# Patient Record
Sex: MALE | Race: WHITE | NOT HISPANIC OR LATINO | Employment: FULL TIME | URBAN - METROPOLITAN AREA
[De-identification: names, ages, dates, MRNs, and addresses within clinical notes are randomized per-mention and may not be internally consistent; named-entity substitution may affect disease eponyms.]

---

## 2017-02-03 ENCOUNTER — OFFICE VISIT (OUTPATIENT)
Dept: CARDIOLOGY | Facility: CLINIC | Age: 67
End: 2017-02-03
Payer: COMMERCIAL

## 2017-02-03 VITALS
WEIGHT: 233.69 LBS | DIASTOLIC BLOOD PRESSURE: 63 MMHG | HEIGHT: 72 IN | HEART RATE: 64 BPM | SYSTOLIC BLOOD PRESSURE: 131 MMHG | BODY MASS INDEX: 31.65 KG/M2

## 2017-02-03 DIAGNOSIS — I10 ESSENTIAL HYPERTENSION: ICD-10-CM

## 2017-02-03 DIAGNOSIS — E78.5 HYPERLIPIDEMIA, UNSPECIFIED HYPERLIPIDEMIA TYPE: ICD-10-CM

## 2017-02-03 DIAGNOSIS — E78.00 HYPERCHOLESTEREMIA: Primary | ICD-10-CM

## 2017-02-03 PROCEDURE — 99999 PR PBB SHADOW E&M-EST. PATIENT-LVL III: CPT | Mod: PBBFAC,,, | Performed by: INTERNAL MEDICINE

## 2017-02-03 PROCEDURE — 1157F ADVNC CARE PLAN IN RCRD: CPT | Mod: S$GLB,,, | Performed by: INTERNAL MEDICINE

## 2017-02-03 PROCEDURE — 1125F AMNT PAIN NOTED PAIN PRSNT: CPT | Mod: S$GLB,,, | Performed by: INTERNAL MEDICINE

## 2017-02-03 PROCEDURE — 3075F SYST BP GE 130 - 139MM HG: CPT | Mod: S$GLB,,, | Performed by: INTERNAL MEDICINE

## 2017-02-03 PROCEDURE — 3078F DIAST BP <80 MM HG: CPT | Mod: S$GLB,,, | Performed by: INTERNAL MEDICINE

## 2017-02-03 PROCEDURE — 99214 OFFICE O/P EST MOD 30 MIN: CPT | Mod: S$GLB,,, | Performed by: INTERNAL MEDICINE

## 2017-02-03 PROCEDURE — 1159F MED LIST DOCD IN RCRD: CPT | Mod: S$GLB,,, | Performed by: INTERNAL MEDICINE

## 2017-02-03 PROCEDURE — 1160F RVW MEDS BY RX/DR IN RCRD: CPT | Mod: S$GLB,,, | Performed by: INTERNAL MEDICINE

## 2017-02-03 RX ORDER — PRAVASTATIN SODIUM 20 MG/1
20 TABLET ORAL DAILY
Qty: 90 TABLET | Refills: 3 | Status: SHIPPED | OUTPATIENT
Start: 2017-02-03 | End: 2018-02-16

## 2017-02-03 RX ORDER — PRAVASTATIN SODIUM 20 MG/1
20 TABLET ORAL DAILY
Qty: 90 TABLET | Refills: 3 | Status: SHIPPED | OUTPATIENT
Start: 2017-02-03 | End: 2017-02-03 | Stop reason: SDUPTHER

## 2017-02-03 NOTE — LETTER
February 5, 2017      Taj De La Vega MD  [address] [facility] [address]           [facility]  [address]  [place] [address]  Phone: [phone]          Patient: Riky Salcedo   MR Number: [id number]   YOB: 1950   Date of Visit: 2/3/2017       Dear Dr. Taj De La Vega:    Thank you for referring Riky Salcedo to me for evaluation. Attached you will find relevant portions of my assessment and plan of care.    If you have questions, please do not hesitate to call me. I look forward to following Riky Salcedo along with you.    Sincerely,    Elodia Duval MD    Enclosure  CC:  No Recipients    If you would like to receive this communication electronically, please contact [email or web address] or [phone] to request more information on [employer] Link access.    For providers and/or their staff who would like to refer a patient to [facility], please contact us through our one-stop-shop provider referral line, [facility], at [phone].    If you feel you have received this communication in error or would no longer like to receive these types of communications, please e-mail [email or web address]

## 2017-02-03 NOTE — PROGRESS NOTES
Subjective:   Patient ID:  Riky Salcedo is a 66 y.o. male who presents for follow-up of Hyperlipidemia (1 yr f/u )    Patient is a . Patient was in an attic working in an odd position that his left arm and shoulder was twisted which led to pain in the left shoulder and upper left chest. Pain getting better does not want to take Ibobrufen but would like to continue tylenol.Stress test normal. [place] risk score 18% discussed the 10 year risk. Patient has been prescribed statin by Dr. Tompkins before that he does not want to take it with concerns of myalgias and seems to have a non-compliant attitude, he also Has a family h/o MI in the parents, he also does not want to take ASA for the concern of bleeding.      Echo 1/16    1 - Normal left ventricular systolic function (EF 60-65%).     2 - Normal left ventricular diastolic function.     3 - Normal right ventricular systolic function .     No evidence of stress induced myocardial ischemia.   HPI:   No complains  No chest pain, Orthopnea, PND of heart failure symptoms.        Patient Active Problem List   Diagnosis    Hypertension    HSV-1 (herpes simplex virus 1) infection    Male hypogonadism    Lipoma of back    Alcohol abuse with alcohol-induced disorder    Infected sebaceous cyst    Hypercholesteremia    Special screening for malignant neoplasms, colon     Visit Vitals    /63 (BP Location: Left arm, Patient Position: Sitting, BP Method: Automatic)    Pulse 64    Ht 6' (1.829 m)    Wt 106 kg (233 lb 11 oz)    BMI 31.69 kg/m2     Body mass index is 31.69 kg/(m^2).  CrCl cannot be calculated (Patient has no serum creatinine result on file.).    Lab Results   Component Value Date     01/12/2016    K 4.5 01/12/2016     01/12/2016    CO2 27 01/12/2016    BUN 16 01/12/2016    CREATININE 1.0 01/12/2016     01/12/2016    HGBA1C 6.0 03/24/2015    AST 23 01/12/2016    ALT 28 01/12/2016    ALBUMIN 3.7 03/16/2015    PROT  7.2 03/16/2015    BILITOT 0.5 03/16/2015    WBC 6.75 03/16/2015    HGB 14.7 03/16/2015    HCT 43.3 03/16/2015    MCV 89 03/16/2015     03/16/2015    INR 0.9 07/02/2009    PSA 0.41 02/28/2013    TSH 2.005 05/21/2012    CHOL 205 (H) 01/12/2016    HDL 42 01/12/2016    LDLCALC 133.6 01/12/2016    TRIG 147 01/12/2016       Current Outpatient Prescriptions   Medication Sig    desonide 0.05% (DESOWEN) 0.05 % Oint AAA on scrotum BID x 1-2 wks then prn flares only    lisinopril (PRINIVIL,ZESTRIL) 20 MG tablet TAKE ONE TABLET BY MOUTH ONCE DAILY    pravastatin (PRAVACHOL) 20 MG tablet Take 1 tablet (20 mg total) by mouth once daily.     No current facility-administered medications for this visit.        Review of Systems   Constitution: Positive for weakness and malaise/fatigue. Negative for chills, decreased appetite, night sweats, weight gain and weight loss.        Patient drinks and plays black elena regularly couple of times a week.   Patient works long hours and does not sleep well   Eyes: Negative for blurred vision, double vision, visual disturbance and visual halos.   Cardiovascular: Negative for chest pain, claudication, cyanosis, dyspnea on exertion, irregular heartbeat, leg swelling, near-syncope, orthopnea, palpitations, paroxysmal nocturnal dyspnea and syncope.   Respiratory: Negative for cough, hemoptysis, snoring, sputum production and wheezing.    Endocrine: Negative for cold intolerance, heat intolerance, polydipsia and polyphagia.   Hematologic/Lymphatic: Negative for adenopathy and bleeding problem. Does not bruise/bleed easily.   Skin: Negative for flushing, itching, poor wound healing and rash.   Musculoskeletal: Positive for arthritis (knee injection received). Negative for back pain, falls, gout, joint pain, joint swelling, muscle cramps, muscle weakness, myalgias, neck pain and stiffness.   Gastrointestinal: Negative for bloating, abdominal pain, anorexia, diarrhea, dysphagia, excessive  appetite, flatus, hematemesis, jaundice, melena and nausea.   Genitourinary: Negative for hesitancy and incomplete emptying.   Neurological: Negative for aphonia, brief paralysis, difficulty with concentration, disturbances in coordination, excessive daytime sleepiness, dizziness, focal weakness, light-headedness and loss of balance.        Insomnia   Psychiatric/Behavioral: Negative for altered mental status, depression, hallucinations, hypervigilance, memory loss, substance abuse and suicidal ideas. The patient does not have insomnia and is not nervous/anxious.        Objective:   Physical Exam   Constitutional: He is oriented to person, place, and time. He appears well-developed and well-nourished. No distress.   Prominent facial redness   HENT:   Head: Normocephalic and atraumatic.   Nose: Nose normal.   Mouth/Throat: No oropharyngeal exudate.   Eyes: Conjunctivae and EOM are normal. Pupils are equal, round, and reactive to light. Right eye exhibits no discharge. Left eye exhibits no discharge. No scleral icterus.   Neck: Normal range of motion. Neck supple. No JVD present. No tracheal deviation present. No thyromegaly present.   Cardiovascular: Normal rate, regular rhythm, normal heart sounds and intact distal pulses.  Exam reveals no gallop and no friction rub.    No murmur heard.  Pulmonary/Chest: Effort normal and breath sounds normal. No stridor. No respiratory distress. He has no wheezes. He has no rales. He exhibits no tenderness.   Abdominal: Soft. Bowel sounds are normal. He exhibits no distension and no mass. There is no tenderness.   Musculoskeletal: He exhibits no edema or tenderness.   Lymphadenopathy:     He has no cervical adenopathy.   Neurological: He is alert and oriented to person, place, and time. He has normal reflexes. No cranial nerve deficit. He exhibits normal muscle tone. Coordination normal.   Skin: Skin is warm and dry. No rash noted. He is not diaphoretic. No erythema. No pallor.    Psychiatric: He has a normal mood and affect. His behavior is normal. Judgment and thought content normal.       Assessment:     1. Hypercholesteremia    2. Hyperlipidemia, unspecified hyperlipidemia type    3. Essential hypertension        Plan:   Riky was seen today for hyperlipidemia.    Diagnoses and all orders for this visit:    Hypercholesteremia  -     Discontinue: pravastatin (PRAVACHOL) 20 MG tablet; Take 1 tablet (20 mg total) by mouth once daily.  -     pravastatin (PRAVACHOL) 20 MG tablet; Take 1 tablet (20 mg total) by mouth once daily.    Hyperlipidemia, unspecified hyperlipidemia type    Essential hypertension      We rediscussed his ASCVD risk as well as the need for statins, he is willing to give a trial  Counseled on importance of heart healthy diet low in saturated and trans fat and salt as well gradually starting a regular aerobic exercise regimen with goal of 30min 5x/week. Recommend BP diary. Call if systolic BP > 140 mmHg on checking repeatedly  Patient is compliant with his medications.  All meds reviewed and are appropriate      rtc 1 yr

## 2017-02-03 NOTE — MR AVS SNAPSHOT
[facility]  [address]  [facility] [address]  Phone: [phone]                  Riky Salcedo   2/3/2017 4:30 PM   Office Visit    Description:  Male : 1950   Provider:  Elodia Dvual MD   Department:  [facility]           Reason for Visit     Hyperlipidemia           Diagnoses this Visit        Comments    Hypercholesteremia    -  Primary     Hyperlipidemia, unspecified hyperlipidemia type         Essential hypertension                To Do List           Future Appointments        Provider Department Dept Phone    2/3/2017 4:30 PM MD [facility] [phone]      Goals (5 Years of Data)     None      Follow-Up and Disposition     Return in about 1 year (around 2/3/2018).       These Medications        Disp Refills Start End    pravastatin (PRAVACHOL) 20 MG tablet 90 tablet 3 2/3/2017 2/3/2018    Take 1 tablet (20 mg total) by mouth once daily. - Oral    Pharmacy: [facility] [address] [place] [address] #: [phone]         [facility]     [facility] On Call Nurse Care Line -  Assistance  Registered nurses in the [facility] On Call Center provide clinical advisement, health education, appointment booking, and other advisory services.  Call for this free service at [phone].             Medications           Message regarding Medications     Verify the changes and/or additions to your medication regime listed below are the same as discussed with your clinician today.  If any of these changes or additions are incorrect, please notify your healthcare provider.        START taking these NEW medications        Refills    pravastatin (PRAVACHOL) 20 MG tablet 3    Sig: Take 1 tablet (20 mg total) by mouth once daily.    Class: Normal    Route: Oral           Verify that the below list of medications is an accurate representation of the medications you are currently taking.  If none reported, the  list may be blank. If incorrect, please contact your healthcare provider. Carry this list with you in case of emergency.           Current Medications     desonide 0.05% (DESOWEN) 0.05 % Oint AAA on scrotum BID x 1-2 wks then prn flares only    lisinopril (PRINIVIL,ZESTRIL) 20 MG tablet TAKE ONE TABLET BY MOUTH ONCE DAILY    pravastatin (PRAVACHOL) 20 MG tablet Take 1 tablet (20 mg total) by mouth once daily.           Clinical Reference Information           Your Vitals Were     BP Pulse Height Weight BMI    131/63 (BP Location: Left arm, Patient Position: Sitting, BP Method: Automatic) 64 6' (1.829 m) 106 kg (233 lb 11 oz) 31.69 kg/m2      Blood Pressure          Most Recent Value    Right Arm BP - Sitting  126/55    Left Arm BP - Sitting  131/63    BP  131/63      Allergies as of 2/3/2017     No Known Allergies      Immunizations Administered on Date of Encounter - 2/3/2017     None      Language Assistance Services     ATTENTION: Language assistance services are available, free of charge. Please call [phone].      ATENCIÓN: Si habla español, tiene a armas disposición servicios gratuitos de asistencia lingüística. Llame al [phone].     KILEY Ý: N?u b?n nói Ti?ng Vi?t, có các d?ch v? h? tr? ngôn ng? mi?n phí dành cho b?n. G?i s? [phone].         [facility] complies with applicable Federal civil rights laws and does not discriminate on the basis of race, color, national origin, age, disability, or sex.

## 2017-02-09 ENCOUNTER — TELEPHONE (OUTPATIENT)
Dept: CARDIOLOGY | Facility: CLINIC | Age: 67
End: 2017-02-09

## 2017-02-13 ENCOUNTER — TELEPHONE (OUTPATIENT)
Dept: CARDIOLOGY | Facility: CLINIC | Age: 67
End: 2017-02-13

## 2017-02-13 NOTE — TELEPHONE ENCOUNTER
",           LOV:2/3/17.Pt said that he thought that you had discussed changing the pravastatin to another cholesterol lowering medication that started with an "M"he was not sure of the name but he said the Pharmacy said a refill for the pravastatin was sent.Please advise.Thanks,Yessy  "

## 2017-02-13 NOTE — TELEPHONE ENCOUNTER
----- Message from Albertina Graf sent at 2/13/2017  9:32 AM CST -----  Contact: pt  Pt returning call from Keila in ref to his medicines. Refill needed on his Lisinpril 20 mg   And she was suppose to change his Pravachol 20 mg   LOV 2/3/17 Dr. Duval     Thanks

## 2017-02-13 NOTE — TELEPHONE ENCOUNTER
Left voice mail instructions with 's orders and asked him to call back if he had any further questions or concerns.

## 2017-03-29 ENCOUNTER — OFFICE VISIT (OUTPATIENT)
Dept: UROLOGY | Facility: CLINIC | Age: 67
End: 2017-03-29
Payer: COMMERCIAL

## 2017-03-29 ENCOUNTER — LAB VISIT (OUTPATIENT)
Dept: LAB | Facility: HOSPITAL | Age: 67
End: 2017-03-29
Attending: UROLOGY
Payer: COMMERCIAL

## 2017-03-29 VITALS
WEIGHT: 233 LBS | TEMPERATURE: 98 F | DIASTOLIC BLOOD PRESSURE: 77 MMHG | BODY MASS INDEX: 31.56 KG/M2 | SYSTOLIC BLOOD PRESSURE: 128 MMHG | HEIGHT: 72 IN | HEART RATE: 80 BPM

## 2017-03-29 DIAGNOSIS — N28.1 BILATERAL RENAL CYSTS: ICD-10-CM

## 2017-03-29 DIAGNOSIS — N43.40 SPERMATOCELE: ICD-10-CM

## 2017-03-29 DIAGNOSIS — N50.3 EPIDIDYMAL CYST: ICD-10-CM

## 2017-03-29 DIAGNOSIS — Z12.5 PROSTATE CANCER SCREENING: ICD-10-CM

## 2017-03-29 DIAGNOSIS — N40.1 BPH NOS W UR OBS/LUTS: Primary | ICD-10-CM

## 2017-03-29 DIAGNOSIS — N13.30 HYDRONEPHROSIS, UNSPECIFIED HYDRONEPHROSIS TYPE: ICD-10-CM

## 2017-03-29 LAB — COMPLEXED PSA SERPL-MCNC: 0.7 NG/ML

## 2017-03-29 PROCEDURE — 99999 PR PBB SHADOW E&M-EST. PATIENT-LVL IV: CPT | Mod: PBBFAC,,, | Performed by: UROLOGY

## 2017-03-29 PROCEDURE — 1159F MED LIST DOCD IN RCRD: CPT | Mod: S$GLB,,, | Performed by: UROLOGY

## 2017-03-29 PROCEDURE — 87077 CULTURE AEROBIC IDENTIFY: CPT

## 2017-03-29 PROCEDURE — 87186 SC STD MICRODIL/AGAR DIL: CPT

## 2017-03-29 PROCEDURE — 99215 OFFICE O/P EST HI 40 MIN: CPT | Mod: S$GLB,,, | Performed by: UROLOGY

## 2017-03-29 PROCEDURE — 84153 ASSAY OF PSA TOTAL: CPT

## 2017-03-29 PROCEDURE — 87086 URINE CULTURE/COLONY COUNT: CPT

## 2017-03-29 PROCEDURE — 1157F ADVNC CARE PLAN IN RCRD: CPT | Mod: S$GLB,,, | Performed by: UROLOGY

## 2017-03-29 PROCEDURE — 1160F RVW MEDS BY RX/DR IN RCRD: CPT | Mod: S$GLB,,, | Performed by: UROLOGY

## 2017-03-29 PROCEDURE — 1126F AMNT PAIN NOTED NONE PRSNT: CPT | Mod: S$GLB,,, | Performed by: UROLOGY

## 2017-03-29 PROCEDURE — 36415 COLL VENOUS BLD VENIPUNCTURE: CPT

## 2017-03-29 PROCEDURE — 3074F SYST BP LT 130 MM HG: CPT | Mod: S$GLB,,, | Performed by: UROLOGY

## 2017-03-29 PROCEDURE — 87088 URINE BACTERIA CULTURE: CPT

## 2017-03-29 PROCEDURE — 3078F DIAST BP <80 MM HG: CPT | Mod: S$GLB,,, | Performed by: UROLOGY

## 2017-03-29 NOTE — MR AVS SNAPSHOT
[facility]  [address] [place] [address] Ave  [place] [address]  Phone: [phone]                  Riky Salcedo   3/29/2017 10:45 AM   Office Visit    Description:  Male : 1950   Provider:  Adithya Dillon MD   Department:  [facility]           Diagnoses this Visit        Comments    BPH NOS w ur obs/LUTS    -  Primary     Bilateral renal cysts         Epididymal cyst         Spermatocele         Hydronephrosis, unspecified hydronephrosis type         Prostate cancer screening                To Do List           Future Appointments        Provider Department Dept Phone    4/10/2017 2:45 PM [facility] [phone]      Goals (5 Years of Data)     None      [facility] On [facility]     [facility] On Call Nurse Care Line -  Assistance  Registered nurses in the [facility] On Call Center provide clinical advisement, health education, appointment booking, and other advisory services.  Call for this free service at [phone].             Medications           Message regarding Medications     Verify the changes and/or additions to your medication regime listed below are the same as discussed with your clinician today.  If any of these changes or additions are incorrect, please notify your healthcare provider.             Verify that the below list of medications is an accurate representation of the medications you are currently taking.  If none reported, the list may be blank. If incorrect, please contact your healthcare provider. Carry this list with you in case of emergency.           Current Medications     desonide 0.05% (DESOWEN) 0.05 % Oint AAA on scrotum BID x 1-2 wks then prn flares only    lisinopril (PRINIVIL,ZESTRIL) 20 MG tablet TAKE ONE TABLET BY MOUTH ONCE DAILY    pravastatin (PRAVACHOL) 20 MG tablet Take 1 tablet (20 mg total) by mouth once daily.           Clinical Reference Information           Your Vitals Were     BP Pulse Temp Height Weight BMI     128/77 80 97.9 °F (36.6 °C) 6' (1.829 m) 105.7 kg (233 lb) 31.6 kg/m2      Blood Pressure          Most Recent Value    BP  128/77      Allergies as of 3/29/2017     No Known Allergies      Immunizations Administered on Date of Encounter - 3/29/2017     None      Orders Placed During Today's Visit      Normal Orders This Visit    Bladder scan     Urine culture     Future Labs/Procedures Expected by Expires    PSA, Screening  3/29/2017 5/28/2018    US Retroperitoneal Complete (Kidney and  3/29/2017 3/29/2018      Language Assistance Services     ATTENTION: Language assistance services are available, free of charge. Please call [phone].      ATENCIÓN: Si kaelynla sobeida, tiene a armas disposición servicios gratuitos de asistencia lingüística. Llame al [phone].     CHÚ Ý: N?u b?n nói Ti?ng Vi?t, có các d?ch v? h? tr? ngôn ng? mi?n phí dành cho b?n. G?i s? [phone].         [facility] complies with applicable Federal civil rights laws and does not discriminate on the basis of race, color, national origin, age, disability, or sex.

## 2017-03-29 NOTE — PROGRESS NOTES
"HPI:  Riky Salcedo is a 66 y.o. year old male that  presents with No chief complaint on file.  .  This patient refers himself to the office with urinary frequency.  Patient is new to me.  He saw Dr. Linda, urologist in May 2014.  At that time patient not want therapy for BPH.  At that time.  Patient had mild Peyronie"s disease which the patient states has since resolved.  Patient also had erectile dysfunction at that time which also has since resolved.  Note was made of low testosterone however serum testosterone in October 2011 was normal    Patient now states that he has frequency for 8 months.  He also has terminal dribbling with a plus minus okay strength of stream with nocturia ×1.  He has no dysuria or gross hematuria bladder scan postvoid residual the office today is 0    Patient is never had urologic surgery and there is no family history of prostate cancer and the patient has never had kidney stones    Chart review shows no from cardiology from February of this year showing high cholesterol and high blood pressure.  The note from his PCP from November of last year shows knee pain.  January of last year GFR was greater than 60 and in May 2014 serum PSA was 0.6.  In October 2016 urine culture was negative.  In October 2016 scrotal ultrasound showed normal testicles possible left spermatocele and possible right epididymal cysts.  This is reviewed by me and I agree with these findings.  In May 2012 and on ultrasound showed bilateral renal cysts with possible mild hydronephrosis      Past Medical History:   Diagnosis Date    Fever blister     HSV-1 (herpes simplex virus 1) infection     Hyperlipidemia     Hypertension     Keloid cicatrix     Low serum testosterone level     Obese     Peyronie's disease      Social History     Social History    Marital status:      Spouse name: N/A    Number of children: N/A    Years of education: N/A     Occupational History    Retired [employer]     Social History Main Topics    Smoking status: Former Smoker     Types: Cigars    Smokeless tobacco: Former User    Alcohol use 2.5 oz/week     5 Standard drinks or equivalent per week      Comment: 4-5 beers a day    Drug use: No    Sexual activity: Yes     Partners: Female     Other Topics Concern    Not on file     Social History Narrative     Past Surgical History:   Procedure Laterality Date    COLONOSCOPY N/A 8/5/2016    Procedure: COLONOSCOPY;  Surgeon: Denis Herzog MD;  Location: [facility] ([address] [place] [address];  Service: Endoscopy;  Laterality: N/A;  has hemorrhoid that may need to be banded    HERNIA REPAIR      UMBILICAL HERNIA REPAIR       Family History   Problem Relation Age of Onset    Heart disease Mother     Heart disease Father     Hypertension Father     Heart disease Paternal Grandmother     Hypertension Paternal Grandmother     Colon cancer Neg Hx     Crohn's disease Neg Hx     Ulcerative colitis Neg Hx     Melanoma Neg Hx     Prostate cancer Neg Hx     Kidney disease Neg Hx            Review of Systems  The patient has no chest pains.  The patient has no shortness of breath  Patient wears glasses.  All other review of systems are negative.      Physical Exam:  /77  Pulse 80  Temp 97.9 °F (36.6 °C)  Ht 6' (1.829 m)  Wt 105.7 kg (233 lb)  BMI 31.6 kg/m2  General appearance: alert, cooperative, no distress  Constitutional:Oriented to person, place, and time.appears well-developed and well-nourished.   HEENT: Normocephalic, atraumatic, neck symmetrical, no nasal discharge   Eyes: conjunctivae/corneas clear, PERRL, EOM's intact  Lungs: clear to auscultation bilaterally, no dullness to percussion bilaterally  Heart: regular rate and rhythm without rub; no displacement of the PMI   Abdomen: soft, non-tender; bowel sounds normoactive; no organomegaly  :Penis/perineum without lesions, scrotum without rash/cysts, epididymis nontender bilaterally, urethral meatus in  normal location normal size, no penile plaques palpated, prostate smoothly enlarged, no nodules, seminal vesicles not palpated.  No rectal masses, sphincter tone normal.  Testes equal in size without masses  Extremities: extremities symmetric; no clubbing, cyanosis, or edema  Integument: Skin color, texture, turgor normal; no rashes; hair distrubution normal  Neurologic: Alert and oriented X 3, normal strength, normal coordination and gait  Psychiatric: no pressured speech; normal affect; no evidence of impaired cognition     LABS:    Complete Blood Count  Lab Results   Component Value Date    RBC 4.85 03/16/2015    HGB 14.7 03/16/2015    HCT 43.3 03/16/2015    MCV 89 03/16/2015    MCH 30.3 03/16/2015    MCHC 33.9 03/16/2015    RDW 13.7 03/16/2015     03/16/2015    MPV 11.6 03/16/2015    GRAN 4.0 03/16/2015    GRAN 58.6 03/16/2015    LYMPH 2.1 03/16/2015    LYMPH 31.0 03/16/2015    MONO 0.5 03/16/2015    MONO 7.0 03/16/2015    EOS 0.2 03/16/2015    BASO 0.04 03/16/2015    EOSINOPHIL 2.5 03/16/2015    BASOPHIL 0.6 03/16/2015    DIFFMETHOD Automated 03/16/2015       Comprehensive Metabolic Panel  Lab Results   Component Value Date     01/12/2016    BUN 16 01/12/2016    CREATININE 1.0 01/12/2016     01/12/2016    K 4.5 01/12/2016     01/12/2016    PROT 7.2 03/16/2015    ALBUMIN 3.7 03/16/2015    BILITOT 0.5 03/16/2015    AST 23 01/12/2016    ALKPHOS 76 03/16/2015    CO2 27 01/12/2016    ALT 28 01/12/2016    ANIONGAP 7 (L) 01/12/2016    EGFRNONAA >60.0 01/12/2016    ESTGFRAFRICA >60.0 01/12/2016       PSA  Lab Results   Component Value Date    PSA 0.70 03/29/2017         Assessment:    ICD-10-CM ICD-9-CM    1. BPH NOS w ur obs/LUTS N40.1 600.91 Urine culture      Bladder scan   2. Bilateral renal cysts N28.1 753.10    3. Epididymal cyst N50.3 608.89    4. Spermatocele N43.40 608.1    5. Hydronephrosis, unspecified hydronephrosis type N13.30 591 US Retroperitoneal Complete (Kidney and   6.  Prostate cancer screening Z12.5 V76.44 PSA, Screening     The primary encounter diagnosis was BPH NOS w ur obs/LUTS. Diagnoses of Bilateral renal cysts, Epididymal cyst, Spermatocele, Hydronephrosis, unspecified hydronephrosis type, and Prostate cancer screening were also pertinent to this visit.          Plan: #1 BPH.  Plan.  I discussed medical therapy for voiding symptoms but at this point he does not want medical therapy for this    Per to.  Dragon renal cystsRenal cyst.  I discussed the benign nature of renal cysts and that no intervention and no further evaluation is needed.    #3Epididymal cyst.  The benign nature of epididymal cysts discussed at length in detail with the patient and reviewed I discussed that no further follow-up or evaluation needed for this entity.  I discussed that these were present on ultrasound but not present on physical exam and therefore not clinically significant    #4 spermatocele.  Plan.  Discussed that these were present on ultrasound but not present on physical exam and therefore not clinically significant    #5 hydronephrosis on ultrasound from 2012 with subsequent normal GFR.  Plan.  I discussed that this is most likely not clinically significant.  I think it reasonable to recheck ultrasound.  Patient agreeable.  We will contact the patient with any significant finding on this repeat ultrasound    #6 prostate cancer screening.  Plan.  Discussed risk and benefits and patient wants to proceed.  We will contact the patient with any significant finding    At this point follow-up is on an as-needed basis    Today I spent more than 60 minutes with the patient with an half of which was spent in discussion of issues as listed above  Orders Placed This Encounter   Procedures    Urine culture    US Retroperitoneal Complete (Kidney and    PSA, Screening    Bladder scan           Adithya Dillon MD

## 2017-04-01 LAB — BACTERIA UR CULT: NORMAL

## 2017-04-05 ENCOUNTER — TELEPHONE (OUTPATIENT)
Dept: UROLOGY | Facility: CLINIC | Age: 67
End: 2017-04-05

## 2017-04-05 RX ORDER — SULFAMETHOXAZOLE AND TRIMETHOPRIM 800; 160 MG/1; MG/1
1 TABLET ORAL 2 TIMES DAILY
Qty: 14 TABLET | Refills: 0 | Status: SHIPPED | OUTPATIENT
Start: 2017-04-05 | End: 2017-04-12 | Stop reason: SINTOL

## 2017-04-05 NOTE — TELEPHONE ENCOUNTER
----- Message from Tanya Youssef sent at 4/5/2017  8:58 AM CDT -----  No. [phone]     Patient returned your call.

## 2017-04-05 NOTE — TELEPHONE ENCOUNTER
Please inform patient that he has a urinary tract infection and that I have written a prescription for antibiotics.  Keep scheduled follow-up appointment

## 2017-04-07 ENCOUNTER — TELEPHONE (OUTPATIENT)
Dept: UROLOGY | Facility: CLINIC | Age: 67
End: 2017-04-07

## 2017-04-07 ENCOUNTER — TELEPHONE (OUTPATIENT)
Dept: CARDIOLOGY | Facility: CLINIC | Age: 67
End: 2017-04-07

## 2017-04-07 DIAGNOSIS — I10 ESSENTIAL HYPERTENSION: Primary | ICD-10-CM

## 2017-04-07 NOTE — TELEPHONE ENCOUNTER
----- Message from Uzma Ross sent at 4/7/2017  2:12 PM CDT -----  Contact: self, [phone] or [phone]  Patient states he was prescribed Bactrim and requests to speak with you about taking it with his blood pressure medication Lisinopril. States serious side effects are all over the internet. Please advise.

## 2017-04-07 NOTE — TELEPHONE ENCOUNTER
Pt calling, states he was recently prescribed Bactrim DS for UTI. Pt states he is taking Lisinopril. Pt asking about interaction between these medications.  notified, ordered pt drink extra fluids while taking Bactrim, have BMP drawn soon. Pt notified, wants to have lab drawn 4/10. Appointment scheduled with pt.

## 2017-04-10 ENCOUNTER — HOSPITAL ENCOUNTER (OUTPATIENT)
Dept: RADIOLOGY | Facility: HOSPITAL | Age: 67
Discharge: HOME | End: 2017-04-10
Attending: UROLOGY
Payer: COMMERCIAL

## 2017-04-10 DIAGNOSIS — N13.30 HYDRONEPHROSIS, UNSPECIFIED HYDRONEPHROSIS TYPE: ICD-10-CM

## 2017-04-10 PROCEDURE — 76770 US EXAM ABDO BACK WALL COMP: CPT | Mod: 26,,, | Performed by: RADIOLOGY

## 2017-04-10 PROCEDURE — 76770 US EXAM ABDO BACK WALL COMP: CPT | Mod: TC

## 2017-04-12 ENCOUNTER — TELEPHONE (OUTPATIENT)
Dept: UROLOGY | Facility: CLINIC | Age: 67
End: 2017-04-12

## 2017-04-12 RX ORDER — CEPHALEXIN 500 MG/1
500 CAPSULE ORAL EVERY 12 HOURS
Qty: 20 CAPSULE | Refills: 0 | Status: SHIPPED | OUTPATIENT
Start: 2017-04-12 | End: 2017-04-22

## 2017-04-24 ENCOUNTER — TELEPHONE (OUTPATIENT)
Dept: UROLOGY | Facility: CLINIC | Age: 67
End: 2017-04-24

## 2017-04-24 NOTE — TELEPHONE ENCOUNTER
----- Message from Gale Huntley sent at 4/24/2017  9:04 AM CDT -----  Contact: self/[phone]  Patient would like to speak with you about the antibiotics he was taking and he wants to know if he needs to take probiotics to get his stomach back in shape.  Please advise

## 2017-06-22 ENCOUNTER — OFFICE VISIT (OUTPATIENT)
Dept: OPTOMETRY | Facility: CLINIC | Age: 67
End: 2017-06-22
Payer: COMMERCIAL

## 2017-06-22 DIAGNOSIS — H25.13 NUCLEAR SCLEROSIS, BILATERAL: ICD-10-CM

## 2017-06-22 DIAGNOSIS — H10.829 ROSACEA CONJUNCTIVITIS: Primary | ICD-10-CM

## 2017-06-22 DIAGNOSIS — L71.8 ROSACEA CONJUNCTIVITIS: Primary | ICD-10-CM

## 2017-06-22 DIAGNOSIS — H04.123 DRY EYES, BILATERAL: ICD-10-CM

## 2017-06-22 PROCEDURE — 99999 PR PBB SHADOW E&M-EST. PATIENT-LVL II: CPT | Mod: PBBFAC,,, | Performed by: OPTOMETRIST

## 2017-06-22 PROCEDURE — 92004 COMPRE OPH EXAM NEW PT 1/>: CPT | Mod: S$GLB,,, | Performed by: OPTOMETRIST

## 2017-06-22 NOTE — PROGRESS NOTES
HPI     JOHANNA: 6 months ago   Pt states 1 month ago he walked into a spider web. States he feels   irritation in the left eye.   +Burning (rikki. In the morning)  States hurts when blinking  States the FBS is like a vertical line.   Denies pain, states more discomfort. States today he doesn't have the FBS   in the left eye.   Pt states he uses Visine once in the left eye, but the drop burned and has   not put anything else in the eyes   Denies discharge     Last edited by Shilpi Keita on 6/22/2017 10:00 AM. (History)            Assessment /Plan     For exam results, see Encounter Report.    Rosacea conjunctivitis    Dry eyes, bilateral    Nuclear sclerosis, bilateral      1. Small Cats OU--did not refract today 2 to eye problem  2. Pt complaint today 2 to dry eye sx OS, most likely related to rosacea    PLAN:    1. SYS BAL or SOOTHE XP ATs QID OU  2. OCUSOFT lid scrubs daily.  Discussed chronic nature of condition  3. Pt saw derm yesterday and passed on METROGEL for skin.  Discussed w pt if gets if for his face he can apply to eyelids too as off label use  4. Pt will call if sx persist w tx.  Otherwise rtc 1 yr (or sooner if wishes refraction for spex)

## 2017-08-17 RX ORDER — LISINOPRIL 20 MG/1
TABLET ORAL
Qty: 90 TABLET | Refills: 3 | Status: SHIPPED | OUTPATIENT
Start: 2017-08-17 | End: 2018-02-16 | Stop reason: SDUPTHER

## 2017-12-20 DIAGNOSIS — I10 ESSENTIAL HYPERTENSION: ICD-10-CM

## 2017-12-20 DIAGNOSIS — E78.00 HYPERCHOLESTEROLEMIA: Primary | ICD-10-CM

## 2017-12-23 ENCOUNTER — LAB VISIT (OUTPATIENT)
Dept: LAB | Facility: HOSPITAL | Age: 67
End: 2017-12-23
Attending: INTERNAL MEDICINE
Payer: COMMERCIAL

## 2017-12-23 DIAGNOSIS — I10 ESSENTIAL HYPERTENSION: ICD-10-CM

## 2017-12-23 DIAGNOSIS — E78.00 HYPERCHOLESTEROLEMIA: ICD-10-CM

## 2017-12-23 LAB
ALT SERPL W/O P-5'-P-CCNC: 22 U/L
ANION GAP SERPL CALC-SCNC: 7 MMOL/L
AST SERPL-CCNC: 17 U/L
BUN SERPL-MCNC: 18 MG/DL
CALCIUM SERPL-MCNC: 9.3 MG/DL
CHLORIDE SERPL-SCNC: 105 MMOL/L
CHOLEST SERPL-MCNC: 220 MG/DL
CHOLEST/HDLC SERPL: 5 {RATIO}
CO2 SERPL-SCNC: 28 MMOL/L
CREAT SERPL-MCNC: 0.9 MG/DL
EST. GFR  (AFRICAN AMERICAN): >60 ML/MIN/1.73 M^2
EST. GFR  (NON AFRICAN AMERICAN): >60 ML/MIN/1.73 M^2
GLUCOSE SERPL-MCNC: 99 MG/DL
HDLC SERPL-MCNC: 44 MG/DL
HDLC SERPL: 20 %
LDLC SERPL CALC-MCNC: 139.6 MG/DL
NONHDLC SERPL-MCNC: 176 MG/DL
POTASSIUM SERPL-SCNC: 4.5 MMOL/L
SODIUM SERPL-SCNC: 140 MMOL/L
TRIGL SERPL-MCNC: 182 MG/DL

## 2017-12-23 PROCEDURE — 84450 TRANSFERASE (AST) (SGOT): CPT

## 2017-12-23 PROCEDURE — 80048 BASIC METABOLIC PNL TOTAL CA: CPT

## 2017-12-23 PROCEDURE — 36415 COLL VENOUS BLD VENIPUNCTURE: CPT | Mod: PO

## 2017-12-23 PROCEDURE — 84460 ALANINE AMINO (ALT) (SGPT): CPT

## 2017-12-23 PROCEDURE — 80061 LIPID PANEL: CPT

## 2018-01-04 ENCOUNTER — OFFICE VISIT (OUTPATIENT)
Dept: OTOLARYNGOLOGY | Facility: CLINIC | Age: 68
End: 2018-01-04
Payer: COMMERCIAL

## 2018-01-04 VITALS
SYSTOLIC BLOOD PRESSURE: 121 MMHG | DIASTOLIC BLOOD PRESSURE: 76 MMHG | BODY MASS INDEX: 32.11 KG/M2 | HEART RATE: 119 BPM | TEMPERATURE: 97 F | WEIGHT: 236.75 LBS

## 2018-01-04 DIAGNOSIS — B00.1 COLD SORE: Primary | ICD-10-CM

## 2018-01-04 PROCEDURE — 99999 PR PBB SHADOW E&M-EST. PATIENT-LVL III: CPT | Mod: PBBFAC,,, | Performed by: OTOLARYNGOLOGY

## 2018-01-04 PROCEDURE — 99203 OFFICE O/P NEW LOW 30 MIN: CPT | Mod: S$GLB,,, | Performed by: OTOLARYNGOLOGY

## 2018-01-04 NOTE — PROGRESS NOTES
Chief Complaint   Patient presents with    consult/salivary stone,and sore on lip       HPI   67 y.o. male presents for evaluation of his lower lip.  He reports occasional cold sores and is concerned that one of the salivary glands within the lower lip is enlarged and infected.  No pain today. No drainage.     Review of Systems   Constitutional: Negative for fatigue and unexpected weight change.   HENT: Per HPI.  Eyes: Negative for visual disturbance.   Respiratory: Negative for shortness of breath, hemoptysis   Cardiovascular: Negative for chest pain and palpitations.   Musculoskeletal: Negative for decreased ROM, back pain.   Skin: Negative for rash, sunburn, itching.   Neurological: Negative for dizziness and seizures.   Hematological: Negative for adenopathy. Does not bruise/bleed easily.   Endocrine: Negative for rapid weight loss/weight gain, heat/cold intolerance.     Past Medical History   Patient Active Problem List   Diagnosis    Hypertension    HSV-1 (herpes simplex virus 1) infection    Male hypogonadism    Lipoma of back    Alcohol abuse with alcohol-induced disorder    Infected sebaceous cyst    Hypercholesteremia    Special screening for malignant neoplasms, colon           Past Surgical History   Past Surgical History:   Procedure Laterality Date    COLONOSCOPY N/A 8/5/2016    Procedure: COLONOSCOPY;  Surgeon: Denis Herzog MD;  Location: [facility] ([address] [place] [address]);  Service: Endoscopy;  Laterality: N/A;  has hemorrhoid that may need to be banded    HERNIA REPAIR      UMBILICAL HERNIA REPAIR           Family History   Family History   Problem Relation Age of Onset    Heart disease Mother     Heart disease Father     Hypertension Father     Heart disease Paternal Grandmother     Hypertension Paternal Grandmother     Colon cancer Neg Hx     Crohn's disease Neg Hx     Ulcerative colitis Neg Hx     Melanoma Neg Hx     Prostate cancer Neg Hx     Kidney disease Neg Hx            Social  History   .  Social History     Social History    Marital status:      Spouse name: N/A    Number of children: N/A    Years of education: N/A     Occupational History    Retired [employer]     Social History Main Topics    Smoking status: Former Smoker     Types: Cigars    Smokeless tobacco: Former User    Alcohol use 2.5 oz/week     5 Standard drinks or equivalent per week      Comment: 4-5 beers a day    Drug use: No    Sexual activity: Yes     Partners: Female     Other Topics Concern    Not on file     Social History Narrative    No narrative on file         Allergies   Review of patient's allergies indicates:  No Known Allergies        Physical Exam     Vitals:    01/04/18 1458   BP: 121/76   Pulse: (!) 119   Temp: 97.4 °F (36.3 °C)         Body mass index is 32.11 kg/m².      General: AOx3, NAD   Respiratory:  Symmetric chest rise, normal effort  Right Ear: External Auditory Canal WNL,TM w/o masses/lesions/perforations.  Middle ear without evidence of effusion.   Left Ear: External Auditory Canal WNL,TM w/o masses/lesions/perforations.  Middle ear without evidence of effusion.   Nose: No gross nasal septal deviation. Inferior Turbinates WNL bilaterally. No septal perforation. No masses/lesions.   Oral Cavity:   Palpable salivary glands with the submucosa of the lower lip.  R side slightly more prominent than L.   Oral Tongue mobile, no lesions noted. Hard Palate WNL. No buccal or FOM lesions.  Oropharynx:  No masses/lesions of the posterior pharyngeal wall. Tonsillar fossa without lesions. Soft palate without masses. Midline uvula.   Neck: No scars.  No cervical lymphadenopathy, thyromegaly or thyroid nodules.  Normal range of motion.    Face: House Brackmann I bilaterally.     Assessment/Plan  Problem List Items Addressed This Visit     None      Visit Diagnoses     Cold sore    -  Primary    History of cold sores.  No worrisome lesions today or eben abnormalities of the  lower lip.  Reassured.  RTC PRN.

## 2018-02-16 ENCOUNTER — OFFICE VISIT (OUTPATIENT)
Dept: CARDIOLOGY | Facility: CLINIC | Age: 68
End: 2018-02-16
Payer: COMMERCIAL

## 2018-02-16 VITALS
BODY MASS INDEX: 32.22 KG/M2 | HEIGHT: 72 IN | WEIGHT: 237.88 LBS | SYSTOLIC BLOOD PRESSURE: 134 MMHG | DIASTOLIC BLOOD PRESSURE: 70 MMHG | HEART RATE: 84 BPM

## 2018-02-16 DIAGNOSIS — R69 DIAGNOSIS DEFERRED: ICD-10-CM

## 2018-02-16 DIAGNOSIS — E66.09 CLASS 1 OBESITY DUE TO EXCESS CALORIES WITHOUT SERIOUS COMORBIDITY WITH BODY MASS INDEX (BMI) OF 32.0 TO 32.9 IN ADULT: ICD-10-CM

## 2018-02-16 DIAGNOSIS — E78.00 HYPERCHOLESTEREMIA: ICD-10-CM

## 2018-02-16 DIAGNOSIS — I10 ESSENTIAL HYPERTENSION: Primary | ICD-10-CM

## 2018-02-16 PROBLEM — E66.811 CLASS 1 OBESITY DUE TO EXCESS CALORIES WITHOUT SERIOUS COMORBIDITY WITH BODY MASS INDEX (BMI) OF 32.0 TO 32.9 IN ADULT: Status: ACTIVE | Noted: 2018-02-16

## 2018-02-16 PROBLEM — E63.9 POOR DIET: Status: ACTIVE | Noted: 2018-02-16

## 2018-02-16 PROCEDURE — 93000 ELECTROCARDIOGRAM COMPLETE: CPT | Mod: S$GLB,,, | Performed by: INTERNAL MEDICINE

## 2018-02-16 PROCEDURE — 99214 OFFICE O/P EST MOD 30 MIN: CPT | Mod: S$GLB,,, | Performed by: NURSE PRACTITIONER

## 2018-02-16 PROCEDURE — 1159F MED LIST DOCD IN RCRD: CPT | Mod: S$GLB,,, | Performed by: NURSE PRACTITIONER

## 2018-02-16 PROCEDURE — 1126F AMNT PAIN NOTED NONE PRSNT: CPT | Mod: S$GLB,,, | Performed by: NURSE PRACTITIONER

## 2018-02-16 PROCEDURE — 99999 PR PBB SHADOW E&M-EST. PATIENT-LVL III: CPT | Mod: PBBFAC,,, | Performed by: NURSE PRACTITIONER

## 2018-02-16 PROCEDURE — 3008F BODY MASS INDEX DOCD: CPT | Mod: S$GLB,,, | Performed by: NURSE PRACTITIONER

## 2018-02-16 RX ORDER — LISINOPRIL 20 MG/1
20 TABLET ORAL DAILY
Qty: 90 TABLET | Refills: 3 | Status: SHIPPED | OUTPATIENT
Start: 2018-02-16 | End: 2018-03-22 | Stop reason: SDUPTHER

## 2018-02-16 RX ORDER — MULTIVITAMIN
1 TABLET ORAL DAILY
COMMUNITY
End: 2019-04-11

## 2018-02-16 RX ORDER — ATORVASTATIN CALCIUM 20 MG/1
20 TABLET, FILM COATED ORAL DAILY
Qty: 90 TABLET | Refills: 3 | Status: SHIPPED | OUTPATIENT
Start: 2018-02-16 | End: 2018-06-18

## 2018-02-16 RX ORDER — ASPIRIN 81 MG/1
81 TABLET ORAL DAILY
Qty: 30 TABLET | Refills: 11
Start: 2018-02-16 | End: 2019-04-11

## 2018-02-16 NOTE — PROGRESS NOTES
Mr. Salcedo is a patient of Dr. Tompkins and was last seen in [facility] 2/3/2017 by Dr. Duval.      Subjective:   Patient ID:  Riky Salcedo is a 67 y.o. male who presents for follow-up of Hypertension and Hyperlipidemia  .   Problem List:  HTN  HLD  CACS 0 in 2014  Family history of MI (parents)    HPI:  Mr. Salcedo is a 68yo male with HTN and HLD here for annual follow up. CACS of 0. He has no cardiac complaints. Mr. Salcedo denies chest pain with exertion or at rest, palpitations, SOB, GLEASON, dizziness, syncope, leg edema, claudication, PND, or orthopnea. He says he does not exercise and works very erratic shifts. He also says he does not eat well and has a very poor diet but his daughter who is a nursing student is going to help him on diet. He says he spends nights playing [employer] and drinking beer all night.     He is currently taking pravastatin 20mg for lipid management. .6 on 12/23/2017. He is also taking lisinopril 20mg. BPs are 120s-130s systolic. Creatinine is 0.9. K is 4.5. Hepatic transaminases are within normal limits. HA1C is 6. ASCVD risk is 23.3%.    Recent Cardiac Tests:    [facility] (1/21/2016):  CONCLUSIONS     1 - Normal left ventricular systolic function (EF 60-65%).     2 - Normal left ventricular diastolic function.     3 - Normal right ventricular systolic function .   No evidence of stress induced myocardial ischemia.     Current Outpatient Prescriptions   Medication Sig    desonide 0.05% (DESOWEN) 0.05 % Oint AAA on scrotum BID x 1-2 wks then prn flares only    lisinopril (PRINIVIL,ZESTRIL) 20 MG tablet TAKE ONE TABLET BY MOUTH ONCE DAILY    multivitamin (ONE DAILY MULTIVITAMIN) per tablet Take 1 tablet by mouth once daily.     No current facility-administered medications for this visit.        Review of Systems   Constitution: Negative for malaise/fatigue.   Eyes: Negative for blurred vision.   Cardiovascular: Negative for chest pain, claudication, dyspnea on exertion,  irregular heartbeat, leg swelling, orthopnea, palpitations, paroxysmal nocturnal dyspnea and syncope.   Respiratory: Negative for shortness of breath.    Hematologic/Lymphatic: Negative for bleeding problem.   Skin: Negative for rash.   Musculoskeletal: Negative for myalgias.   Gastrointestinal: Negative for abdominal pain, constipation, diarrhea and nausea.   Genitourinary: Negative for hematuria.   Neurological: Negative for headaches, loss of balance and numbness.   Psychiatric/Behavioral: Negative for altered mental status.   Allergic/Immunologic: Negative for persistent infections.     Objective:        /70   Pulse 84   Ht 6' (1.829 m)   Wt 107.9 kg (237 lb 14 oz)   BMI 32.26 kg/m²     Physical Exam   Constitutional: He is oriented to person, place, and time. He appears well-developed and well-nourished.   HENT:   Head: Normocephalic.   Nose: Nose normal.   Eyes: Pupils are equal, round, and reactive to light.   Neck: No JVD present. Carotid bruit is not present.   Cardiovascular: Normal rate, regular rhythm, S1 normal and S2 normal.  Exam reveals no gallop.    No murmur heard.  Pulses:       Carotid pulses are 2+ on the right side, and 2+ on the left side.       Radial pulses are 2+ on the right side, and 2+ on the left side.        Dorsalis pedis pulses are 2+ on the right side, and 2+ on the left side.   Pulmonary/Chest: Breath sounds normal. No respiratory distress.   Abdominal: Soft. Bowel sounds are normal. He exhibits no distension. There is no tenderness.   Musculoskeletal: Normal range of motion. He exhibits no edema.   Neurological: He is alert and oriented to person, place, and time.   Skin: Skin is warm and dry. No erythema.   Psychiatric: He has a normal mood and affect. His speech is normal and behavior is normal.   Nursing note and vitals reviewed.    Lab Results   Component Value Date     12/23/2017    K 4.5 12/23/2017     12/23/2017    CO2 28 12/23/2017    BUN 18  12/23/2017    CREATININE 0.9 12/23/2017    GLU 99 12/23/2017    HGBA1C 6.0 03/24/2015    AST 17 12/23/2017    ALT 22 12/23/2017    ALBUMIN 3.7 03/16/2015    PROT 7.2 03/16/2015    BILITOT 0.5 03/16/2015    WBC 6.75 03/16/2015    HGB 14.7 03/16/2015    HCT 43.3 03/16/2015    MCV 89 03/16/2015     03/16/2015    TSH 2.005 05/21/2012    CHOL 220 (H) 12/23/2017    HDL 44 12/23/2017    LDLCALC 139.6 12/23/2017    TRIG 182 (H) 12/23/2017          Test(s) Reviewed  I have reviewed the following in detail:  [] Stress test   [] Angiography   [] Echocardiogram   [x] Labs   [x] Other:  EKG - no changes     Assessment:         1. Essential hypertension. Controlled on lisinopril.        2. Hypercholesteremia. .6. Non-HDL cholesterol 176. ASCVD risk 23.3%. Discussed importance of lowering cholesterol. Discussed lifestyle changes. Patient advised to consume a low salt, heart healthy diet. Patient agreed to start atorvastatin 20mg.      3. Class 1 obesity due to excess calories without serious comorbidity with body mass index (BMI) of 32.0 to 32.9 in adult. BMI 32.26. Patient has been encouraged to exercise a minimum of 30 minutes daily, 5 times per week.   .      Plan:     Riky was seen today for hypertension and hyperlipidemia.    Diagnoses and all orders for this visit:    Essential hypertension  -     IN OFFICE EKG 12-LEAD (to Mario Alberto); Future  -     lisinopril (PRINIVIL,ZESTRIL) 20 MG tablet; Take 1 tablet (20 mg total) by mouth once daily.  -     aspirin (ECOTRIN) 81 MG EC tablet; Take 1 tablet (81 mg total) by mouth once daily.    Hypercholesteremia  -     atorvastatin (LIPITOR) 20 MG tablet; Take 1 tablet (20 mg total) by mouth once daily.  -     Lipid panel; Future; Expected date: 05/16/2018  -     ALT (SGPT); Future; Expected date: 05/16/2018  -     AST (SGOT); Future; Expected date: 05/16/2018    Class 1 obesity due to excess calories without serious comorbidity with body mass index (BMI) of 32.0 to 32.9 in  "adult      Other orders  -     multivitamin (ONE DAILY MULTIVITAMIN) per tablet; Take 1 tablet by mouth once daily.      Start aspirin 81mg ("baby aspirin") once daily. Start atorvastatin 20mg.   Continue other medications.  Fasting blood test in 3 months.   Patient has been encouraged to exercise a minimum of 30 minutes daily, 5 times per week.   Patient advised to consume a low salt, heart healthy diet.   Return to clinic in one year.    Follow-up in about 1 year (around 2/16/2019).    ------------------------------------------------------------------    ЕКАТЕРИНА Sauceda, NP-C  Consult Cardiology  "

## 2018-02-16 NOTE — PATIENT INSTRUCTIONS
"Start aspirin 81mg ("baby aspirin") once daily. Start atorvastatin 20mg.   Continue other medications.  Fasting blood test in 3 months.   Patient has been encouraged to exercise a minimum of 30 minutes daily, 5 times per week.   Patient advised to consume a low salt, heart healthy diet.   Return to clinic in one year.  ====  If your healthcare provider is prescribing a new medication even if for a short time, always ask if it would interfere with the cholesterol meds that you are taking.    LDL - bad type - improves with diet and medications: typically statins; most other medications that lower LDL have not been proven to prevent heart attacks.  May not improve significantly with exercise alone.  Ideally less than 100 mg/dl. If you have coronary artery disease, this should be well below 70 mg/dl.    HDL - good type - improves with exercise.  Ideally greater than 50 mg/dl.    TGs (triglycerides) - also bad - can change very quickly and considerably with certain foods. Improve with diet and exercise  In some cases a low carbohydrate diet will lower TGs better than a low fat diet.  Ideal range  mg/dl.    Sugar, fat and cholesterol in food:     A sensible diet that limits the intake of sugars, saturated (bad) fats and trans fats while increasing the intake of unsaturated (good)  fats and plant proteins is the basis of the current dietary recommendations.      We now recommend drastically reducing the intake of sugar. There is less emphasis on excluding fat.     Cholesterol in our food is no longer a significant concern, mainly because it is generally present in small amounts. However please do not confuse this with the role of cholesterol in our blood and arteries. Make no mistake, it is cholesterol that clogs up our arteries whether it comes from our food or is manufactured by our bodies.       Most foods that are high in cholesterol are also high in saturated fat. But there is way more saturated fat than " cholesterol in these foods. So its the saturated fat content that matters more than cholesterol. On the other hand there are a handful of foods that are high in cholesterol but do not contain much saturated fat: eggs, shrimp, crab legs and crawfish are OK to eat.       Saturated fat is the bad fat - you should limit your intake of this. Deep fried foods, meats and other animal fats are high in saturated fat. Cookies, donuts and most dessert and cakes are usually high in both saturated fat and sugar.       Unsaturated fat is the good fat. It contains the same number of calories as saturated fat but these fats do not get deposited in our arteries. The Mediterranean style diet encourages the intake of unsaturated fat - olive oil, avocado and unsalted nuts. So instead of baking a piece of fish, it may be better to pan-gan it using olive oil.     You should eat a few servings of vegetables (and fruit as long as you are not diabetic) everyday. Substitute some plant proteins in place of meat: beans, lentils, quinoa and oatmeal. They are lean proteins.     Do not use stick butter or stick margarine. Butter that comes in a tub is soft butter. It consists of 1/2 butter and 1/2 vegetable oil., either canola or olive oil It is fine to use that.       Trans fats should definitely be avoided. Most foods that are labelled as containing 0 gms of trans fat can still contain several hundred milligrams of trans fat: creamer, margarine, refrigerator dough, deep fried foods, ready made frosting, potato, corn and torilla chips, cakes, cookies, pie crusts and crackers containing shortening made with hydrogenated vegetable oil.

## 2018-02-19 DIAGNOSIS — I10 ESSENTIAL HYPERTENSION: Primary | ICD-10-CM

## 2018-02-19 DIAGNOSIS — E78.00 HYPERCHOLESTEROLEMIA: ICD-10-CM

## 2018-03-03 ENCOUNTER — PATIENT MESSAGE (OUTPATIENT)
Dept: FAMILY MEDICINE | Facility: CLINIC | Age: 68
End: 2018-03-03

## 2018-03-07 ENCOUNTER — TELEPHONE (OUTPATIENT)
Dept: FAMILY MEDICINE | Facility: CLINIC | Age: 68
End: 2018-03-07

## 2018-03-07 DIAGNOSIS — E78.00 HYPERCHOLESTEREMIA: Primary | ICD-10-CM

## 2018-03-07 DIAGNOSIS — I10 ESSENTIAL HYPERTENSION: ICD-10-CM

## 2018-03-10 ENCOUNTER — LAB VISIT (OUTPATIENT)
Dept: LAB | Facility: HOSPITAL | Age: 68
End: 2018-03-10
Attending: FAMILY MEDICINE
Payer: COMMERCIAL

## 2018-03-10 DIAGNOSIS — I10 ESSENTIAL HYPERTENSION: ICD-10-CM

## 2018-03-10 DIAGNOSIS — E78.00 HYPERCHOLESTEREMIA: ICD-10-CM

## 2018-03-10 LAB
ALBUMIN SERPL BCP-MCNC: 3.8 G/DL
ALP SERPL-CCNC: 83 U/L
ALT SERPL W/O P-5'-P-CCNC: 25 U/L
ANION GAP SERPL CALC-SCNC: 9 MMOL/L
AST SERPL-CCNC: 20 U/L
BASOPHILS # BLD AUTO: 0.06 K/UL
BASOPHILS NFR BLD: 0.9 %
BILIRUB SERPL-MCNC: 0.4 MG/DL
BUN SERPL-MCNC: 17 MG/DL
CALCIUM SERPL-MCNC: 9.5 MG/DL
CHLORIDE SERPL-SCNC: 107 MMOL/L
CO2 SERPL-SCNC: 25 MMOL/L
CREAT SERPL-MCNC: 1 MG/DL
DIFFERENTIAL METHOD: NORMAL
EOSINOPHIL # BLD AUTO: 0.2 K/UL
EOSINOPHIL NFR BLD: 2.8 %
ERYTHROCYTE [DISTWIDTH] IN BLOOD BY AUTOMATED COUNT: 13.1 %
EST. GFR  (AFRICAN AMERICAN): >60 ML/MIN/1.73 M^2
EST. GFR  (NON AFRICAN AMERICAN): >60 ML/MIN/1.73 M^2
GLUCOSE SERPL-MCNC: 109 MG/DL
HCT VFR BLD AUTO: 44.9 %
HGB BLD-MCNC: 15 G/DL
IMM GRANULOCYTES # BLD AUTO: 0.03 K/UL
IMM GRANULOCYTES NFR BLD AUTO: 0.4 %
LYMPHOCYTES # BLD AUTO: 1.9 K/UL
LYMPHOCYTES NFR BLD: 28.5 %
MCH RBC QN AUTO: 29.9 PG
MCHC RBC AUTO-ENTMCNC: 33.4 G/DL
MCV RBC AUTO: 90 FL
MONOCYTES # BLD AUTO: 0.4 K/UL
MONOCYTES NFR BLD: 6.5 %
NEUTROPHILS # BLD AUTO: 4.1 K/UL
NEUTROPHILS NFR BLD: 60.9 %
NRBC BLD-RTO: 0 /100 WBC
PLATELET # BLD AUTO: 263 K/UL
PMV BLD AUTO: 10.9 FL
POTASSIUM SERPL-SCNC: 4.8 MMOL/L
PROT SERPL-MCNC: 7.4 G/DL
RBC # BLD AUTO: 5.01 M/UL
SODIUM SERPL-SCNC: 141 MMOL/L
TSH SERPL DL<=0.005 MIU/L-ACNC: 1.83 UIU/ML
WBC # BLD AUTO: 6.73 K/UL

## 2018-03-10 PROCEDURE — 36415 COLL VENOUS BLD VENIPUNCTURE: CPT | Mod: PO

## 2018-03-10 PROCEDURE — 84443 ASSAY THYROID STIM HORMONE: CPT

## 2018-03-10 PROCEDURE — 80053 COMPREHEN METABOLIC PANEL: CPT

## 2018-03-10 PROCEDURE — 85025 COMPLETE CBC W/AUTO DIFF WBC: CPT

## 2018-03-22 ENCOUNTER — OFFICE VISIT (OUTPATIENT)
Dept: FAMILY MEDICINE | Facility: CLINIC | Age: 68
End: 2018-03-22
Payer: COMMERCIAL

## 2018-03-22 VITALS
DIASTOLIC BLOOD PRESSURE: 80 MMHG | BODY MASS INDEX: 31.95 KG/M2 | SYSTOLIC BLOOD PRESSURE: 140 MMHG | HEIGHT: 72 IN | WEIGHT: 235.88 LBS | HEART RATE: 86 BPM

## 2018-03-22 DIAGNOSIS — I10 ESSENTIAL HYPERTENSION: ICD-10-CM

## 2018-03-22 DIAGNOSIS — E78.00 HYPERCHOLESTEREMIA: ICD-10-CM

## 2018-03-22 DIAGNOSIS — Z00.00 ANNUAL PHYSICAL EXAM: Primary | ICD-10-CM

## 2018-03-22 DIAGNOSIS — B00.2 ORAL HERPES SIMPLEX INFECTION: ICD-10-CM

## 2018-03-22 PROCEDURE — 99999 PR PBB SHADOW E&M-EST. PATIENT-LVL III: CPT | Mod: PBBFAC,,, | Performed by: FAMILY MEDICINE

## 2018-03-22 PROCEDURE — 99397 PER PM REEVAL EST PAT 65+ YR: CPT | Mod: S$GLB,,, | Performed by: FAMILY MEDICINE

## 2018-03-22 RX ORDER — VALACYCLOVIR HYDROCHLORIDE 1 G/1
1000 TABLET, FILM COATED ORAL 2 TIMES DAILY
Qty: 6 TABLET | Refills: 6 | Status: SHIPPED | OUTPATIENT
Start: 2018-03-22 | End: 2019-08-27 | Stop reason: SDUPTHER

## 2018-03-22 RX ORDER — LISINOPRIL 20 MG/1
20 TABLET ORAL DAILY
Qty: 90 TABLET | Refills: 3 | Status: SHIPPED | OUTPATIENT
Start: 2018-03-22 | End: 2019-04-18 | Stop reason: SDUPTHER

## 2018-03-22 NOTE — PROGRESS NOTES
Subjective:       Patient ID: Riky Salcedo is a 67 y.o. male.    Chief Complaint: Annual Exam    67 years old male came to the clinic for his physical examination.  Patient requests his medicine for herpes flareups.  Blood pressure today stable.  No chest pain palpitations orthopnea or PND.  Patient currently taking cholesterol medicine.      Review of Systems   Constitutional: Negative.    HENT: Negative.    Eyes: Negative.    Respiratory: Negative.    Cardiovascular: Negative.  Negative for chest pain, palpitations and leg swelling.   Gastrointestinal: Negative.    Genitourinary: Negative.    Musculoskeletal: Negative.    Skin: Negative.    Neurological: Negative.    Psychiatric/Behavioral: Negative.        Objective:      Physical Exam   Constitutional: He is oriented to person, place, and time. He appears well-developed and well-nourished. No distress.   HENT:   Head: Normocephalic and atraumatic.   Right Ear: External ear normal.   Left Ear: External ear normal.   Nose: Nose normal.   Mouth/Throat: Oropharynx is clear and moist. No oropharyngeal exudate.   Eyes: Conjunctivae and EOM are normal. Pupils are equal, round, and reactive to light. Right eye exhibits no discharge. Left eye exhibits no discharge. No scleral icterus.   Neck: Normal range of motion. Neck supple. No JVD present. No tracheal deviation present. No thyromegaly present.   Cardiovascular: Normal rate, regular rhythm, normal heart sounds and intact distal pulses.  Exam reveals no gallop and no friction rub.    No murmur heard.  Pulmonary/Chest: Effort normal and breath sounds normal. No stridor. No respiratory distress. He has no wheezes. He has no rales. He exhibits no tenderness.   Abdominal: Soft. Bowel sounds are normal. He exhibits no distension and no mass. There is no tenderness. There is no rebound and no guarding.   Musculoskeletal: Normal range of motion. He exhibits no edema or tenderness.   Lymphadenopathy:     He has no  cervical adenopathy.   Neurological: He is alert and oriented to person, place, and time. He has normal reflexes. He displays normal reflexes. No cranial nerve deficit. He exhibits normal muscle tone. Coordination normal.   Skin: Skin is warm and dry. No rash noted. He is not diaphoretic. No erythema. No pallor.   Psychiatric: He has a normal mood and affect. His behavior is normal. Judgment and thought content normal.   Nursing note and vitals reviewed.      Assessment:       1. Annual physical exam    2. Essential hypertension    3. Hypercholesteremia    4. Oral herpes simplex infection        Plan:         Riky was seen today for annual exam.    Diagnoses and all orders for this visit:    Annual physical exam    Essential hypertension  -     lisinopril (PRINIVIL,ZESTRIL) 20 MG tablet; Take 1 tablet (20 mg total) by mouth once daily.    Hypercholesteremia    Oral herpes simplex infection  -     valACYclovir (VALTREX) 1000 MG tablet; Take 1 tablet (1,000 mg total) by mouth 2 (two) times daily.    Continue monitoring blood pressure at home, low sodium diet.   Diet and physical activity to promote weight loss.

## 2018-04-20 ENCOUNTER — OFFICE VISIT (OUTPATIENT)
Dept: DERMATOLOGY | Facility: CLINIC | Age: 68
End: 2018-04-20
Payer: COMMERCIAL

## 2018-04-20 DIAGNOSIS — L08.9 INFECTED SEBACEOUS CYST: Primary | ICD-10-CM

## 2018-04-20 DIAGNOSIS — L72.3 INFECTED SEBACEOUS CYST: Primary | ICD-10-CM

## 2018-04-20 PROCEDURE — 10060 I&D ABSCESS SIMPLE/SINGLE: CPT | Mod: S$GLB,,, | Performed by: NURSE PRACTITIONER

## 2018-04-20 PROCEDURE — 99201 PR OFFICE/OUTPT VISIT,NEW,LEVL I: CPT | Mod: 25,S$GLB,, | Performed by: NURSE PRACTITIONER

## 2018-04-20 PROCEDURE — 99999 PR PBB SHADOW E&M-EST. PATIENT-LVL III: CPT | Mod: PBBFAC,,, | Performed by: NURSE PRACTITIONER

## 2018-04-20 RX ORDER — DOXYCYCLINE 100 MG/1
TABLET ORAL
Qty: 20 TABLET | Refills: 0 | Status: SHIPPED | OUTPATIENT
Start: 2018-04-20 | End: 2018-06-28 | Stop reason: HOSPADM

## 2018-04-20 NOTE — PATIENT INSTRUCTIONS

## 2018-04-20 NOTE — PROGRESS NOTES
Subjective:       Patient ID:  Riky Salcedo is a 68 y.o. male who presents for   Chief Complaint   Patient presents with    Cyst     Last seen by [facility] 3/4/15      Cyst  - Initial  Affected locations: back  Duration: 1 week  Signs / symptoms: tender, inflamed and redness  Severity: mild to moderate  Timing: constant  Aggravated by: friction  Relieving factors/Treatments tried: nothing  Improvement on treatment: no relief        Review of Systems   Constitutional: Negative for fever.   Skin: Negative for itching, rash and tendency to form keloidal scars.   Hematologic/Lymphatic: Does not bruise/bleed easily.        Objective:    Physical Exam   Constitutional: He appears well-developed and well-nourished. He is obese.  No distress.   Neurological: He is alert and oriented to person, place, and time. He is not disoriented.   Psychiatric: He has a normal mood and affect.   Skin:   Areas Examined (abnormalities noted in diagram):   Back Inspection Performed              Diagram Legend     Erythematous scaling macule/papule c/w actinic keratosis       Vascular papule c/w angioma      Pigmented verrucoid papule/plaque c/w seborrheic keratosis      Yellow umbilicated papule c/w sebaceous hyperplasia      Irregularly shaped tan macule c/w lentigo     1-2 mm smooth white papules consistent with Milia      Movable subcutaneous cyst with punctum c/w epidermal inclusion cyst      Subcutaneous movable cyst c/w pilar cyst      Firm pink to brown papule c/w dermatofibroma      Pedunculated fleshy papule(s) c/w skin tag(s)      Evenly pigmented macule c/w junctional nevus     Mildly variegated pigmented, slightly irregular-bordered macule c/w mildly atypical nevus      Flesh colored to evenly pigmented papule c/w intradermal nevus       Pink pearly papule/plaque c/w basal cell carcinoma      Erythematous hyperkeratotic cursted plaque c/w SCC      Surgical scar with no sign of skin cancer recurrence      Open and closed  comedones      Inflammatory papules and pustules      Verrucoid papule consistent consistent with wart     Erythematous eczematous patches and plaques     Dystrophic onycholytic nail with subungual debris c/w onychomycosis     Umbilicated papule    Erythematous-base heme-crusted tan verrucoid plaque consistent with inflamed seborrheic keratosis     Erythematous Silvery Scaling Plaque c/w Psoriasis     See annotation      Assessment / Plan:        Infected sebaceous cyst  -     doxycycline monohydrate 100 mg Tab; Take 1 po BID x 10 days  Dispense: 20 tablet; Refill: 0    Lesion incised with #11 blade and drained on today's date.  Defect packed with iodoform gauze. Instructions for removal provided to patient.   Patient instructed to perform warm compresses 2 - 3 times/daily.             Follow-up in about 2 weeks (around 5/4/2018).

## 2018-05-01 ENCOUNTER — OFFICE VISIT (OUTPATIENT)
Dept: DERMATOLOGY | Facility: CLINIC | Age: 68
End: 2018-05-01
Payer: COMMERCIAL

## 2018-05-01 DIAGNOSIS — L72.9 CYST OF SKIN: Primary | ICD-10-CM

## 2018-05-01 PROCEDURE — 99499 UNLISTED E&M SERVICE: CPT | Mod: S$GLB,,, | Performed by: NURSE PRACTITIONER

## 2018-05-01 PROCEDURE — 99999 PR PBB SHADOW E&M-EST. PATIENT-LVL III: CPT | Mod: PBBFAC,,, | Performed by: NURSE PRACTITIONER

## 2018-05-01 NOTE — PROGRESS NOTES
Subjective:       Patient ID:  Riky Salcedo is a 68 y.o. male who presents for   Chief Complaint   Patient presents with    Cyst     back follow up     Cyst  - Follow-up  Symptom course: improving  Currently using: Last seen 4/20/18- I&D performed. LAst dose of doxy - yesterday.  Affected locations: torso  Signs / symptoms: asymptomatic        Review of Systems   Constitutional: Negative for fever.   Skin: Negative for itching, rash and tendency to form keloidal scars.   Hematologic/Lymphatic: Does not bruise/bleed easily.        Objective:    Physical Exam   Constitutional: He appears well-developed and well-nourished. He is obese.  No distress.   Neurological: He is alert and oriented to person, place, and time. He is not disoriented.   Psychiatric: He has a normal mood and affect.   Skin:   Areas Examined (abnormalities noted in diagram):   Back Inspection Performed              Diagram Legend     Erythematous scaling macule/papule c/w actinic keratosis       Vascular papule c/w angioma      Pigmented verrucoid papule/plaque c/w seborrheic keratosis      Yellow umbilicated papule c/w sebaceous hyperplasia      Irregularly shaped tan macule c/w lentigo     1-2 mm smooth white papules consistent with Milia      Movable subcutaneous cyst with punctum c/w epidermal inclusion cyst      Subcutaneous movable cyst c/w pilar cyst      Firm pink to brown papule c/w dermatofibroma      Pedunculated fleshy papule(s) c/w skin tag(s)      Evenly pigmented macule c/w junctional nevus     Mildly variegated pigmented, slightly irregular-bordered macule c/w mildly atypical nevus      Flesh colored to evenly pigmented papule c/w intradermal nevus       Pink pearly papule/plaque c/w basal cell carcinoma      Erythematous hyperkeratotic cursted plaque c/w SCC      Surgical scar with no sign of skin cancer recurrence      Open and closed comedones      Inflammatory papules and pustules      Verrucoid papule consistent  consistent with wart     Erythematous eczematous patches and plaques     Dystrophic onycholytic nail with subungual debris c/w onychomycosis     Umbilicated papule    Erythematous-base heme-crusted tan verrucoid plaque consistent with inflamed seborrheic keratosis     Erythematous Silvery Scaling Plaque c/w Psoriasis     See annotation      Assessment / Plan:        Cyst of skin  Healing S/P I&D  Not further action needed.            Follow-up if symptoms worsen or fail to improve.

## 2018-06-11 ENCOUNTER — TELEPHONE (OUTPATIENT)
Dept: PLASTIC SURGERY | Facility: CLINIC | Age: 68
End: 2018-06-11

## 2018-06-11 NOTE — TELEPHONE ENCOUNTER
----- Message from Carlos Walters sent at 6/11/2018  1:00 PM CDT -----  Patient Returning Call from [facility]    Who Left Message for Patient: Twila  Communication Preference: By phone at [phone]   Additional Information:

## 2018-06-11 NOTE — TELEPHONE ENCOUNTER
Pt called back re: lipoma on back. Pt decided to have general surgeon do procedure instead due to possibly having delayed date > one month. Pt will call back if general surgeon isn't able to accommodate his request for having surgery soon.

## 2018-06-11 NOTE — TELEPHONE ENCOUNTER
----- Message from Carlos Walters sent at 6/11/2018  8:51 AM CDT -----  Pt said he would like a consult to have a lipoma removed from his back. Pt has seen a general surgeon and dermatology. Pt said he has did his research and would like for it to be removed by a plastic surgeon. Please call pt regarding this at [phone]

## 2018-06-18 ENCOUNTER — TELEPHONE (OUTPATIENT)
Dept: CARDIOLOGY | Facility: CLINIC | Age: 68
End: 2018-06-18

## 2018-06-18 NOTE — TELEPHONE ENCOUNTER
Called pt to schedule follow up labs since starting atorvastatin. Pt states he has not taking atorvastatin as recently ordered.Medcard updated.

## 2018-06-21 ENCOUNTER — TELEPHONE (OUTPATIENT)
Dept: UROLOGY | Facility: CLINIC | Age: 68
End: 2018-06-21

## 2018-06-21 NOTE — TELEPHONE ENCOUNTER
----- Message from Glenda Castellanos sent at 6/21/2018 11:21 AM CDT -----  Please advise  ----- Message -----  From: Tanya Youssef  Sent: 6/21/2018   7:12 AM  To: [facility] Staff    No. [phone]    Patient had a cyst on his kidney last year.   Does he need to schedule a follow up appointment.    Please call.

## 2018-06-28 ENCOUNTER — LAB VISIT (OUTPATIENT)
Dept: LAB | Facility: HOSPITAL | Age: 68
End: 2018-06-28
Attending: UROLOGY
Payer: COMMERCIAL

## 2018-06-28 ENCOUNTER — OFFICE VISIT (OUTPATIENT)
Dept: UROLOGY | Facility: CLINIC | Age: 68
End: 2018-06-28
Payer: COMMERCIAL

## 2018-06-28 VITALS
TEMPERATURE: 99 F | DIASTOLIC BLOOD PRESSURE: 71 MMHG | HEART RATE: 88 BPM | WEIGHT: 235 LBS | HEIGHT: 72 IN | BODY MASS INDEX: 31.83 KG/M2 | SYSTOLIC BLOOD PRESSURE: 118 MMHG

## 2018-06-28 DIAGNOSIS — N50.3 EPIDIDYMAL CYST: ICD-10-CM

## 2018-06-28 DIAGNOSIS — Z87.440 HISTORY OF UTI: ICD-10-CM

## 2018-06-28 DIAGNOSIS — Z12.5 PROSTATE CANCER SCREENING: ICD-10-CM

## 2018-06-28 DIAGNOSIS — N13.30 HYDRONEPHROSIS, UNSPECIFIED HYDRONEPHROSIS TYPE: ICD-10-CM

## 2018-06-28 DIAGNOSIS — N13.8 BPH WITH OBSTRUCTION/LOWER URINARY TRACT SYMPTOMS: Primary | ICD-10-CM

## 2018-06-28 DIAGNOSIS — N28.1 BILATERAL RENAL CYSTS: ICD-10-CM

## 2018-06-28 DIAGNOSIS — N40.1 BPH WITH OBSTRUCTION/LOWER URINARY TRACT SYMPTOMS: Primary | ICD-10-CM

## 2018-06-28 LAB
BILIRUB SERPL-MCNC: NORMAL MG/DL
BLOOD URINE, POC: NORMAL
COLOR, POC UA: YELLOW
COMPLEXED PSA SERPL-MCNC: 0.63 NG/ML
GLUCOSE UR QL STRIP: NORMAL
KETONES UR QL STRIP: NORMAL
LEUKOCYTE ESTERASE URINE, POC: NORMAL
NITRITE, POC UA: NORMAL
PH, POC UA: 5
PROTEIN, POC: NORMAL
SPECIFIC GRAVITY, POC UA: 1.01
UROBILINOGEN, POC UA: NORMAL

## 2018-06-28 PROCEDURE — 99214 OFFICE O/P EST MOD 30 MIN: CPT | Mod: 25,S$GLB,, | Performed by: UROLOGY

## 2018-06-28 PROCEDURE — 81002 URINALYSIS NONAUTO W/O SCOPE: CPT | Mod: S$GLB,,, | Performed by: UROLOGY

## 2018-06-28 PROCEDURE — 3074F SYST BP LT 130 MM HG: CPT | Mod: CPTII,S$GLB,, | Performed by: UROLOGY

## 2018-06-28 PROCEDURE — 3078F DIAST BP <80 MM HG: CPT | Mod: CPTII,S$GLB,, | Performed by: UROLOGY

## 2018-06-28 PROCEDURE — 36415 COLL VENOUS BLD VENIPUNCTURE: CPT

## 2018-06-28 PROCEDURE — 87086 URINE CULTURE/COLONY COUNT: CPT

## 2018-06-28 PROCEDURE — 84153 ASSAY OF PSA TOTAL: CPT

## 2018-06-28 PROCEDURE — 99999 PR PBB SHADOW E&M-EST. PATIENT-LVL III: CPT | Mod: PBBFAC,,, | Performed by: UROLOGY

## 2018-06-28 NOTE — PROGRESS NOTES
This patient was last seen by me in March of last year for evaluation of urinary frequency for which patient did not want medical therapy.  Previous ultrasound showed possible hydronephrosis therefore repeat ultrasound was obtained which showed no evidence of hydronephrosis.  Benign renal cysts noted.  Patient requested screening PSA which was obtained and came back normal at 0.7    At his last office visit urine culture came back E coli which was treated appropriately    Patient comes now for recheck.    He continues to have some urinary frequency with no dysuria or gross hematuria.  He has some decreased force of stream with nocturia times 0.  Overall he is satisfied with his voiding pattern    Physical exam reveals reveals a well-developed well-nourished patient  in no acute distress.  Patient is alert and oriented ×3 with normal mood and affect.  Respiratory effort is normal and there is no peripheral edema.  Skin is normal to inspection and palpation.  Penis/perineum without lesions, scrotum without rash/cysts, epididymis nontender bilaterally, urethral meatus in normal location normal size, no penile plaques palpated, prostate:   Smooth, enlarged, and benign-feeling                    seminal vesicles not palpated.  No rectal masses, sphincter tone normal.  Testes equal in size without masses    /71 (BP Location: Left arm, Patient Position: Sitting, BP Method: Large (Automatic))   Pulse 88   Temp 98.5 °F (36.9 °C) (Oral)   Ht 6' (1.829 m)   Wt 106.6 kg (235 lb)   BMI 31.87 kg/m²   Review of Systems  General ROS: negative for chills, fever or weight loss  Respiratory ROS: no cough, shortness of breath, or wheezing  Cardiovascular ROS: no chest pain or dyspnea on exertion  Musculoskeletal ROS: negative for gait disturbance or muscular weakness    Family History   Problem Relation Age of Onset    Heart disease Mother     Heart disease Father     Hypertension Father     Heart disease Paternal  Grandmother     Hypertension Paternal Grandmother     Colon cancer Neg Hx     Crohn's disease Neg Hx     Ulcerative colitis Neg Hx     Melanoma Neg Hx     Prostate cancer Neg Hx     Kidney disease Neg Hx      Past Medical History:   Diagnosis Date    Fever blister     HSV-1 (herpes simplex virus 1) infection     Hyperlipidemia     Hypertension     Keloid cicatrix     Low serum testosterone level     Obese     Peyronie's disease      Family History   Problem Relation Age of Onset    Heart disease Mother     Heart disease Father     Hypertension Father     Heart disease Paternal Grandmother     Hypertension Paternal Grandmother     Colon cancer Neg Hx     Crohn's disease Neg Hx     Ulcerative colitis Neg Hx     Melanoma Neg Hx     Prostate cancer Neg Hx     Kidney disease Neg Hx      Social History   Substance Use Topics    Smoking status: Former Smoker     Types: Cigars    Smokeless tobacco: Former User    Alcohol use 2.5 oz/week     5 Standard drinks or equivalent per week      Comment: 4-5 beers a day       Impression:      ICD-10-CM ICD-9-CM    1. BPH with obstruction/lower urinary tract symptoms N40.1 600.01     N13.8 599.69    2. History of UTI Z87.440 V13.02 POCT URINE DIPSTICK WITHOUT MICROSCOPE      Urine culture   3. Hydronephrosis, unspecified hydronephrosis type N13.30 591    4. Epididymal cyst N50.3 608.89    5. Bilateral renal cysts N28.1 753.10    6. Prostate cancer screening Z12.5 V76.44 PSA, Screening       Plan:    #1.  BPH.  Plan.  I again discussed medical therapy for voiding symptoms.  Patient states that he is satisfied with his voiding pattern on no BPH medicines    2.  History of urinary tract infection.  Patient's urine will be cultured and once results are available ,we will contact the patient if antibiotics are indicated.    3.  History of hydronephrosis which on repeat ultrasound had resolved.  No further evaluation needed.    4.  Epididymal cyst present on  scrotal ultrasound not present on physical exam.  Not clinically significant    5.   Renal cyst.  I discussed the benign nature of renal cysts and that no intervention and no further evaluation is needed.    6.  Prostate cancer screening.  I discussed risks and benefits and controversy concerning prostate cancer screening.  Patient voices understanding and wants to have it done.  Will check PSA today and contact patient with any significant finding    At this point follow up on an as-needed basis  PLEASE NOTE:  Please be advised that portions of this note were dictated using voice recognition software and may contain dictation related errors in spelling/grammar/appropriate pronouns/syntax or other errors that might have not been found and or corrected on text review.

## 2018-06-29 LAB — BACTERIA UR CULT: NO GROWTH

## 2018-08-27 ENCOUNTER — OFFICE VISIT (OUTPATIENT)
Dept: UROLOGY | Facility: CLINIC | Age: 68
End: 2018-08-27
Payer: COMMERCIAL

## 2018-08-27 VITALS
HEIGHT: 72 IN | HEART RATE: 81 BPM | BODY MASS INDEX: 31.65 KG/M2 | DIASTOLIC BLOOD PRESSURE: 76 MMHG | WEIGHT: 233.69 LBS | SYSTOLIC BLOOD PRESSURE: 124 MMHG

## 2018-08-27 DIAGNOSIS — Z30.2 ENCOUNTER FOR MALE STERILIZATION PROCEDURE: Primary | ICD-10-CM

## 2018-08-27 PROCEDURE — 3078F DIAST BP <80 MM HG: CPT | Mod: CPTII,S$GLB,, | Performed by: UROLOGY

## 2018-08-27 PROCEDURE — 99999 PR PBB SHADOW E&M-EST. PATIENT-LVL III: CPT | Mod: PBBFAC,,, | Performed by: UROLOGY

## 2018-08-27 PROCEDURE — 99213 OFFICE O/P EST LOW 20 MIN: CPT | Mod: S$GLB,,, | Performed by: UROLOGY

## 2018-08-27 PROCEDURE — 3074F SYST BP LT 130 MM HG: CPT | Mod: CPTII,S$GLB,, | Performed by: UROLOGY

## 2018-08-27 RX ORDER — VALACYCLOVIR HYDROCHLORIDE 1 G/1
1000 TABLET, FILM COATED ORAL
COMMUNITY
End: 2019-08-27

## 2018-08-27 NOTE — PROGRESS NOTES
Chief Complaint:   Undesired fertility    HPI:  Mr. Salcedo is a 68 y.o.  male who presents for evaluation re vasectomy. He is certain that he desires no more children. His wife is 51 years old!    He denies ED.    He denies orchalgia.  No dysuria.  No hematuria.    ROS:  Riky Salcedo denies headache, blurred vision, fever, nausea, vomiting, chills, flank discomfort, abdominal pain, bleeding per rectum, cough, SOB, recent loss of consciousness, recent mental status changes, seizures, dizziness, or upper or lower extremity weakness.    Past Medical History    Past Medical History:   Diagnosis Date    Fever blister     HSV-1 (herpes simplex virus 1) infection     Hyperlipidemia     Hypertension     Keloid cicatrix     Low serum testosterone level     Obese     Peyronie's disease        Past Surgical History    Past Surgical History:   Procedure Laterality Date    HERNIA REPAIR      UMBILICAL HERNIA REPAIR         Social History    Social History     Socioeconomic History    Marital status:      Spouse name: Not on file    Number of children: Not on file    Years of education: Not on file    Highest education level: Not on file   Social Needs    Financial resource strain: Not on file    Food insecurity - worry: Not on file    Food insecurity - inability: Not on file    Transportation needs - medical: Not on file    Transportation needs - non-medical: Not on file   Occupational History    Occupation: Retired     Employer: [employer]   Tobacco Use    Smoking status: Former Smoker     Types: Cigars    Smokeless tobacco: Former User   Substance and Sexual Activity    Alcohol use: Yes     Alcohol/week: 2.5 oz     Types: 5 Standard drinks or equivalent per week     Comment: 4-5 beers a day    Drug use: No    Sexual activity: Yes     Partners: Female   Other Topics Concern    Not on file   Social History Narrative    Not on file       Family History  Family History    Problem Relation Age of Onset    Heart disease Mother     Heart disease Father     Hypertension Father     Heart disease Paternal Grandmother     Hypertension Paternal Grandmother     Colon cancer Neg Hx     Crohn's disease Neg Hx     Ulcerative colitis Neg Hx     Melanoma Neg Hx     Prostate cancer Neg Hx     Kidney disease Neg Hx          Medications    Current Outpatient Medications:     aspirin (ECOTRIN) 81 MG EC tablet, Take 1 tablet (81 mg total) by mouth once daily., Disp: 30 tablet, Rfl: 11    lisinopril (PRINIVIL,ZESTRIL) 20 MG tablet, Take 1 tablet (20 mg total) by mouth once daily., Disp: 90 tablet, Rfl: 3    multivitamin (ONE DAILY MULTIVITAMIN) per tablet, Take 1 tablet by mouth once daily., Disp: , Rfl:     valACYclovir (VALTREX) 1000 MG tablet, Take 1,000 mg by mouth 2 (two) times daily., Disp: , Rfl:     Allergies  Review of patient's allergies indicates:  No Known Allergies      ?  PHYSICAL EXAM:    The patient generally appears in good health, is appropriately interactive, and is in no apparent distress.     Eyes: anicteric sclerae, moist conjunctivae; no lid-lag; PERRLA     HENT: Atraumatic; oropharynx clear with moist mucous membranes and no mucosal ulcerations;normal hard and soft palate.  No evidence of lymphadenopathy.    Neck: Trachea midline.  No thyromegaly.    Musculoskeletal: No abnormal gait.    Skin: No lesions.    Mental: Cooperative with normal affect.  Is oriented to time, place, and person.    Neuro: Grossly intact.    Chest: Normal inspiratory effort.   No accessory muscles.  No audible wheezes.  Respirations symmetric on inspiration and expiration.    Heart: Regular rhythm.      Abdomen:  Soft, non-tender. No masses or organomegaly. Bladder is not palpable. No evidence of flank discomfort. No evidence of inguinal hernia.    Genitourinary: The penis has no evidence of plaques or induration. The urethral meatus is normal. The testes, epididymides, and cord  structures are normal in size and contour bilaterally. The scrotum is normal in size and contour.    Extremities: No clubbing, cyanosis, or edema          A/P:  Riky Salcedo is a 68 y.o. male who presents for evaluation re vasectomy.    1.I discussed that his wife is 51 years old.  The chances of a successful pregnancy in her is extremely low.  I would recommend that she see her ob/gyn, and if the risk of pregnancy is deemed likely, I can do a vasectomy.  However, I would not recommend it at this time.  2. rtc prn.  3. I spent 15 minutes with the patient of which more than half was spent in direct consultation with the patient in regards to our treatment and plan.

## 2019-04-04 ENCOUNTER — LAB VISIT (OUTPATIENT)
Dept: LAB | Facility: HOSPITAL | Age: 69
End: 2019-04-04
Attending: FAMILY MEDICINE
Payer: COMMERCIAL

## 2019-04-04 DIAGNOSIS — I10 ESSENTIAL HYPERTENSION: ICD-10-CM

## 2019-04-04 DIAGNOSIS — E78.00 HYPERCHOLESTEROLEMIA: ICD-10-CM

## 2019-04-04 DIAGNOSIS — E78.00 HYPERCHOLESTEREMIA: ICD-10-CM

## 2019-04-04 LAB
ALT SERPL W/O P-5'-P-CCNC: 23 U/L (ref 10–44)
ANION GAP SERPL CALC-SCNC: 6 MMOL/L (ref 8–16)
AST SERPL-CCNC: 22 U/L (ref 10–40)
BUN SERPL-MCNC: 17 MG/DL (ref 8–23)
CALCIUM SERPL-MCNC: 9.2 MG/DL (ref 8.7–10.5)
CHLORIDE SERPL-SCNC: 106 MMOL/L (ref 95–110)
CHOLEST SERPL-MCNC: 200 MG/DL (ref 120–199)
CHOLEST/HDLC SERPL: 4.8 {RATIO} (ref 2–5)
CO2 SERPL-SCNC: 27 MMOL/L (ref 23–29)
CREAT SERPL-MCNC: 1 MG/DL (ref 0.5–1.4)
EST. GFR  (AFRICAN AMERICAN): >60 ML/MIN/1.73 M^2
EST. GFR  (NON AFRICAN AMERICAN): >60 ML/MIN/1.73 M^2
GLUCOSE SERPL-MCNC: 107 MG/DL (ref 70–110)
HDLC SERPL-MCNC: 42 MG/DL (ref 40–75)
HDLC SERPL: 21 % (ref 20–50)
LDLC SERPL CALC-MCNC: 139 MG/DL (ref 63–159)
NONHDLC SERPL-MCNC: 158 MG/DL
POTASSIUM SERPL-SCNC: 4.9 MMOL/L (ref 3.5–5.1)
SODIUM SERPL-SCNC: 139 MMOL/L (ref 136–145)
TRIGL SERPL-MCNC: 95 MG/DL (ref 30–150)

## 2019-04-04 PROCEDURE — 36415 COLL VENOUS BLD VENIPUNCTURE: CPT | Mod: PO

## 2019-04-04 PROCEDURE — 80048 BASIC METABOLIC PNL TOTAL CA: CPT

## 2019-04-04 PROCEDURE — 84460 ALANINE AMINO (ALT) (SGPT): CPT

## 2019-04-04 PROCEDURE — 80061 LIPID PANEL: CPT

## 2019-04-04 PROCEDURE — 84450 TRANSFERASE (AST) (SGOT): CPT

## 2019-04-11 ENCOUNTER — OFFICE VISIT (OUTPATIENT)
Dept: CARDIOLOGY | Facility: CLINIC | Age: 69
End: 2019-04-11
Payer: COMMERCIAL

## 2019-04-11 VITALS
WEIGHT: 237 LBS | HEIGHT: 72 IN | SYSTOLIC BLOOD PRESSURE: 134 MMHG | BODY MASS INDEX: 32.1 KG/M2 | DIASTOLIC BLOOD PRESSURE: 70 MMHG | HEART RATE: 88 BPM

## 2019-04-11 DIAGNOSIS — E78.00 HYPERCHOLESTEREMIA: Primary | ICD-10-CM

## 2019-04-11 DIAGNOSIS — E66.09 CLASS 1 OBESITY DUE TO EXCESS CALORIES WITHOUT SERIOUS COMORBIDITY WITH BODY MASS INDEX (BMI) OF 32.0 TO 32.9 IN ADULT: ICD-10-CM

## 2019-04-11 DIAGNOSIS — F10.19 ALCOHOL ABUSE WITH ALCOHOL-INDUCED DISORDER: Chronic | ICD-10-CM

## 2019-04-11 DIAGNOSIS — I10 ESSENTIAL HYPERTENSION: ICD-10-CM

## 2019-04-11 DIAGNOSIS — Z82.49 FAMILY HISTORY OF PREMATURE CORONARY HEART DISEASE: ICD-10-CM

## 2019-04-11 PROCEDURE — 1101F PR PT FALLS ASSESS DOC 0-1 FALLS W/OUT INJ PAST YR: ICD-10-PCS | Mod: CPTII,S$GLB,, | Performed by: NURSE PRACTITIONER

## 2019-04-11 PROCEDURE — 3075F PR MOST RECENT SYSTOLIC BLOOD PRESS GE 130-139MM HG: ICD-10-PCS | Mod: CPTII,S$GLB,, | Performed by: NURSE PRACTITIONER

## 2019-04-11 PROCEDURE — 93000 ELECTROCARDIOGRAM COMPLETE: CPT | Mod: S$GLB,,, | Performed by: INTERNAL MEDICINE

## 2019-04-11 PROCEDURE — 3078F PR MOST RECENT DIASTOLIC BLOOD PRESSURE < 80 MM HG: ICD-10-PCS | Mod: CPTII,S$GLB,, | Performed by: NURSE PRACTITIONER

## 2019-04-11 PROCEDURE — 3075F SYST BP GE 130 - 139MM HG: CPT | Mod: CPTII,S$GLB,, | Performed by: NURSE PRACTITIONER

## 2019-04-11 PROCEDURE — 99999 PR PBB SHADOW E&M-EST. PATIENT-LVL III: ICD-10-PCS | Mod: PBBFAC,,, | Performed by: NURSE PRACTITIONER

## 2019-04-11 PROCEDURE — 99999 PR PBB SHADOW E&M-EST. PATIENT-LVL III: CPT | Mod: PBBFAC,,, | Performed by: NURSE PRACTITIONER

## 2019-04-11 PROCEDURE — 99214 PR OFFICE/OUTPT VISIT, EST, LEVL IV, 30-39 MIN: ICD-10-PCS | Mod: S$GLB,,, | Performed by: NURSE PRACTITIONER

## 2019-04-11 PROCEDURE — 99214 OFFICE O/P EST MOD 30 MIN: CPT | Mod: S$GLB,,, | Performed by: NURSE PRACTITIONER

## 2019-04-11 PROCEDURE — 93000 EKG 12-LEAD: ICD-10-PCS | Mod: S$GLB,,, | Performed by: INTERNAL MEDICINE

## 2019-04-11 PROCEDURE — 3078F DIAST BP <80 MM HG: CPT | Mod: CPTII,S$GLB,, | Performed by: NURSE PRACTITIONER

## 2019-04-11 PROCEDURE — 1101F PT FALLS ASSESS-DOCD LE1/YR: CPT | Mod: CPTII,S$GLB,, | Performed by: NURSE PRACTITIONER

## 2019-04-11 NOTE — PATIENT INSTRUCTIONS
Increase cardiovascular exercise to 30 minutes of brisk walking a day for 4-5 days a week.  You can use a stationary bike or swim for 30 minutes a day instead of walking.  Whatever exercise you choose, make sure you are working hard enough to increase your heart rate.     Recommend decreasing alcohol consumption to 2 drinks a day.     LDL - bad type - improves with diet and medications: typically statins; most other medications that lower LDL have not been proven to prevent heart attacks.  May not improve significantly with exercise alone.  Ideally less than 100 mg/dl. If you have coronary artery disease, this should be well below 70 mg/dl.    HDL - good type - improves with exercise.  Ideally greater than 50 mg/dl.    TGs (triglycerides) - also bad - can change very quickly and considerably with certain foods. Improve with diet and exercise  In some cases a low carbohydrate diet will lower TGs better than a low fat diet.  Ideal range  mg/dl.    Sugar, fat and cholesterol in food:     A sensible diet that limits the intake of sugars, saturated (bad) fats and trans fats while increasing the intake of unsaturated (good)  fats and plant proteins is the basis of the current dietary recommendations.      We now recommend drastically reducing the intake of sugar. There is less emphasis on excluding fat.     Cholesterol in our food is no longer a significant concern, mainly because it is generally present in small amounts. However please do not confuse this with the role of cholesterol in our blood and arteries. Make no mistake, it is cholesterol that clogs up our arteries whether it comes from our food or is manufactured by our bodies.       Most foods that are high in cholesterol are also high in saturated fat. But there is way more saturated fat than cholesterol in these foods. So its the saturated fat content that matters more than cholesterol. On the other hand there are a handful of foods that are high in  cholesterol but do not contain much saturated fat: eggs, shrimp, crab legs and crawfish are OK to eat.       Saturated fat is the bad fat - you should limit your intake of this. Deep fried foods, meats and other animal fats are high in saturated fat. Cookies, donuts and most dessert and cakes are usually high in both saturated fat and sugar.       Unsaturated fat is the good fat. It contains the same number of calories as saturated fat but these fats do not get deposited in our arteries. The Mediterranean style diet encourages the intake of unsaturated fat - olive oil, avocado and unsalted nuts. So instead of baking a piece of fish, it may be better to pan-gan it using olive oil.     You should eat a few servings of vegetables (and fruit as long as you are not diabetic) everyday. Substitute some plant proteins in place of meat: beans, lentils, quinoa and oatmeal. They are lean proteins.     Do not use stick butter or stick margarine. Butter that comes in a tub is soft butter. It consists of 1/2 butter and 1/2 vegetable oil., either canola or olive oil It is fine to use that.       Trans fats should definitely be avoided. Most foods that are labelled as containing 0 gms of trans fat can still contain several hundred milligrams of trans fat: creamer, margarine, refrigerator dough, deep fried foods, ready made frosting, potato, corn and torilla chips, cakes, cookies, pie crusts and crackers containing shortening made with hydrogenated vegetable oil.

## 2019-04-11 NOTE — PROGRESS NOTES
Mr. Salcedo is a patient of Dr. Tompkins and was last seen in [facility] Cardiology Visit 2/16/18      Subjective:   Patient ID:  Riky Salcedo is a 69 y.o. male who presents for follow-up of Hypertension and Hyperlipidemia    Problem List:  HTN  HLD  CACS 0 in 2014  Family history of MI (parents) in their 60s    HPI:  Mr. Salcedo is routine follow up.  Patient denies chest pain with exertion or at rest, palpitations, SOB, GLEASON, dizziness, syncope, edema, orthopnea, PND, or claudication.  Reports routine exercise.  He is still cutting his own grass and weed eating.  He is a heavy drinker, 25 beers a week.      Review of Systems   Constitution: Negative for decreased appetite, diaphoresis, malaise/fatigue, weight gain and weight loss.   Eyes: Negative for visual disturbance.   Cardiovascular: Negative for chest pain, claudication, dyspnea on exertion, irregular heartbeat, leg swelling, near-syncope, orthopnea, palpitations, paroxysmal nocturnal dyspnea and syncope.        Denies chest pressure   Respiratory: Negative for cough, hemoptysis, shortness of breath, sleep disturbances due to breathing and snoring.    Endocrine: Negative for cold intolerance and heat intolerance.   Hematologic/Lymphatic: Negative for bleeding problem. Does not bruise/bleed easily.   Musculoskeletal: Negative for myalgias.   Gastrointestinal: Negative for bloating, abdominal pain, anorexia, change in bowel habit, constipation, diarrhea, nausea and vomiting.   Neurological: Negative for difficulty with concentration, disturbances in coordination, excessive daytime sleepiness, dizziness, headaches, light-headedness, loss of balance, numbness and weakness.   Psychiatric/Behavioral: The patient does not have insomnia.        Allergies and current medications updated and reviewed:  Review of patient's allergies indicates:  No Known Allergies  Current Outpatient Medications   Medication Sig    lisinopril (PRINIVIL,ZESTRIL) 20 MG tablet Take 1 tablet  (20 mg total) by mouth once daily.    valACYclovir (VALTREX) 1000 MG tablet Take 1,000 mg by mouth as needed.      No current facility-administered medications for this visit.      Objective:        /70   Pulse 88   Ht 6' (1.829 m)   Wt 107.5 kg (236 lb 15.9 oz)   BMI 32.14 kg/m²       Physical Exam   Constitutional: He is oriented to person, place, and time. Vital signs are normal. He appears well-developed and well-nourished. He is active. No distress.   HENT:   Head: Normocephalic and atraumatic.   Eyes: Conjunctivae and lids are normal. No scleral icterus.   Neck: Neck supple. Normal carotid pulses, no hepatojugular reflux and no JVD present. Carotid bruit is not present.   Cardiovascular: Normal rate, regular rhythm, S1 normal, S2 normal and intact distal pulses. PMI is not displaced. Exam reveals no gallop and no friction rub.   No murmur heard.  Pulses:       Carotid pulses are 2+ on the right side, and 2+ on the left side.       Radial pulses are 2+ on the right side, and 2+ on the left side.        Dorsalis pedis pulses are 2+ on the right side, and 2+ on the left side.        Posterior tibial pulses are 1+ on the right side, and 1+ on the left side.   Pulmonary/Chest: Effort normal and breath sounds normal. No respiratory distress. He has no decreased breath sounds. He has no wheezes. He has no rhonchi. He has no rales. He exhibits no tenderness.   Abdominal: Soft. Normal appearance and bowel sounds are normal. He exhibits no distension, no fluid wave, no abdominal bruit, no ascites and no pulsatile midline mass. There is no hepatosplenomegaly. There is no tenderness.   Musculoskeletal: He exhibits no edema.   Neurological: He is alert and oriented to person, place, and time. Gait normal.   Skin: Skin is warm, dry and intact. No rash noted. He is not diaphoretic. Nails show no clubbing.   Psychiatric: He has a normal mood and affect. His speech is normal and behavior is normal. Judgment and  thought content normal. Cognition and memory are normal.   Nursing note and vitals reviewed.      Chemistry        Component Value Date/Time     04/04/2019 0842    K 4.9 04/04/2019 0842     04/04/2019 0842    CO2 27 04/04/2019 0842    BUN 17 04/04/2019 0842    CREATININE 1.0 04/04/2019 0842     04/04/2019 0842        Component Value Date/Time    CALCIUM 9.2 04/04/2019 0842    ALKPHOS 83 03/10/2018 0830    AST 22 04/04/2019 0842    ALT 23 04/04/2019 0842    BILITOT 0.4 03/10/2018 0830    ESTGFRAFRICA >60.0 04/04/2019 0842    EGFRNONAA >60.0 04/04/2019 0842        Lab Results   Component Value Date    HGBA1C 6.0 03/24/2015     Recent Labs   Lab 03/10/18  0830 04/04/19  0842   WHITE BLOOD CELL COUNT 6.73  --    HEMOGLOBIN 15.0  --    HEMATOCRIT 44.9  --    MCV 90  --    PLATELETS 263  --    TSH 1.831  --    CHOLESTEROL  --  200 H   HDL  --  42   LDL CHOLESTEROL  --  139.0   TRIGLYCERIDES  --  95   HDL/CHOLESTEROL RATIO  --  21.0              Test(s) Reviewed  I have reviewed the following in detail:  [] Stress test   [] Angiography   [x] Echocardiogram   [x] Labs   [] Other:         Assessment/Plan:   1. Hypercholesteremia  LDL and ASCVD score elevated.  He has significant family h/o heart disease but he is adamant that he does not want to start a statin.  Last CACS was in 2014 and was 0.  Repeat CACS ordered.  Encouraged mediterranean diet, wt loss, and exercise.     - IN OFFICE EKG 12-LEAD (to Muse); Future  - CT Cardiac Scoring; Future  - IN OFFICE EKG 12-LEAD (to Muse)    2. Essential hypertension  BP at goal <130/80. Continue current regimen.    - IN OFFICE EKG 12-LEAD (to Muse); Future  - IN OFFICE EKG 12-LEAD (to Muse)    3. Alcohol abuse with alcohol-induced disorder  Drinking 25 beers a week.  Discussed decreasing his ETOH to no more than 2 drinks a day.    4. Class 1 obesity due to excess calories without serious comorbidity with body mass index (BMI) of 32.0 to 32.9 in adult  BMI 32.1  Encouraged increased CV exercise to 30 minutes a day for 5 days a week.       5. Family history of premature coronary heart disease    - CT Cardiac Scoring; Future      Patient was discussed with but not examined by Dr. Tompkins    Follow up in about 1 year (around 4/11/2020).

## 2019-04-18 DIAGNOSIS — I10 ESSENTIAL HYPERTENSION: ICD-10-CM

## 2019-04-18 RX ORDER — LISINOPRIL 20 MG/1
20 TABLET ORAL DAILY
Qty: 90 TABLET | Refills: 3 | Status: SHIPPED | OUTPATIENT
Start: 2019-04-18 | End: 2019-10-17

## 2019-04-19 DIAGNOSIS — L28.0 LSC (LICHEN SIMPLEX CHRONICUS): ICD-10-CM

## 2019-04-19 DIAGNOSIS — L29.1 PRURITUS SCROTI: ICD-10-CM

## 2019-04-24 DIAGNOSIS — L29.1 PRURITUS SCROTI: ICD-10-CM

## 2019-04-24 DIAGNOSIS — L28.0 LSC (LICHEN SIMPLEX CHRONICUS): ICD-10-CM

## 2019-04-24 RX ORDER — DESONIDE 0.5 MG/G
OINTMENT TOPICAL
Qty: 60 G | Refills: 0 | Status: SHIPPED | OUTPATIENT
Start: 2019-04-24 | End: 2020-11-17 | Stop reason: SDUPTHER

## 2019-04-24 RX ORDER — DESONIDE 0.5 MG/G
OINTMENT TOPICAL
Qty: 60 G | Refills: 0 | OUTPATIENT
Start: 2019-04-24

## 2019-04-29 ENCOUNTER — TELEPHONE (OUTPATIENT)
Dept: FAMILY MEDICINE | Facility: CLINIC | Age: 69
End: 2019-04-29

## 2019-04-29 NOTE — TELEPHONE ENCOUNTER
----- Message from Maria Del Carmen Hauser sent at 4/29/2019 12:20 PM CDT -----  Contact: Self  Pt is calling to be scheduled for his annual well visit; however, pt does not want to schedule with Dr. De La Vega because his appt calendar does not show an opening until 6/10/19.   attempted to make an appt at [facility] and with another provider at [facility], but received an error message because Dr. De La Vega has a Recall on the pt.  Pt is requesting Staff release the Recall so he can be scheduled to see another provider.    He can be reached at [phone].    Thank you.

## 2019-06-26 ENCOUNTER — OFFICE VISIT (OUTPATIENT)
Dept: SURGERY | Facility: CLINIC | Age: 69
End: 2019-06-26
Payer: COMMERCIAL

## 2019-06-26 VITALS
WEIGHT: 237 LBS | SYSTOLIC BLOOD PRESSURE: 133 MMHG | HEIGHT: 72 IN | BODY MASS INDEX: 32.1 KG/M2 | HEART RATE: 66 BPM | DIASTOLIC BLOOD PRESSURE: 77 MMHG

## 2019-06-26 DIAGNOSIS — R15.1 FECAL SMEARING: Primary | ICD-10-CM

## 2019-06-26 PROCEDURE — 99999 PR PBB SHADOW E&M-EST. PATIENT-LVL III: ICD-10-PCS | Mod: PBBFAC,,, | Performed by: NURSE PRACTITIONER

## 2019-06-26 PROCEDURE — 3078F DIAST BP <80 MM HG: CPT | Mod: CPTII,S$GLB,, | Performed by: NURSE PRACTITIONER

## 2019-06-26 PROCEDURE — 1101F PR PT FALLS ASSESS DOC 0-1 FALLS W/OUT INJ PAST YR: ICD-10-PCS | Mod: CPTII,S$GLB,, | Performed by: NURSE PRACTITIONER

## 2019-06-26 PROCEDURE — 1101F PT FALLS ASSESS-DOCD LE1/YR: CPT | Mod: CPTII,S$GLB,, | Performed by: NURSE PRACTITIONER

## 2019-06-26 PROCEDURE — 3078F PR MOST RECENT DIASTOLIC BLOOD PRESSURE < 80 MM HG: ICD-10-PCS | Mod: CPTII,S$GLB,, | Performed by: NURSE PRACTITIONER

## 2019-06-26 PROCEDURE — 3075F PR MOST RECENT SYSTOLIC BLOOD PRESS GE 130-139MM HG: ICD-10-PCS | Mod: CPTII,S$GLB,, | Performed by: NURSE PRACTITIONER

## 2019-06-26 PROCEDURE — 99204 PR OFFICE/OUTPT VISIT, NEW, LEVL IV, 45-59 MIN: ICD-10-PCS | Mod: S$GLB,,, | Performed by: NURSE PRACTITIONER

## 2019-06-26 PROCEDURE — 3075F SYST BP GE 130 - 139MM HG: CPT | Mod: CPTII,S$GLB,, | Performed by: NURSE PRACTITIONER

## 2019-06-26 PROCEDURE — 99999 PR PBB SHADOW E&M-EST. PATIENT-LVL III: CPT | Mod: PBBFAC,,, | Performed by: NURSE PRACTITIONER

## 2019-06-26 PROCEDURE — 99204 OFFICE O/P NEW MOD 45 MIN: CPT | Mod: S$GLB,,, | Performed by: NURSE PRACTITIONER

## 2019-06-26 RX ORDER — BROMPHENIRAMINE MALEATE, DEXTROMETHORPHAN HBR, PHENYLEPHRINE HCL, DIPHENHYDRAMINE HCL, PHENYLEPHRINE HCL 0.52G
0.52 KIT ORAL DAILY
Qty: 30 CAPSULE | Refills: 1 | Status: SHIPPED | OUTPATIENT
Start: 2019-06-26 | End: 2019-08-25

## 2019-06-26 NOTE — PROGRESS NOTES
Subjective:       Patient ID: Riky Salcedo is a 69 y.o. male.    Chief Complaint: No chief complaint on file.    HPI   69 M who presents to clinic for fecal seepage. He has a daily soft pasty BM, has to wipe excessively after BMs. He sees a smear of fecal material in his underwear frequently.   He denies any rectal pain, blood in stool, change in bowel habits. No full fecal incontinence or urinary incontinence.     Colonoscopy 2016 > due Aug 2021  Impression: One 4 mm polyp at the hepatic flexure. Resected                         and retrieved. > Tub Adenoma.         Review of Systems   Constitutional: Negative for appetite change, chills, fatigue, fever and unexpected weight change.   Respiratory: Negative for shortness of breath.    Cardiovascular: Negative for chest pain.   Gastrointestinal: Negative for abdominal distention, abdominal pain, anal bleeding, blood in stool, constipation, diarrhea, nausea, rectal pain and vomiting.       Objective:      Physical Exam   Constitutional: He is oriented to person, place, and time. He appears well-developed and well-nourished.   Eyes: Conjunctivae and EOM are normal.   Pulmonary/Chest: Effort normal. No respiratory distress.   Abdominal: Soft. He exhibits no distension. There is no tenderness.   Genitourinary: Rectal exam shows no mass and no tenderness.   Genitourinary Comments: Small anterior skin tag  Normal perianal skin. eversion of anus revealed no abnormality or fissure, PEGGY revealed no masses, blood or stool in vault, normal sphincter tone, anoscopy revealed normal internal hemorrhoids with no bleeding or stigmata of same.    Good tone, good squeeze, poor relaxation with valsalva    Musculoskeletal: Normal range of motion.   Neurological: He is alert and oriented to person, place, and time.   Skin: Skin is warm and dry.   Psychiatric: He has a normal mood and affect. His behavior is normal.       Assessment:       1. Fecal smearing        Plan:       Fiber  supplement daily  High fiber diet    RTC 2 months

## 2019-08-09 ENCOUNTER — PATIENT OUTREACH (OUTPATIENT)
Dept: ADMINISTRATIVE | Facility: HOSPITAL | Age: 69
End: 2019-08-09

## 2019-08-19 NOTE — PROGRESS NOTES
CRS Office Visit Follow-up  Referring Md:   Aaareferral Self  No address on file    SUBJECTIVE:     Chief Complaint:  Fecal incontinence    History of Present Illness:  Patient is a 69 y.o. male presents with fecal incontinence. The patient is a established patient to this practice.     Course is as follows:  6/26/19: seen for for fecal seepage. He has a daily soft pasty BM, has to wipe excessively after BMs. He sees a smear of fecal material in his underwear frequently.   He denies any rectal pain, blood in stool, change in bowel habits. No full fecal incontinence or urinary incontinence.     8/20/19:  Presents today for evaluation for a a small perianal skin tag as well as ongoing fecal seepage.  He has not taken any FiberCon secondary to concern for the pill getting stuck in his esophagus and causing an impaction.  He has 1 bowel movement a day that is quite soft.  He denies any bleeding during his bowel movements.  No protrusion.  No pain. After bowel movements, he will frequently take multiple wipes to get clean.  His bowel movements are often predominantly liquid.    In the past, he has been predominantly constipated and struggle with hemorrhoids and fissures.  This is improved with drinking more water.    Colonoscopy 2016 > due Aug 2021  Impression: One 4 mm polyp at the hepatic flexure. Resected                         and retrieved. > Tub Adenoma.     Review of Systems:  Review of Systems   Constitutional: Negative for chills, diaphoresis, fever, malaise/fatigue and weight loss.   HENT: Negative for congestion.    Respiratory: Negative for shortness of breath.    Cardiovascular: Negative for chest pain and leg swelling.   Gastrointestinal: Negative for abdominal pain, blood in stool, constipation, nausea and vomiting.   Genitourinary: Negative for dysuria.   Musculoskeletal: Negative for back pain and myalgias.   Skin: Negative for rash.   Neurological: Negative for dizziness and weakness.    Endo/Heme/Allergies: Does not bruise/bleed easily.   Psychiatric/Behavioral: Negative for depression.       OBJECTIVE:     Vital Signs (Most Recent)  BP (!) 152/79 (BP Location: Right arm, Patient Position: Sitting, BP Method: Large (Automatic))   Pulse 102   Ht 6' (1.829 m)   Wt 105.8 kg (233 lb 4 oz)   BMI 31.63 kg/m²     Physical Exam:  General: White male in no distress   Neuro: alert and oriented x 4.  Moves all extremities.     HEENT: no icterus.  Trachea midline  Respiratory: respirations are even and unlabored  Cardiac: regular rate  Abdomen:  Soft, nontender, no masses  Extremities: Warm dry and intact  Skin: no rashes  Anorectal:  External exam demonstrates a small skin tag in the posterior midline. Digital exam was performed. Muscle is intact. Good squeeze pressure. No masses palpated. Anoscopy was then performed.  Small grade 1 internal hemorrhoids seen without any evidence of prolapse or bleeding.    Labs:  None    Imaging:  None      ASSESSMENT/PLAN:     Riky was seen today for fecal incontinence.    Diagnoses and all orders for this visit:    Fecal smearing        69-year-old gentleman with intermittent fecal seepage.  This is predominantly related to the liquid consistency of his bowel movements.  I asked him to bulk his bowel movements by taking Metamucil.  If that did not provide adequate bulk, he could then start taking liquid Imodium intermittently.  Written instructions were given to him today.  He will follow up with me as needed.    JOSEPH Engel MD  Staff Surgeon  Colon & Rectal Surgery

## 2019-08-20 ENCOUNTER — OFFICE VISIT (OUTPATIENT)
Dept: SURGERY | Facility: CLINIC | Age: 69
End: 2019-08-20
Payer: COMMERCIAL

## 2019-08-20 VITALS
HEIGHT: 72 IN | DIASTOLIC BLOOD PRESSURE: 79 MMHG | WEIGHT: 233.25 LBS | HEART RATE: 102 BPM | SYSTOLIC BLOOD PRESSURE: 152 MMHG | BODY MASS INDEX: 31.59 KG/M2

## 2019-08-20 DIAGNOSIS — R15.1 FECAL SMEARING: Primary | ICD-10-CM

## 2019-08-20 PROCEDURE — 46600 PR DIAG2STIC A2SCOPY: ICD-10-PCS | Mod: S$GLB,,, | Performed by: COLON & RECTAL SURGERY

## 2019-08-20 PROCEDURE — 3077F PR MOST RECENT SYSTOLIC BLOOD PRESSURE >= 140 MM HG: ICD-10-PCS | Mod: CPTII,S$GLB,, | Performed by: COLON & RECTAL SURGERY

## 2019-08-20 PROCEDURE — 99999 PR PBB SHADOW E&M-EST. PATIENT-LVL III: ICD-10-PCS | Mod: PBBFAC,,, | Performed by: COLON & RECTAL SURGERY

## 2019-08-20 PROCEDURE — 99213 PR OFFICE/OUTPT VISIT, EST, LEVL III, 20-29 MIN: ICD-10-PCS | Mod: 25,S$GLB,, | Performed by: COLON & RECTAL SURGERY

## 2019-08-20 PROCEDURE — 3078F PR MOST RECENT DIASTOLIC BLOOD PRESSURE < 80 MM HG: ICD-10-PCS | Mod: CPTII,S$GLB,, | Performed by: COLON & RECTAL SURGERY

## 2019-08-20 PROCEDURE — 3077F SYST BP >= 140 MM HG: CPT | Mod: CPTII,S$GLB,, | Performed by: COLON & RECTAL SURGERY

## 2019-08-20 PROCEDURE — 3078F DIAST BP <80 MM HG: CPT | Mod: CPTII,S$GLB,, | Performed by: COLON & RECTAL SURGERY

## 2019-08-20 PROCEDURE — 1101F PT FALLS ASSESS-DOCD LE1/YR: CPT | Mod: CPTII,S$GLB,, | Performed by: COLON & RECTAL SURGERY

## 2019-08-20 PROCEDURE — 1101F PR PT FALLS ASSESS DOC 0-1 FALLS W/OUT INJ PAST YR: ICD-10-PCS | Mod: CPTII,S$GLB,, | Performed by: COLON & RECTAL SURGERY

## 2019-08-20 PROCEDURE — 46600 DIAGNOSTIC ANOSCOPY SPX: CPT | Mod: S$GLB,,, | Performed by: COLON & RECTAL SURGERY

## 2019-08-20 PROCEDURE — 99999 PR PBB SHADOW E&M-EST. PATIENT-LVL III: CPT | Mod: PBBFAC,,, | Performed by: COLON & RECTAL SURGERY

## 2019-08-20 PROCEDURE — 99213 OFFICE O/P EST LOW 20 MIN: CPT | Mod: 25,S$GLB,, | Performed by: COLON & RECTAL SURGERY

## 2019-08-27 DIAGNOSIS — B00.2 ORAL HERPES SIMPLEX INFECTION: ICD-10-CM

## 2019-08-27 RX ORDER — VALACYCLOVIR HYDROCHLORIDE 1 G/1
TABLET, FILM COATED ORAL
Qty: 6 TABLET | Refills: 6 | Status: SHIPPED | OUTPATIENT
Start: 2019-08-27 | End: 2022-10-05 | Stop reason: SDUPTHER

## 2019-08-28 ENCOUNTER — TELEPHONE (OUTPATIENT)
Dept: CARDIOLOGY | Facility: CLINIC | Age: 69
End: 2019-08-28

## 2019-08-28 NOTE — TELEPHONE ENCOUNTER
Returned patient's call but no answer. Left message on his voice mail asking him to call us and tell us what questions he has about his meds.    Stevenson Beatty Staff   Caller: Pt (Today, 11:52 AM)             Pt would like to be called back asap regarding questions concerning current blood pressure medication.     Pt can be reached at [phone].     Thans

## 2019-08-28 NOTE — TELEPHONE ENCOUNTER
----- Message from Stevenson Marcelo sent at 8/28/2019 11:52 AM CDT -----  Contact: Pt  Pt would like to be called back asap regarding questions concerning current blood pressure medication.    Pt can be reached at [phone].    Leilani

## 2019-10-17 ENCOUNTER — CLINICAL SUPPORT (OUTPATIENT)
Dept: CARDIOLOGY | Facility: CLINIC | Age: 69
End: 2019-10-17
Attending: NURSE PRACTITIONER
Payer: COMMERCIAL

## 2019-10-17 ENCOUNTER — PATIENT MESSAGE (OUTPATIENT)
Dept: CARDIOLOGY | Facility: CLINIC | Age: 69
End: 2019-10-17

## 2019-10-17 ENCOUNTER — OFFICE VISIT (OUTPATIENT)
Dept: CARDIOLOGY | Facility: CLINIC | Age: 69
End: 2019-10-17
Payer: COMMERCIAL

## 2019-10-17 VITALS
BODY MASS INDEX: 31.65 KG/M2 | DIASTOLIC BLOOD PRESSURE: 84 MMHG | HEART RATE: 80 BPM | HEIGHT: 72 IN | WEIGHT: 233.69 LBS | SYSTOLIC BLOOD PRESSURE: 144 MMHG

## 2019-10-17 DIAGNOSIS — E78.00 HYPERCHOLESTEREMIA: ICD-10-CM

## 2019-10-17 DIAGNOSIS — R07.89 CHEST DISCOMFORT: ICD-10-CM

## 2019-10-17 DIAGNOSIS — I10 ESSENTIAL HYPERTENSION: Primary | ICD-10-CM

## 2019-10-17 DIAGNOSIS — R00.2 PALPITATIONS: ICD-10-CM

## 2019-10-17 DIAGNOSIS — M79.602 LEFT ARM PAIN: ICD-10-CM

## 2019-10-17 DIAGNOSIS — E66.09 CLASS 1 OBESITY DUE TO EXCESS CALORIES WITHOUT SERIOUS COMORBIDITY WITH BODY MASS INDEX (BMI) OF 32.0 TO 32.9 IN ADULT: ICD-10-CM

## 2019-10-17 DIAGNOSIS — R29.818 SUSPECTED SLEEP APNEA: ICD-10-CM

## 2019-10-17 LAB
LEFT WRIST BRACHIAL INDEX: 1 WBI
RIGHT WRIST BRACHIAL INDEX: 1.14 WBI
UPPER ARTERIAL LEFT ARM AXILLARY SYS MAX: 155 CM/S
UPPER ARTERIAL LEFT ARM BRACHIAL SYS MAX: 141 CM/S
UPPER ARTERIAL LEFT ARM RADIAL SYS MAX: 66 CM/S
UPPER ARTERIAL LEFT ARM SUBCLAVIAN SYS MAX: 169 CM/S
UPPER ARTERIAL LEFT ARM ULNAR SYS MAX: 76 CM/S
UPPER ARTERIAL RIGHT ARM AXILLARY SYS MAX: 119 CM/S
UPPER ARTERIAL RIGHT ARM BRACHIAL SYS MAX: 145 CM/S
UPPER ARTERIAL RIGHT ARM RADIAL SYS MAX: 143 CM/S
UPPER ARTERIAL RIGHT ARM SUBCLAVIAN SYS MAX: 125 CM/S
UPPER ARTERIAL RIGHT ARM ULNAR SYS MAX: 59 CM/S

## 2019-10-17 PROCEDURE — 1101F PT FALLS ASSESS-DOCD LE1/YR: CPT | Mod: CPTII,S$GLB,, | Performed by: NURSE PRACTITIONER

## 2019-10-17 PROCEDURE — 3079F DIAST BP 80-89 MM HG: CPT | Mod: CPTII,S$GLB,, | Performed by: NURSE PRACTITIONER

## 2019-10-17 PROCEDURE — 99214 PR OFFICE/OUTPT VISIT, EST, LEVL IV, 30-39 MIN: ICD-10-PCS | Mod: S$GLB,,, | Performed by: NURSE PRACTITIONER

## 2019-10-17 PROCEDURE — 3079F PR MOST RECENT DIASTOLIC BLOOD PRESSURE 80-89 MM HG: ICD-10-PCS | Mod: CPTII,S$GLB,, | Performed by: NURSE PRACTITIONER

## 2019-10-17 PROCEDURE — 93930 UPPER EXTREMITY STUDY: CPT | Mod: S$GLB,,, | Performed by: INTERNAL MEDICINE

## 2019-10-17 PROCEDURE — 99999 PR PBB SHADOW E&M-EST. PATIENT-LVL III: CPT | Mod: PBBFAC,,, | Performed by: NURSE PRACTITIONER

## 2019-10-17 PROCEDURE — 93930 CV US DOPPLER ARTERIAL ARMS BILATERAL (CUPID ONLY): ICD-10-PCS | Mod: S$GLB,,, | Performed by: INTERNAL MEDICINE

## 2019-10-17 PROCEDURE — 99999 PR PBB SHADOW E&M-EST. PATIENT-LVL III: ICD-10-PCS | Mod: PBBFAC,,, | Performed by: NURSE PRACTITIONER

## 2019-10-17 PROCEDURE — 99214 OFFICE O/P EST MOD 30 MIN: CPT | Mod: S$GLB,,, | Performed by: NURSE PRACTITIONER

## 2019-10-17 PROCEDURE — 1101F PR PT FALLS ASSESS DOC 0-1 FALLS W/OUT INJ PAST YR: ICD-10-PCS | Mod: CPTII,S$GLB,, | Performed by: NURSE PRACTITIONER

## 2019-10-17 PROCEDURE — 3077F PR MOST RECENT SYSTOLIC BLOOD PRESSURE >= 140 MM HG: ICD-10-PCS | Mod: CPTII,S$GLB,, | Performed by: NURSE PRACTITIONER

## 2019-10-17 PROCEDURE — 3077F SYST BP >= 140 MM HG: CPT | Mod: CPTII,S$GLB,, | Performed by: NURSE PRACTITIONER

## 2019-10-17 RX ORDER — LISINOPRIL 40 MG/1
40 TABLET ORAL DAILY
Qty: 90 TABLET | Refills: 3 | Status: SHIPPED | OUTPATIENT
Start: 2019-10-17 | End: 2020-01-23 | Stop reason: DRUGHIGH

## 2019-10-17 NOTE — PROGRESS NOTES
"Mr. Salcedo is a patient of Dr. Tompkins and was last seen in [facility] Visit 4/11/19.      Subjective:   Patient ID:  Riky Salcedo is a 69 y.o. male who presents for follow-up of Chest Pain and Palpitations    Problem List:  HTN  HLD  CACS 0 in 2014  Family history of MI (parents) in their 60s    HPI:  Mr. Salcedo is routine follow up.  Reports a 2 week h/o left arm pain.  His daughter is an LPN and checked his bloo dpressures last night.  He had a large difference between his right and left arms.  His LUE /76 and /80.  He has recently purchased a new vehicle and has been driving more with his left arm.  He commutes 2 hours daily.  He has also had squeezing tight chest pain that comes on randomly at rest and with exertion.  The pain is located on his left precordium and lasts a few minutes.  Denies chest wall tenderness.   Reports waking with rapid palpitations for the last 2 or 3 years that last for minutes about once a month.  He has not checked his HR when the palpitations occur.  Denies snoring.  Wakes tired.  "I wake up as tired as I went to bed.  I have a lot on my mind."  He has not taken his lisinopril yet today.  Reports most of his blood pressures are in the 140s.     Review of Systems   Constitution: Negative for decreased appetite, diaphoresis, malaise/fatigue, weight gain and weight loss.   Eyes: Negative for visual disturbance.   Cardiovascular: Positive for chest pain and palpitations. Negative for claudication, dyspnea on exertion, irregular heartbeat, leg swelling, near-syncope, orthopnea, paroxysmal nocturnal dyspnea and syncope.        Denies chest pressure   Respiratory: Negative for cough, hemoptysis, shortness of breath, sleep disturbances due to breathing and snoring.    Endocrine: Negative for cold intolerance and heat intolerance.   Hematologic/Lymphatic: Negative for bleeding problem. Does not bruise/bleed easily.   Musculoskeletal: Negative for myalgias. "   Gastrointestinal: Negative for bloating, abdominal pain, anorexia, change in bowel habit, constipation, diarrhea, nausea and vomiting.   Neurological: Negative for difficulty with concentration, disturbances in coordination, excessive daytime sleepiness, dizziness, headaches, light-headedness, loss of balance, numbness and weakness.   Psychiatric/Behavioral: The patient does not have insomnia.        Allergies and current medications updated and reviewed:  Review of patient's allergies indicates:  No Known Allergies  Current Outpatient Medications   Medication Sig    desonide 0.05% (DESOWEN) 0.05 % Oint APPLY ON SCROTUM TWICE A DAY FOR 1 TO 2 WEEKS THEN AS NEEDED FOR FLARES    flu vacc [id number] 65yr up,PF, 180 mcg/0.5 mL Syrg Fluzone High-Dose [id number] (PF) 180 mcg/0.5 mL intramuscular syringe   ADM 0.5ML IM UTD    lisinopril (PRINIVIL,ZESTRIL) 20 MG tablet Take 1 tablet (20 mg total) by mouth once daily.    psyllium husk, with sugar, (METAMUCIL, WITH SUGAR,) 3.4 gram/12 gram Powd Take by mouth once daily.    valACYclovir (VALTREX) 1000 MG tablet TAKE 1 TABLET BY MOUTH TWICE DAILY     No current facility-administered medications for this visit.        Objective:        BP (!) 144/84   Pulse 80   Ht 6' (1.829 m)   Wt 106 kg (233 lb 11 oz)   BMI 31.69 kg/m²       Physical Exam   Constitutional: He is oriented to person, place, and time. Vital signs are normal. He appears well-developed and well-nourished. He is active. No distress.   HENT:   Head: Normocephalic and atraumatic.   Eyes: Conjunctivae and lids are normal. No scleral icterus.   Neck: Neck supple. Normal carotid pulses, no hepatojugular reflux and no JVD present. Carotid bruit is not present.   Cardiovascular: Normal rate, regular rhythm, S1 normal, S2 normal and intact distal pulses. PMI is not displaced. Exam reveals no gallop and no friction rub.   No murmur heard.  Pulses:       Carotid pulses are 2+ on the right side, and 2+ on the left  side.       Radial pulses are 2+ on the right side, and 2+ on the left side.        Dorsalis pedis pulses are 2+ on the right side, and 2+ on the left side.        Posterior tibial pulses are 1+ on the right side, and 1+ on the left side.   Pulmonary/Chest: Effort normal and breath sounds normal. No respiratory distress. He has no decreased breath sounds. He has no wheezes. He has no rhonchi. He has no rales. He exhibits no tenderness.   Abdominal: Soft. Normal appearance and bowel sounds are normal. He exhibits no distension, no fluid wave, no abdominal bruit, no ascites and no pulsatile midline mass. There is no hepatosplenomegaly. There is no tenderness.   Musculoskeletal: He exhibits no edema.   Neurological: He is alert and oriented to person, place, and time. Gait normal.   Skin: Skin is warm, dry and intact. No rash noted. He is not diaphoretic. Nails show no clubbing.   Psychiatric: He has a normal mood and affect. His speech is normal and behavior is normal. Judgment and thought content normal. Cognition and memory are normal.   Nursing note and vitals reviewed.      Chemistry        Component Value Date/Time     04/04/2019 0842    K 4.9 04/04/2019 0842     04/04/2019 0842    CO2 27 04/04/2019 0842    BUN 17 04/04/2019 0842    CREATININE 1.0 04/04/2019 0842     04/04/2019 0842        Component Value Date/Time    CALCIUM 9.2 04/04/2019 0842    ALKPHOS 83 03/10/2018 0830    AST 22 04/04/2019 0842    ALT 23 04/04/2019 0842    BILITOT 0.4 03/10/2018 0830    ESTGFRAFRICA >60.0 04/04/2019 0842    EGFRNONAA >60.0 04/04/2019 0842        Lab Results   Component Value Date    HGBA1C 6.0 03/24/2015     Recent Labs   Lab 03/10/18  0830 04/04/19  0842   WBC 6.73  --    Hemoglobin 15.0  --    Hematocrit 44.9  --    Mean Corpuscular Volume 90  --    Platelets 263  --    TSH 1.831  --    Cholesterol  --  200 H   HDL  --  42   LDL Cholesterol  --  139.0   Triglycerides  --  95   Hdl/Cholesterol Ratio  --   21.0              Test(s) Reviewed  I have reviewed the following in detail:  [] Stress test   [] Angiography   [x] Echocardiogram   [x] Labs   [] Other:         Assessment/Plan:   1. Essential hypertension  BP not at goal <130/80. Increase lisinopril to 40 mg daily and repeat his bmp in 2 weeks.     - IN OFFICE EKG 12-LEAD (to Muse); Future  - IN OFFICE EKG 12-LEAD (to Muse)    2. Hypercholesteremia  LDL not at goal <100.  Discussed statin therapy but he declines. Encouraged mediterranean diet, wt loss, and exercise.    - IN OFFICE EKG 12-LEAD (to Muse); Future  - IN OFFICE EKG 12-LEAD (to Muse)    3. Class 1 obesity due to excess calories without serious comorbidity with body mass index (BMI) of 32.0 to 32.9 in adult  BMI 31.7  Encouraged increased CV exercise to 30 minutes a day for 5 days a week.       4. Left arm pain  Reports left arm pain particularly after driving with his arm raised slightly.  He has bp down by daughter at home that revealed a 40 mmHg difference between his arms with the lower pressure on the left.  Manual BP done by this provider revealed only a 8 mmHG difference between extremities.  Will get arterial ultrasound to evaluate for subclavian stenosis.     - CV Ultrasound doppler arterial arms bilat; Future    5. Chest discomfort  He had a calcium score of 0 in 2014 and was to get his cacs repeated but has not done the test.  Instructed to re-schedule calcium score to determine risk.      6. Palpitations  He describes waking with a start and having tachycardia which is suspicious for sleep apnea.  Suspect he is occluding his airway and gasping for air which leads to his arousal and tachycardia.  He does not get these palpitations at other times.     - Ambulatory referral to Sleep Disorders    7. Suspected sleep apnea    - Ambulatory referral to Sleep Disorders    Patient was discussed with but not examined by Dr. Tompkins    Follow up in about 3 months (around 1/17/2020).

## 2019-10-23 ENCOUNTER — HOSPITAL ENCOUNTER (OUTPATIENT)
Dept: RADIOLOGY | Facility: HOSPITAL | Age: 69
Discharge: HOME | End: 2019-10-23
Attending: NURSE PRACTITIONER
Payer: COMMERCIAL

## 2019-10-23 DIAGNOSIS — E78.00 HYPERCHOLESTEREMIA: ICD-10-CM

## 2019-10-23 DIAGNOSIS — Z82.49 FAMILY HISTORY OF PREMATURE CORONARY HEART DISEASE: ICD-10-CM

## 2019-10-23 PROCEDURE — 75571 CT CALCIUM SCORING CARDIAC: ICD-10-PCS | Mod: 26,,, | Performed by: RADIOLOGY

## 2019-10-23 PROCEDURE — 75571 CT HRT W/O DYE W/CA TEST: CPT | Mod: TC

## 2019-10-23 PROCEDURE — 75571 CT HRT W/O DYE W/CA TEST: CPT | Mod: 26,,, | Performed by: RADIOLOGY

## 2019-10-25 ENCOUNTER — PATIENT MESSAGE (OUTPATIENT)
Dept: CARDIOLOGY | Facility: CLINIC | Age: 69
End: 2019-10-25

## 2019-10-28 ENCOUNTER — OFFICE VISIT (OUTPATIENT)
Dept: FAMILY MEDICINE | Facility: CLINIC | Age: 69
End: 2019-10-28
Payer: COMMERCIAL

## 2019-10-28 VITALS
OXYGEN SATURATION: 96 % | BODY MASS INDEX: 31.56 KG/M2 | DIASTOLIC BLOOD PRESSURE: 70 MMHG | HEIGHT: 72 IN | SYSTOLIC BLOOD PRESSURE: 110 MMHG | HEART RATE: 100 BPM | WEIGHT: 233 LBS

## 2019-10-28 DIAGNOSIS — I10 ESSENTIAL HYPERTENSION: ICD-10-CM

## 2019-10-28 DIAGNOSIS — E78.00 HYPERCHOLESTEREMIA: ICD-10-CM

## 2019-10-28 DIAGNOSIS — J84.10 LUNG GRANULOMA: ICD-10-CM

## 2019-10-28 DIAGNOSIS — Z00.00 ANNUAL PHYSICAL EXAM: Primary | ICD-10-CM

## 2019-10-28 DIAGNOSIS — Z83.3 FAMILY HISTORY OF DIABETES MELLITUS: ICD-10-CM

## 2019-10-28 DIAGNOSIS — H01.005 BLEPHARITIS OF LEFT LOWER EYELID, UNSPECIFIED TYPE: ICD-10-CM

## 2019-10-28 DIAGNOSIS — Z12.5 SCREENING FOR PROSTATE CANCER: ICD-10-CM

## 2019-10-28 PROCEDURE — 3074F SYST BP LT 130 MM HG: CPT | Mod: CPTII,S$GLB,, | Performed by: FAMILY MEDICINE

## 2019-10-28 PROCEDURE — 99999 PR PBB SHADOW E&M-EST. PATIENT-LVL IV: ICD-10-PCS | Mod: PBBFAC,,, | Performed by: FAMILY MEDICINE

## 2019-10-28 PROCEDURE — 1101F PR PT FALLS ASSESS DOC 0-1 FALLS W/OUT INJ PAST YR: ICD-10-PCS | Mod: CPTII,S$GLB,, | Performed by: FAMILY MEDICINE

## 2019-10-28 PROCEDURE — 99999 PR PBB SHADOW E&M-EST. PATIENT-LVL IV: CPT | Mod: PBBFAC,,, | Performed by: FAMILY MEDICINE

## 2019-10-28 PROCEDURE — 1101F PT FALLS ASSESS-DOCD LE1/YR: CPT | Mod: CPTII,S$GLB,, | Performed by: FAMILY MEDICINE

## 2019-10-28 PROCEDURE — 99397 PR PREVENTIVE VISIT,EST,65 & OVER: ICD-10-PCS | Mod: S$GLB,,, | Performed by: FAMILY MEDICINE

## 2019-10-28 PROCEDURE — 3074F PR MOST RECENT SYSTOLIC BLOOD PRESSURE < 130 MM HG: ICD-10-PCS | Mod: CPTII,S$GLB,, | Performed by: FAMILY MEDICINE

## 2019-10-28 PROCEDURE — 99397 PER PM REEVAL EST PAT 65+ YR: CPT | Mod: S$GLB,,, | Performed by: FAMILY MEDICINE

## 2019-10-28 PROCEDURE — 3078F DIAST BP <80 MM HG: CPT | Mod: CPTII,S$GLB,, | Performed by: FAMILY MEDICINE

## 2019-10-28 PROCEDURE — 3078F PR MOST RECENT DIASTOLIC BLOOD PRESSURE < 80 MM HG: ICD-10-PCS | Mod: CPTII,S$GLB,, | Performed by: FAMILY MEDICINE

## 2019-10-28 NOTE — PATIENT INSTRUCTIONS
Blepharitis    Blepharitis is an inflammation of the eyelid. It results in swelling of the eyelids, and it is usually caused by a bacterial infection or a skin condition. Blepharitis is a common eye condition. There are two types. Anterior blepharitis occurs where the eyelashes are attached (outside front edge of the eye). Posterior blepharitis affects the inner edge of the eyelid that touches the eyeball.  In addition to swollen eyelids, symptoms of blepharitis can include thick, yellow, dandruff-like scales that stick to the eyelid. There may be oily patches on the eyelid. The eyelashes may be crusted (with dandruff-like scales) when you wake up from sleeping. The irritated area may itch. The eyelids may be red. The eyes can be red and burn or sting. The eyes may tear a lot, or be dry. You can become sensitive to light or have blurred vision. Symptoms of blepharitis can cause irritability.  Blepharitis is a chronic condition and difficult to cure. Even with successful treatment, recurrences are common. Good hygiene and home treatments (in the Home care section below) can improve your condition.  Causes  Causes of blepharitis may include:  · Problems with the oil glands in the eyelid (meibomian glands)  · Dandruff of the scalp and eyebrows (seborrheic dermatitis)  · Acne rosacea (a skin condition that causes redness of the face, and other symptoms)  · Eyelash mites (tiny organisms in the eyelash follicles)  · Allergic reactions to cosmetics or medicines  Home care  Medicine: The healthcare provider may prescribe an antibiotic eye drops or ointment, artificial tears, and/or steroid eye drops. Follow all instructions for using these medicines. Use all medicines as directed. If you have pain, take medicine as advised by the healthcare provider.  · Wash your hands carefully with soap and warm water before and after caring for your eyes.  · Apply a warm compress or a warm, moist washcloth to the eyelids for 1 minute,  2 to 3 times a day, to loosen the crust. Then, wipe away scales or crust from the eyelids.  · After applying the warm compress, gently scrub the base of the eyelashes for almost 15 seconds per eyelid. To do this, close your eyes and use a moist eyelid cleansing wipe, clean washcloth, or cotton swab. Ask your healthcare provider about products (such as nonirritating baby shampoo) to use to help clean the eyelids.  · You may be instructed to gently massage your eyelids to help unblock the eyelid glands. Follow all instructions given by the healthcare provider.  · Unless told otherwise, on a regular basis, with eyes closed, clean your eyelids as directed by the healthcare provider. Blepharitis can be an ongoing problem.  · Do not wear eye makeup until the inflammation goes away, or as directed by your healthcare provider.  · Unless told otherwise, stop using contact lenses until you complete treatment for the condition.  · Wash your hands regularly to help prevent dirt and bacteria from coming in contact with your eyelid.  Follow-up care  Follow up with your healthcare provider, or as advised. Your healthcare provider may refer you to an eye specialist (an optometrist or ophthalmologist) for further evaluation and treatment.  When to seek medical advice  Call your healthcare provider right away if any of these occur:  · Increase in redness of the white part of the eye  · Increase in swelling, redness, irritation, or pain of the eyelids  · Eye pain  · Change in vision (trouble seeing or blurring)  · Drainage (pus, blood) from the eyelid  · Fever of 100.4ºF (38ºC) or higher, or as directed by your healthcare provider  Date Last Reviewed: 10/9/2015  © 7985-2045 [employer]. [address] [place] [address], [place] [address]. All rights reserved. This information is not intended as a substitute for professional medical care. Always follow your healthcare professional's instructions.

## 2019-10-28 NOTE — PROGRESS NOTES
Subjective:       Patient ID: Riky Salcedo is a 69 y.o. male.    Chief Complaint: Annual Exam    69 years old male came to the clinic for his physical examination.  Blood pressure today stable.  No chest pain, palpitation, orthopnea or PND.  Patient was on cholesterol medicine before.  Patient did not want to take statins right now.  Patient with a calcium score of 2 and incidental finding of a lung granuloma.  Patient reports history asbestos exposure .  Patient with left eye itching redness over eyelid.  Patient was using baby shampoo with significant improvement.    Review of Systems   Constitutional: Negative.    HENT: Negative.    Eyes: Negative.    Respiratory: Negative.    Cardiovascular: Negative.  Negative for chest pain, palpitations and leg swelling.   Gastrointestinal: Negative.    Endocrine: Negative for polydipsia, polyphagia and polyuria.   Genitourinary: Negative.    Musculoskeletal: Negative.    Skin: Negative.    Neurological: Negative.    Psychiatric/Behavioral: Negative.        Objective:      Physical Exam   Constitutional: He is oriented to person, place, and time. He appears well-developed and well-nourished. No distress.   HENT:   Head: Normocephalic and atraumatic.   Right Ear: External ear normal.   Left Ear: External ear normal.   Nose: Nose normal.   Mouth/Throat: Oropharynx is clear and moist. No oropharyngeal exudate.   Eyes: Pupils are equal, round, and reactive to light. Conjunctivae and EOM are normal. Right eye exhibits no discharge. Left eye exhibits no discharge. No scleral icterus.   Neck: Normal range of motion. Neck supple. No JVD present. No tracheal deviation present. No thyromegaly present.   Cardiovascular: Normal rate, regular rhythm, normal heart sounds and intact distal pulses. Exam reveals no gallop and no friction rub.   No murmur heard.  Pulmonary/Chest: Effort normal and breath sounds normal. No stridor. No respiratory distress. He has no wheezes. He has no  rales. He exhibits no tenderness.   Abdominal: Soft. Bowel sounds are normal. He exhibits no distension and no mass. There is no tenderness. There is no rebound and no guarding.   Musculoskeletal: Normal range of motion. He exhibits no edema or tenderness.   Lymphadenopathy:     He has no cervical adenopathy.   Neurological: He is alert and oriented to person, place, and time. He has normal reflexes. He displays normal reflexes. No cranial nerve deficit. He exhibits normal muscle tone. Coordination normal.   Skin: Skin is warm and dry. No rash noted. He is not diaphoretic. No erythema. No pallor.   Psychiatric: He has a normal mood and affect. His behavior is normal. Judgment and thought content normal.   Nursing note and vitals reviewed.      Assessment:       1. Annual physical exam    2. Essential hypertension    3. Hypercholesteremia    4. Lung granuloma    5. Screening for prostate cancer    6. Blepharitis of left lower eyelid, unspecified type        Plan:         Riky was seen today for annual exam.    Diagnoses and all orders for this visit:    Annual physical exam  -     CBC auto differential; Future  -     TSH; Future  -     Urinalysis; Future  -     PSA, Screening; Future    Essential hypertension  -     CBC auto differential; Future  -     TSH; Future  -     Urinalysis; Future    Hypercholesteremia  -     CBC auto differential; Future    Lung granuloma    Screening for prostate cancer  -     PSA, Screening; Future    Blepharitis of left lower eyelid, unspecified type    Family history of diabetes mellitus  -     Hemoglobin A1c; Future    Continue monitoring blood pressure at home, low sodium diet.  Lung granuloma observation for now.

## 2019-11-01 ENCOUNTER — LAB VISIT (OUTPATIENT)
Dept: LAB | Facility: HOSPITAL | Age: 69
End: 2019-11-01
Attending: FAMILY MEDICINE
Payer: COMMERCIAL

## 2019-11-01 DIAGNOSIS — Z12.5 SCREENING FOR PROSTATE CANCER: ICD-10-CM

## 2019-11-01 DIAGNOSIS — E78.00 HYPERCHOLESTEREMIA: ICD-10-CM

## 2019-11-01 DIAGNOSIS — I10 ESSENTIAL HYPERTENSION: ICD-10-CM

## 2019-11-01 DIAGNOSIS — Z83.3 FAMILY HISTORY OF DIABETES MELLITUS: ICD-10-CM

## 2019-11-01 DIAGNOSIS — Z00.00 ANNUAL PHYSICAL EXAM: ICD-10-CM

## 2019-11-01 LAB
ANION GAP SERPL CALC-SCNC: 8 MMOL/L (ref 8–16)
BASOPHILS # BLD AUTO: 0.06 K/UL (ref 0–0.2)
BASOPHILS NFR BLD: 0.8 % (ref 0–1.9)
BUN SERPL-MCNC: 21 MG/DL (ref 8–23)
CALCIUM SERPL-MCNC: 8.9 MG/DL (ref 8.7–10.5)
CHLORIDE SERPL-SCNC: 109 MMOL/L (ref 95–110)
CO2 SERPL-SCNC: 23 MMOL/L (ref 23–29)
COMPLEXED PSA SERPL-MCNC: 0.67 NG/ML (ref 0–4)
CREAT SERPL-MCNC: 1 MG/DL (ref 0.5–1.4)
DIFFERENTIAL METHOD: ABNORMAL
EOSINOPHIL # BLD AUTO: 0.3 K/UL (ref 0–0.5)
EOSINOPHIL NFR BLD: 3.8 % (ref 0–8)
ERYTHROCYTE [DISTWIDTH] IN BLOOD BY AUTOMATED COUNT: 12.6 % (ref 11.5–14.5)
EST. GFR  (AFRICAN AMERICAN): >60 ML/MIN/1.73 M^2
EST. GFR  (NON AFRICAN AMERICAN): >60 ML/MIN/1.73 M^2
ESTIMATED AVG GLUCOSE: 117 MG/DL (ref 68–131)
GLUCOSE SERPL-MCNC: 104 MG/DL (ref 70–110)
HBA1C MFR BLD HPLC: 5.7 % (ref 4–5.6)
HCT VFR BLD AUTO: 45.5 % (ref 40–54)
HGB BLD-MCNC: 14.4 G/DL (ref 14–18)
IMM GRANULOCYTES # BLD AUTO: 0.02 K/UL (ref 0–0.04)
IMM GRANULOCYTES NFR BLD AUTO: 0.3 % (ref 0–0.5)
LYMPHOCYTES # BLD AUTO: 2.2 K/UL (ref 1–4.8)
LYMPHOCYTES NFR BLD: 29.2 % (ref 18–48)
MCH RBC QN AUTO: 29.8 PG (ref 27–31)
MCHC RBC AUTO-ENTMCNC: 31.6 G/DL (ref 32–36)
MCV RBC AUTO: 94 FL (ref 82–98)
MONOCYTES # BLD AUTO: 0.6 K/UL (ref 0.3–1)
MONOCYTES NFR BLD: 7.6 % (ref 4–15)
NEUTROPHILS # BLD AUTO: 4.3 K/UL (ref 1.8–7.7)
NEUTROPHILS NFR BLD: 58.3 % (ref 38–73)
NRBC BLD-RTO: 0 /100 WBC
PLATELET # BLD AUTO: 221 K/UL (ref 150–350)
PMV BLD AUTO: 11.5 FL (ref 9.2–12.9)
POTASSIUM SERPL-SCNC: 4.5 MMOL/L (ref 3.5–5.1)
RBC # BLD AUTO: 4.83 M/UL (ref 4.6–6.2)
SODIUM SERPL-SCNC: 140 MMOL/L (ref 136–145)
TSH SERPL DL<=0.005 MIU/L-ACNC: 2.96 UIU/ML (ref 0.4–4)
WBC # BLD AUTO: 7.36 K/UL (ref 3.9–12.7)

## 2019-11-01 PROCEDURE — 85025 COMPLETE CBC W/AUTO DIFF WBC: CPT

## 2019-11-01 PROCEDURE — 84443 ASSAY THYROID STIM HORMONE: CPT

## 2019-11-01 PROCEDURE — 83036 HEMOGLOBIN GLYCOSYLATED A1C: CPT

## 2019-11-01 PROCEDURE — 84153 ASSAY OF PSA TOTAL: CPT

## 2019-11-01 PROCEDURE — 80048 BASIC METABOLIC PNL TOTAL CA: CPT

## 2019-11-01 PROCEDURE — 36415 COLL VENOUS BLD VENIPUNCTURE: CPT | Mod: PO

## 2019-11-06 ENCOUNTER — PATIENT MESSAGE (OUTPATIENT)
Dept: CARDIOLOGY | Facility: CLINIC | Age: 69
End: 2019-11-06

## 2019-11-13 ENCOUNTER — TELEPHONE (OUTPATIENT)
Dept: FAMILY MEDICINE | Facility: CLINIC | Age: 69
End: 2019-11-13

## 2019-11-13 DIAGNOSIS — H01.009 BLEPHARITIS, UNSPECIFIED LATERALITY, UNSPECIFIED TYPE: Primary | ICD-10-CM

## 2019-11-13 RX ORDER — ERYTHROMYCIN 5 MG/G
OINTMENT OPHTHALMIC NIGHTLY
Qty: 3.5 G | Refills: 0 | Status: SHIPPED | OUTPATIENT
Start: 2019-11-13 | End: 2020-11-17 | Stop reason: SDUPTHER

## 2020-01-06 ENCOUNTER — TELEPHONE (OUTPATIENT)
Dept: CARDIOLOGY | Facility: CLINIC | Age: 70
End: 2020-01-06

## 2020-01-06 NOTE — TELEPHONE ENCOUNTER
Pt asking if he can take Lisinopril 20mg twice daily instead of lisinopril 40mg once daily. Pt states with his work schedule it is easier for him to take it twice daily. Pt states he discussed taking lisinopril 20mg twice daily and his pharmacist filled a prescription for Lisinpril 20mg twice daily. Pt asking if OK to take Lisinopril 20mg twice daily.   Patient/Caregiver provided printed discharge information.

## 2020-01-22 ENCOUNTER — PATIENT OUTREACH (OUTPATIENT)
Dept: ADMINISTRATIVE | Facility: OTHER | Age: 70
End: 2020-01-22

## 2020-01-22 ENCOUNTER — LAB VISIT (OUTPATIENT)
Dept: LAB | Facility: HOSPITAL | Age: 70
End: 2020-01-22
Attending: NURSE PRACTITIONER
Payer: COMMERCIAL

## 2020-01-22 DIAGNOSIS — E78.00 HYPERCHOLESTEREMIA: ICD-10-CM

## 2020-01-22 DIAGNOSIS — F10.19 ALCOHOL ABUSE WITH ALCOHOL-INDUCED DISORDER: Chronic | ICD-10-CM

## 2020-01-22 DIAGNOSIS — I10 ESSENTIAL HYPERTENSION: ICD-10-CM

## 2020-01-22 LAB
ALBUMIN SERPL BCP-MCNC: 3.6 G/DL (ref 3.5–5.2)
ALP SERPL-CCNC: 77 U/L (ref 55–135)
ALT SERPL W/O P-5'-P-CCNC: 22 U/L (ref 10–44)
ANION GAP SERPL CALC-SCNC: 7 MMOL/L (ref 8–16)
AST SERPL-CCNC: 18 U/L (ref 10–40)
BILIRUB SERPL-MCNC: 0.4 MG/DL (ref 0.1–1)
BUN SERPL-MCNC: 21 MG/DL (ref 8–23)
CALCIUM SERPL-MCNC: 9 MG/DL (ref 8.7–10.5)
CHLORIDE SERPL-SCNC: 105 MMOL/L (ref 95–110)
CHOLEST SERPL-MCNC: 207 MG/DL (ref 120–199)
CHOLEST/HDLC SERPL: 4.7 {RATIO} (ref 2–5)
CO2 SERPL-SCNC: 27 MMOL/L (ref 23–29)
CREAT SERPL-MCNC: 1 MG/DL (ref 0.5–1.4)
EST. GFR  (AFRICAN AMERICAN): >60 ML/MIN/1.73 M^2
EST. GFR  (NON AFRICAN AMERICAN): >60 ML/MIN/1.73 M^2
GLUCOSE SERPL-MCNC: 102 MG/DL (ref 70–110)
HDLC SERPL-MCNC: 44 MG/DL (ref 40–75)
HDLC SERPL: 21.3 % (ref 20–50)
LDLC SERPL CALC-MCNC: 132.4 MG/DL (ref 63–159)
MAGNESIUM SERPL-MCNC: 1.8 MG/DL (ref 1.6–2.6)
NONHDLC SERPL-MCNC: 163 MG/DL
POTASSIUM SERPL-SCNC: 4.7 MMOL/L (ref 3.5–5.1)
PROT SERPL-MCNC: 6.8 G/DL (ref 6–8.4)
SODIUM SERPL-SCNC: 139 MMOL/L (ref 136–145)
TRIGL SERPL-MCNC: 153 MG/DL (ref 30–150)

## 2020-01-22 PROCEDURE — 83735 ASSAY OF MAGNESIUM: CPT

## 2020-01-22 PROCEDURE — 36415 COLL VENOUS BLD VENIPUNCTURE: CPT | Mod: PO

## 2020-01-22 PROCEDURE — 80053 COMPREHEN METABOLIC PANEL: CPT

## 2020-01-22 PROCEDURE — 80061 LIPID PANEL: CPT

## 2020-01-22 NOTE — PROGRESS NOTES
Care Everywhere: updated  Immunization: updated  Health Maintenance: updated  Media Review: n/a  Legacy Review: n/a  Order placed: n/a  Upcoming appts:n/a

## 2020-01-23 ENCOUNTER — OFFICE VISIT (OUTPATIENT)
Dept: CARDIOLOGY | Facility: CLINIC | Age: 70
End: 2020-01-23
Payer: COMMERCIAL

## 2020-01-23 VITALS
WEIGHT: 240.75 LBS | DIASTOLIC BLOOD PRESSURE: 78 MMHG | SYSTOLIC BLOOD PRESSURE: 130 MMHG | BODY MASS INDEX: 32.61 KG/M2 | HEIGHT: 72 IN | HEART RATE: 84 BPM

## 2020-01-23 DIAGNOSIS — R13.10 DYSPHAGIA, UNSPECIFIED TYPE: ICD-10-CM

## 2020-01-23 DIAGNOSIS — I10 ESSENTIAL HYPERTENSION: ICD-10-CM

## 2020-01-23 DIAGNOSIS — E66.09 CLASS 1 OBESITY DUE TO EXCESS CALORIES WITHOUT SERIOUS COMORBIDITY WITH BODY MASS INDEX (BMI) OF 32.0 TO 32.9 IN ADULT: ICD-10-CM

## 2020-01-23 DIAGNOSIS — E78.00 HYPERCHOLESTEREMIA: Primary | ICD-10-CM

## 2020-01-23 PROCEDURE — 3075F PR MOST RECENT SYSTOLIC BLOOD PRESS GE 130-139MM HG: ICD-10-PCS | Mod: CPTII,S$GLB,, | Performed by: NURSE PRACTITIONER

## 2020-01-23 PROCEDURE — 1126F AMNT PAIN NOTED NONE PRSNT: CPT | Mod: S$GLB,,, | Performed by: NURSE PRACTITIONER

## 2020-01-23 PROCEDURE — 99214 PR OFFICE/OUTPT VISIT, EST, LEVL IV, 30-39 MIN: ICD-10-PCS | Mod: S$GLB,,, | Performed by: NURSE PRACTITIONER

## 2020-01-23 PROCEDURE — 1159F PR MEDICATION LIST DOCUMENTED IN MEDICAL RECORD: ICD-10-PCS | Mod: S$GLB,,, | Performed by: NURSE PRACTITIONER

## 2020-01-23 PROCEDURE — 1159F MED LIST DOCD IN RCRD: CPT | Mod: S$GLB,,, | Performed by: NURSE PRACTITIONER

## 2020-01-23 PROCEDURE — 1126F PR PAIN SEVERITY QUANTIFIED, NO PAIN PRESENT: ICD-10-PCS | Mod: S$GLB,,, | Performed by: NURSE PRACTITIONER

## 2020-01-23 PROCEDURE — 3075F SYST BP GE 130 - 139MM HG: CPT | Mod: CPTII,S$GLB,, | Performed by: NURSE PRACTITIONER

## 2020-01-23 PROCEDURE — 3078F DIAST BP <80 MM HG: CPT | Mod: CPTII,S$GLB,, | Performed by: NURSE PRACTITIONER

## 2020-01-23 PROCEDURE — 1101F PR PT FALLS ASSESS DOC 0-1 FALLS W/OUT INJ PAST YR: ICD-10-PCS | Mod: CPTII,S$GLB,, | Performed by: NURSE PRACTITIONER

## 2020-01-23 PROCEDURE — 99214 OFFICE O/P EST MOD 30 MIN: CPT | Mod: S$GLB,,, | Performed by: NURSE PRACTITIONER

## 2020-01-23 PROCEDURE — 1101F PT FALLS ASSESS-DOCD LE1/YR: CPT | Mod: CPTII,S$GLB,, | Performed by: NURSE PRACTITIONER

## 2020-01-23 PROCEDURE — 99999 PR PBB SHADOW E&M-EST. PATIENT-LVL III: CPT | Mod: PBBFAC,,, | Performed by: NURSE PRACTITIONER

## 2020-01-23 PROCEDURE — 3078F PR MOST RECENT DIASTOLIC BLOOD PRESSURE < 80 MM HG: ICD-10-PCS | Mod: CPTII,S$GLB,, | Performed by: NURSE PRACTITIONER

## 2020-01-23 PROCEDURE — 99999 PR PBB SHADOW E&M-EST. PATIENT-LVL III: ICD-10-PCS | Mod: PBBFAC,,, | Performed by: NURSE PRACTITIONER

## 2020-01-23 RX ORDER — LISINOPRIL 20 MG/1
20 TABLET ORAL 2 TIMES DAILY
COMMUNITY
Start: 2020-01-04 | End: 2020-01-23 | Stop reason: SDUPTHER

## 2020-01-23 RX ORDER — LISINOPRIL 40 MG/1
40 TABLET ORAL 2 TIMES DAILY
Qty: 90 TABLET | Refills: 3 | Status: SHIPPED | OUTPATIENT
Start: 2020-01-23 | End: 2020-01-23

## 2020-01-23 RX ORDER — LISINOPRIL 40 MG/1
40 TABLET ORAL DAILY
Qty: 90 TABLET | Refills: 3 | Status: SHIPPED | OUTPATIENT
Start: 2020-01-23 | End: 2021-03-22

## 2020-01-23 NOTE — PATIENT INSTRUCTIONS
Increase cardiovascular exercise to 30 minutes of brisk walking a day for 4-5 days a week.  You can use a stationary bike or swim for 30 minutes a day instead of walking.  Whatever exercise you choose, make sure you are working hard enough to increase your heart rate.     LDL - bad type - improves with diet and medications: typically statins; most other medications that lower LDL have not been proven to prevent heart attacks.  May not improve significantly with exercise alone.  Ideally less than 100 mg/dl. If you have coronary artery disease, this should be well below 70 mg/dl.    HDL - good type - improves with exercise.  Ideally greater than 50 mg/dl.    TGs (triglycerides) - also bad - can change very quickly and considerably with certain foods. Improve with diet and exercise  In some cases a low carbohydrate diet will lower TGs better than a low fat diet.  Ideal range  mg/dl.    Sugar, fat and cholesterol in food:     A sensible diet that limits the intake of sugars, saturated (bad) fats and trans fats while increasing the intake of unsaturated (good)  fats and plant proteins is the basis of the current dietary recommendations.      We now recommend drastically reducing the intake of sugar. There is less emphasis on excluding fat.     Cholesterol in our food is no longer a significant concern, mainly because it is generally present in small amounts. However please do not confuse this with the role of cholesterol in our blood and arteries. Make no mistake, it is cholesterol that clogs up our arteries whether it comes from our food or is manufactured by our bodies.       Most foods that are high in cholesterol are also high in saturated fat. But there is way more saturated fat than cholesterol in these foods. So its the saturated fat content that matters more than cholesterol. On the other hand there are a handful of foods that are high in cholesterol but do not contain much saturated fat: eggs, shrimp, crab  legs and crawfish are OK to eat.       Saturated fat is the bad fat - you should limit your intake of this. Deep fried foods, meats and other animal fats are high in saturated fat. Cookies, donuts and most dessert and cakes are usually high in both saturated fat and sugar.       Unsaturated fat is the good fat. It contains the same number of calories as saturated fat but these fats do not get deposited in our arteries. The Mediterranean style diet encourages the intake of unsaturated fat - olive oil, avocado and unsalted nuts. So instead of baking a piece of fish, it may be better to pan-gan it using olive oil.     You should eat a few servings of vegetables (and fruit as long as you are not diabetic) everyday. Substitute some plant proteins in place of meat: beans, lentils, quinoa and oatmeal. They are lean proteins.     Do not use stick butter or stick margarine. Butter that comes in a tub is soft butter. It consists of 1/2 butter and 1/2 vegetable oil., either canola or olive oil It is fine to use that.       Trans fats should definitely be avoided. Most foods that are labelled as containing 0 gms of trans fat can still contain several hundred milligrams of trans fat: creamer, margarine, refrigerator dough, deep fried foods, ready made frosting, potato, corn and torilla chips, cakes, cookies, pie crusts and crackers containing shortening made with hydrogenated vegetable oil.

## 2020-01-23 NOTE — PROGRESS NOTES
Mr. Salcedo is a patient of Dr. Tompkins and was last seen in [facility] 10/17/2019.      Subjective:   Patient ID:  Riky Salcedo is a 69 y.o. male who presents for follow-up of Hypertension and Hyperlipidemia    Problem List:  HTN  HLD  CACS 0 in 2014; repeat 2019 is 2  Family history of MI (parents) in their 60s    HPI:  Mr. Salcedo is routine follow up.  Patient denies chest pain with exertion or at rest, palpitations, SOB, GLEASON, dizziness, syncope, edema, orthopnea, PND, or claudication.  Reports no routine exercise.  Reports sometimes missing his evening dose of the lisinopril.  Requesting to go back to lisinopril 40 mg daily.      Review of Systems   Constitution: Negative for decreased appetite, diaphoresis, malaise/fatigue, weight gain and weight loss.   Eyes: Negative for visual disturbance.   Cardiovascular: Negative for chest pain, claudication, dyspnea on exertion, irregular heartbeat, leg swelling, near-syncope, orthopnea, palpitations, paroxysmal nocturnal dyspnea and syncope.        Denies chest pressure   Respiratory: Negative for cough, hemoptysis, shortness of breath, sleep disturbances due to breathing and snoring.    Endocrine: Negative for cold intolerance and heat intolerance.   Hematologic/Lymphatic: Negative for bleeding problem. Does not bruise/bleed easily.   Musculoskeletal: Positive for muscle cramps. Negative for myalgias.   Gastrointestinal: Positive for dysphagia. Negative for bloating, abdominal pain, anorexia, change in bowel habit, constipation, diarrhea, nausea and vomiting.   Genitourinary: Positive for frequency and nocturia.   Neurological: Positive for excessive daytime sleepiness. Negative for difficulty with concentration, disturbances in coordination, dizziness, headaches, light-headedness, loss of balance, numbness and weakness.   Psychiatric/Behavioral: The patient does not have insomnia.        Allergies and current medications updated and reviewed:  Review of  patient's allergies indicates:  No Known Allergies  Current Outpatient Medications   Medication Sig    desonide 0.05% (DESOWEN) 0.05 % Oint APPLY ON SCROTUM TWICE A DAY FOR 1 TO 2 WEEKS THEN AS NEEDED FOR FLARES    lisinopril (PRINIVIL,ZESTRIL) 20 MG tablet Take 1 tablet (20 mg total) by mouth 2 (two) times daily.    valACYclovir (VALTREX) 1000 MG tablet TAKE 1 TABLET BY MOUTH TWICE DAILY     No current facility-administered medications for this visit.        Objective:        /78   Pulse 84   Ht 6' (1.829 m)   Wt 109.2 kg (240 lb 11.9 oz)   BMI 32.65 kg/m²       Physical Exam   Constitutional: He is oriented to person, place, and time. Vital signs are normal. He appears well-developed and well-nourished. He is active. No distress.   HENT:   Head: Normocephalic and atraumatic.   Eyes: Conjunctivae and lids are normal. No scleral icterus.   Neck: Neck supple. Normal carotid pulses, no hepatojugular reflux and no JVD present. Carotid bruit is not present.   Cardiovascular: Normal rate, regular rhythm, S1 normal, S2 normal and intact distal pulses. PMI is not displaced. Exam reveals no gallop and no friction rub.   No murmur heard.  Pulses:       Carotid pulses are 2+ on the right side, and 2+ on the left side.       Radial pulses are 2+ on the right side, and 2+ on the left side.        Dorsalis pedis pulses are 2+ on the right side, and 2+ on the left side.        Posterior tibial pulses are 1+ on the right side, and 1+ on the left side.   Pulmonary/Chest: Effort normal and breath sounds normal. No respiratory distress. He has no decreased breath sounds. He has no wheezes. He has no rhonchi. He has no rales. He exhibits no tenderness.   Abdominal: Soft. Normal appearance and bowel sounds are normal. He exhibits no distension, no fluid wave, no abdominal bruit, no ascites and no pulsatile midline mass. There is no hepatosplenomegaly. There is no tenderness.   Musculoskeletal: He exhibits no edema.    Neurological: He is alert and oriented to person, place, and time. Gait normal.   Skin: Skin is warm, dry and intact. No rash noted. He is not diaphoretic. Nails show no clubbing.   Psychiatric: He has a normal mood and affect. His speech is normal and behavior is normal. Judgment and thought content normal. Cognition and memory are normal.   Nursing note and vitals reviewed.      Chemistry        Component Value Date/Time     01/22/2020 0812    K 4.7 01/22/2020 0812     01/22/2020 0812    CO2 27 01/22/2020 0812    BUN 21 01/22/2020 0812    CREATININE 1.0 01/22/2020 0812     01/22/2020 0812        Component Value Date/Time    CALCIUM 9.0 01/22/2020 0812    ALKPHOS 77 01/22/2020 0812    AST 18 01/22/2020 0812    ALT 22 01/22/2020 0812    BILITOT 0.4 01/22/2020 0812    ESTGFRAFRICA >60.0 01/22/2020 0812    EGFRNONAA >60.0 01/22/2020 0812        Lab Results   Component Value Date    HGBA1C 5.7 (H) 11/01/2019     Recent Labs   Lab 11/01/19  0754 01/22/20  0812   WBC 7.36  --    Hemoglobin 14.4  --    Hematocrit 45.5  --    Mean Corpuscular Volume 94  --    Platelets 221  --    TSH 2.962  --    Cholesterol  --  207 H   HDL  --  44   LDL Cholesterol  --  132.4   Triglycerides  --  153 H   Hdl/Cholesterol Ratio  --  21.3              Test(s) Reviewed  I have reviewed the following in detail:  [] Stress test   [] Angiography   [x] Echocardiogram   [x] Labs   [] Other:         Assessment/Plan:   1. Hypercholesteremia  His CACS is very low at 2.  He is not interested in statin therapy.  He continues not to exercise.  Encouraged increased CV exercise.     2. Essential hypertension  BP at goal <130/80 when he takes both dose of the lisinopril but forgets the evening dose and then his bp is above goal. Change the lisinopril back to the 40 mg daily.    - lisinopril (PRINIVIL,ZESTRIL) 40 MG tablet; Take 1 tablet (40 mg total) by mouth daily.  Dispense: 90 tablet; Refill: 3    3. Class 1 obesity due to excess  calories without serious comorbidity with body mass index (BMI) of 32.0 to 32.9 in adult  BMI 32.7 Encouraged increased CV exercise to 30 minutes a day for 5 days a week.     4.  Dysphagia  Reports difficulty with swallowing.  He reports previous h/o needing esophageal stretching.  He drinks at least 15 beers a week and has a significant alcohol hx.  Refer to GI.      Patient was discussed with but not examined by Dr. Tompkins    Follow up in about 1 year (around 1/23/2021).

## 2020-02-27 ENCOUNTER — TELEPHONE (OUTPATIENT)
Dept: DERMATOLOGY | Facility: CLINIC | Age: 70
End: 2020-02-27

## 2020-02-27 NOTE — TELEPHONE ENCOUNTER
----- Message from Bailee Al sent at 2/27/2020  2:59 PM CST -----  Contact: Pt  Pt wants to schedule an marizol with Dr. Veloz     Pt has a bump on scalp and is changing shape and color     Pt can be reached [phone] or  [phone]

## 2020-02-27 NOTE — TELEPHONE ENCOUNTER
----- Message from Merissa Foote LPN sent at 2/24/2020  4:28 PM CST -----  Contact: patient  Left message that Dr. Gomez and staff were out of the office until Wednesday and will return his call when they return.  ----- Message -----  From: Merissa Arreola  Sent: 2/24/2020   9:29 AM CST  To: Patricia HONEYCUTT Staff    Please call above patient at [phone] need to get in soon with the doctor have bump on head waiting on a call from the nurse thanks.

## 2020-02-28 ENCOUNTER — OFFICE VISIT (OUTPATIENT)
Dept: DERMATOLOGY | Facility: CLINIC | Age: 70
End: 2020-02-28
Payer: COMMERCIAL

## 2020-02-28 ENCOUNTER — PATIENT OUTREACH (OUTPATIENT)
Dept: ADMINISTRATIVE | Facility: OTHER | Age: 70
End: 2020-02-28

## 2020-02-28 DIAGNOSIS — D22.5 MELANOCYTIC NEVI OF TRUNK: ICD-10-CM

## 2020-02-28 DIAGNOSIS — L29.1 SCROTAL PRURITUS: ICD-10-CM

## 2020-02-28 DIAGNOSIS — D17.1 LIPOMA OF TORSO: ICD-10-CM

## 2020-02-28 DIAGNOSIS — L82.1 SEBORRHEIC KERATOSIS: Primary | ICD-10-CM

## 2020-02-28 DIAGNOSIS — L72.0 EIC (EPIDERMAL INCLUSION CYST): ICD-10-CM

## 2020-02-28 PROCEDURE — 99214 OFFICE O/P EST MOD 30 MIN: CPT | Mod: S$GLB,,, | Performed by: DERMATOLOGY

## 2020-02-28 PROCEDURE — 1159F MED LIST DOCD IN RCRD: CPT | Mod: S$GLB,,, | Performed by: DERMATOLOGY

## 2020-02-28 PROCEDURE — 1126F AMNT PAIN NOTED NONE PRSNT: CPT | Mod: S$GLB,,, | Performed by: DERMATOLOGY

## 2020-02-28 PROCEDURE — 1101F PR PT FALLS ASSESS DOC 0-1 FALLS W/OUT INJ PAST YR: ICD-10-PCS | Mod: CPTII,S$GLB,, | Performed by: DERMATOLOGY

## 2020-02-28 PROCEDURE — 99214 PR OFFICE/OUTPT VISIT, EST, LEVL IV, 30-39 MIN: ICD-10-PCS | Mod: S$GLB,,, | Performed by: DERMATOLOGY

## 2020-02-28 PROCEDURE — 1101F PT FALLS ASSESS-DOCD LE1/YR: CPT | Mod: CPTII,S$GLB,, | Performed by: DERMATOLOGY

## 2020-02-28 PROCEDURE — 1126F PR PAIN SEVERITY QUANTIFIED, NO PAIN PRESENT: ICD-10-PCS | Mod: S$GLB,,, | Performed by: DERMATOLOGY

## 2020-02-28 PROCEDURE — 1159F PR MEDICATION LIST DOCUMENTED IN MEDICAL RECORD: ICD-10-PCS | Mod: S$GLB,,, | Performed by: DERMATOLOGY

## 2020-02-28 RX ORDER — DESONIDE 0.5 MG/G
CREAM TOPICAL
Qty: 60 G | Refills: 1 | Status: SHIPPED | OUTPATIENT
Start: 2020-02-28 | End: 2020-11-17 | Stop reason: ALTCHOICE

## 2020-02-28 NOTE — PROGRESS NOTES
Subjective:       Patient ID:  Riky Salcedo is a 69 y.o. male who presents for   Chief Complaint   Patient presents with    Lesion     left temple      Lesion  - Initial  Affected locations: face  Duration: 3 years (recently changed)  Signs / symptoms: scaling, irritated, inflamed and swelling (discolored)  Severity: mild  Timing: constant  Aggravated by: friction (from glasses)  Relieving factors/Treatments tried: nothing    Itching  - Initial  Affected locations: genitalia  Duration: for years.  Signs / symptoms: itching (no rash)  Severity: mild to moderate  Timing: intermittent (every 1-2 months)  Aggravated by: nothing  Treatments tried: a couple of days of desonide ointment helps but is too greasy.  Improvement on treatment: significant      Review of Systems   Skin: Negative for itching and rash.   Hematologic/Lymphatic: Bruises/bleeds easily.        Objective:    Physical Exam   Constitutional: He appears well-developed and well-nourished. No distress.   Neurological: He is alert and oriented to person, place, and time. He is not disoriented.   Psychiatric: He has a normal mood and affect.   Skin:   Areas Examined (abnormalities noted in diagram):   Scalp / Hair Palpated and Inspected  Head / Face Inspection Performed  Neck Inspection Performed  Chest / Axilla Inspection Performed  Abdomen Inspection Performed  Back Inspection Performed  RUE Inspected  LUE Inspection Performed  Nails and Digits Inspection Performed                 pt declines genital exam  Diagram Legend     Erythematous scaling macule/papule c/w actinic keratosis       Vascular papule c/w angioma      Pigmented verrucoid papule/plaque c/w seborrheic keratosis      Yellow umbilicated papule c/w sebaceous hyperplasia      Irregularly shaped tan macule c/w lentigo     1-2 mm smooth white papules consistent with Milia      Movable subcutaneous cyst with punctum c/w epidermal inclusion cyst      Subcutaneous movable cyst c/w pilar cyst       Firm pink to brown papule c/w dermatofibroma      Pedunculated fleshy papule(s) c/w skin tag(s)      Evenly pigmented macule c/w junctional nevus     Mildly variegated pigmented, slightly irregular-bordered macule c/w mildly atypical nevus      Flesh colored to evenly pigmented papule c/w intradermal nevus       Pink pearly papule/plaque c/w basal cell carcinoma      Erythematous hyperkeratotic cursted plaque c/w SCC      Surgical scar with no sign of skin cancer recurrence      Open and closed comedones      Inflammatory papules and pustules      Verrucoid papule consistent consistent with wart     Erythematous eczematous patches and plaques     Dystrophic onycholytic nail with subungual debris c/w onychomycosis     Umbilicated papule    Erythematous-base heme-crusted tan verrucoid plaque consistent with inflamed seborrheic keratosis     Erythematous Silvery Scaling Plaque c/w Psoriasis     See annotation      Assessment / Plan:        Seborrheic keratosis  These are benign, inherited growths without a malignant potential. Reassurance given to patient. No treatment is necessary. Handout was provided.  Offered cryo for one that was inflamed and pt declined.    Scrotal pruritus  -     desonide (DESOWEN) 0.05 % cream; Apply to affected area of scrotum BID prn itch. Do not use for longer than 2 weeks in a row.  Dispense: 60 g; Refill: 1  RTC if worsens or no longer responds to desonide.    Melanocytic nevi of trunk  Multiple benign-appearing nevi present on exam today. Reassurance provided. Counseled patient to periodically examine moles and return to clinic if any changes in size, shape, or color are noted or if it becomes symptomatic (bleeding, itching, pain, etc).    EIC (epidermal inclusion cyst)  This is a benign cyst of the hair follicle. Reassurance provided. No treatment is necessary unless it is symptomatic.     Lipoma of torso  Counseled pt that this is likely a benign collection of fatty tissue. Rec'd  general surgery due to size if he desires excision.      Follow up if symptoms worsen or fail to improve.

## 2020-03-12 ENCOUNTER — TELEPHONE (OUTPATIENT)
Dept: INTERNAL MEDICINE | Facility: CLINIC | Age: 70
End: 2020-03-12

## 2020-03-12 ENCOUNTER — PATIENT MESSAGE (OUTPATIENT)
Dept: FAMILY MEDICINE | Facility: CLINIC | Age: 70
End: 2020-03-12

## 2020-03-12 NOTE — TELEPHONE ENCOUNTER
----- Message from Chanel Judge sent at 3/12/2020  3:47 PM CDT -----  Contact: Self [phone]  Patient would like to speak with you about does he need a pneumonia shot. Please advise

## 2020-05-13 ENCOUNTER — TELEPHONE (OUTPATIENT)
Dept: INTERNAL MEDICINE | Facility: CLINIC | Age: 70
End: 2020-05-13

## 2020-05-13 NOTE — TELEPHONE ENCOUNTER
----- Message from Chanel Judge sent at 5/13/2020  9:17 AM CDT -----  Contact: Self [phone]  Patient would like to speak with you about should he take Vitamin D. Please advise

## 2020-07-09 DIAGNOSIS — Z20.822 EXPOSURE TO COVID-19 VIRUS: Primary | ICD-10-CM

## 2020-07-10 ENCOUNTER — LAB VISIT (OUTPATIENT)
Dept: LAB | Facility: HOSPITAL | Age: 70
End: 2020-07-10
Attending: FAMILY MEDICINE
Payer: COMMERCIAL

## 2020-07-10 DIAGNOSIS — Z20.822 EXPOSURE TO COVID-19 VIRUS: ICD-10-CM

## 2020-07-10 LAB — SARS-COV-2 IGG SERPLBLD QL IA.RAPID: NEGATIVE

## 2020-07-10 PROCEDURE — 36415 COLL VENOUS BLD VENIPUNCTURE: CPT | Mod: PO

## 2020-07-10 PROCEDURE — 86769 SARS-COV-2 COVID-19 ANTIBODY: CPT

## 2020-08-29 ENCOUNTER — NURSE TRIAGE (OUTPATIENT)
Dept: ADMINISTRATIVE | Facility: CLINIC | Age: 70
End: 2020-08-29

## 2020-08-29 NOTE — TELEPHONE ENCOUNTER
"    Reason for Disposition   HIGH RISK patient (e.g., age > 64 years, diabetes, heart or lung disease, weak immune system)    Additional Information   Negative: Severe difficulty breathing (e.g., struggling for each breath, speaks in single words)   Negative: Difficult to awaken or acting confused (e.g., disoriented, slurred speech)   Negative: Bluish (or gray) lips or face now   Negative: Shock suspected (e.g., cold/pale/clammy skin, too weak to stand, low BP, rapid pulse)   Negative: Sounds like a life-threatening emergency to the triager   Negative: SEVERE or constant chest pain (Exception: mild central chest pain, present only when coughing)   Negative: MODERATE difficulty breathing (e.g., speaks in phrases, SOB even at rest, pulse 100-120)   Negative: Patient sounds very sick or weak to the triager   Negative: MILD difficulty breathing (e.g., minimal/no SOB at rest, SOB with walking, pulse <100)   Negative: Chest pain   Negative: Fever > 103 F (39.4 C)   Negative: [1] Fever > 101 F (38.3 C) AND [2] age > 60   Negative: [1] Fever > 100.0 F (37.8 C) AND [2] bedridden (e.g., nursing home patient, CVA, chronic illness, recovering from surgery)    Protocols used: CORONAVIRUS (COVID-19) - DIAGNOSED OR [id number]    Pt reporting COVID symptoms. denies chest pain, sob, difficulty breathing or fever. Stated "I have symptoms of COVID chills, night sweating, diarrhea, and tiredness. My daughter works in an [employer] clinic" Per triage protocol, Pt is considered high risk due to age, and Provider on call must be called. Operate paged Provider on call. Message also sent to Provider in secure chat. Pt advised the MD has been paged and to call back if he becomes worse. Advised he will receive a call back from triage Nurse or MD. Pt verbalized understanding. Spoke to Provider on call Dr. Miles. MD stated " Tell him go to an Urgent Care, I don't feel comfortable putting a COVID test order in, it could be " "anything."  Pt verbalized understanding of MD's instructions.  "

## 2020-10-10 ENCOUNTER — PATIENT MESSAGE (OUTPATIENT)
Dept: FAMILY MEDICINE | Facility: CLINIC | Age: 70
End: 2020-10-10

## 2020-10-12 ENCOUNTER — TELEPHONE (OUTPATIENT)
Dept: FAMILY MEDICINE | Facility: CLINIC | Age: 70
End: 2020-10-12

## 2020-10-13 ENCOUNTER — TELEPHONE (OUTPATIENT)
Dept: FAMILY MEDICINE | Facility: CLINIC | Age: 70
End: 2020-10-13

## 2020-10-13 DIAGNOSIS — Z12.11 SCREEN FOR COLON CANCER: Primary | ICD-10-CM

## 2020-10-16 ENCOUNTER — TELEPHONE (OUTPATIENT)
Dept: ADMINISTRATIVE | Facility: HOSPITAL | Age: 70
End: 2020-10-16

## 2020-10-16 ENCOUNTER — PATIENT MESSAGE (OUTPATIENT)
Dept: ADMINISTRATIVE | Facility: HOSPITAL | Age: 70
End: 2020-10-16

## 2020-10-26 ENCOUNTER — PATIENT MESSAGE (OUTPATIENT)
Dept: ADMINISTRATIVE | Facility: HOSPITAL | Age: 70
End: 2020-10-26

## 2020-11-05 ENCOUNTER — PATIENT OUTREACH (OUTPATIENT)
Dept: FAMILY MEDICINE | Facility: CLINIC | Age: 70
End: 2020-11-05

## 2020-11-08 ENCOUNTER — PATIENT MESSAGE (OUTPATIENT)
Dept: FAMILY MEDICINE | Facility: CLINIC | Age: 70
End: 2020-11-08

## 2020-11-09 ENCOUNTER — TELEPHONE (OUTPATIENT)
Dept: FAMILY MEDICINE | Facility: CLINIC | Age: 70
End: 2020-11-09

## 2020-11-09 DIAGNOSIS — I10 ESSENTIAL HYPERTENSION: Primary | ICD-10-CM

## 2020-11-09 DIAGNOSIS — R73.03 PREDIABETES: ICD-10-CM

## 2020-11-11 ENCOUNTER — LAB VISIT (OUTPATIENT)
Dept: LAB | Facility: HOSPITAL | Age: 70
End: 2020-11-11
Attending: FAMILY MEDICINE
Payer: COMMERCIAL

## 2020-11-11 DIAGNOSIS — R73.03 PREDIABETES: ICD-10-CM

## 2020-11-11 DIAGNOSIS — I10 ESSENTIAL HYPERTENSION: ICD-10-CM

## 2020-11-11 LAB
ALBUMIN SERPL BCP-MCNC: 3.5 G/DL (ref 3.5–5.2)
ALP SERPL-CCNC: 82 U/L (ref 55–135)
ALT SERPL W/O P-5'-P-CCNC: 14 U/L (ref 10–44)
ANION GAP SERPL CALC-SCNC: 6 MMOL/L (ref 8–16)
AST SERPL-CCNC: 15 U/L (ref 10–40)
BASOPHILS # BLD AUTO: 0.05 K/UL (ref 0–0.2)
BASOPHILS NFR BLD: 0.8 % (ref 0–1.9)
BILIRUB SERPL-MCNC: 0.6 MG/DL (ref 0.1–1)
BUN SERPL-MCNC: 18 MG/DL (ref 8–23)
CALCIUM SERPL-MCNC: 8.9 MG/DL (ref 8.7–10.5)
CHLORIDE SERPL-SCNC: 103 MMOL/L (ref 95–110)
CHOLEST SERPL-MCNC: 175 MG/DL (ref 120–199)
CHOLEST/HDLC SERPL: 4.4 {RATIO} (ref 2–5)
CO2 SERPL-SCNC: 29 MMOL/L (ref 23–29)
CREAT SERPL-MCNC: 1 MG/DL (ref 0.5–1.4)
DIFFERENTIAL METHOD: ABNORMAL
EOSINOPHIL # BLD AUTO: 0.2 K/UL (ref 0–0.5)
EOSINOPHIL NFR BLD: 2.6 % (ref 0–8)
ERYTHROCYTE [DISTWIDTH] IN BLOOD BY AUTOMATED COUNT: 13.5 % (ref 11.5–14.5)
EST. GFR  (AFRICAN AMERICAN): >60 ML/MIN/1.73 M^2
EST. GFR  (NON AFRICAN AMERICAN): >60 ML/MIN/1.73 M^2
ESTIMATED AVG GLUCOSE: 111 MG/DL (ref 68–131)
GLUCOSE SERPL-MCNC: 103 MG/DL (ref 70–110)
HBA1C MFR BLD HPLC: 5.5 % (ref 4–5.6)
HCT VFR BLD AUTO: 46.3 % (ref 40–54)
HDLC SERPL-MCNC: 40 MG/DL (ref 40–75)
HDLC SERPL: 22.9 % (ref 20–50)
HGB BLD-MCNC: 14.7 G/DL (ref 14–18)
IMM GRANULOCYTES # BLD AUTO: 0.01 K/UL (ref 0–0.04)
IMM GRANULOCYTES NFR BLD AUTO: 0.2 % (ref 0–0.5)
LDLC SERPL CALC-MCNC: 114.6 MG/DL (ref 63–159)
LYMPHOCYTES # BLD AUTO: 1.8 K/UL (ref 1–4.8)
LYMPHOCYTES NFR BLD: 27.9 % (ref 18–48)
MCH RBC QN AUTO: 29.5 PG (ref 27–31)
MCHC RBC AUTO-ENTMCNC: 31.7 G/DL (ref 32–36)
MCV RBC AUTO: 93 FL (ref 82–98)
MONOCYTES # BLD AUTO: 0.5 K/UL (ref 0.3–1)
MONOCYTES NFR BLD: 7.6 % (ref 4–15)
NEUTROPHILS # BLD AUTO: 3.9 K/UL (ref 1.8–7.7)
NEUTROPHILS NFR BLD: 60.9 % (ref 38–73)
NONHDLC SERPL-MCNC: 135 MG/DL
NRBC BLD-RTO: 0 /100 WBC
PLATELET # BLD AUTO: 220 K/UL (ref 150–350)
PMV BLD AUTO: 11.6 FL (ref 9.2–12.9)
POTASSIUM SERPL-SCNC: 4.8 MMOL/L (ref 3.5–5.1)
PROT SERPL-MCNC: 6.7 G/DL (ref 6–8.4)
RBC # BLD AUTO: 4.99 M/UL (ref 4.6–6.2)
SODIUM SERPL-SCNC: 138 MMOL/L (ref 136–145)
TRIGL SERPL-MCNC: 102 MG/DL (ref 30–150)
TSH SERPL DL<=0.005 MIU/L-ACNC: 2.85 UIU/ML (ref 0.4–4)
WBC # BLD AUTO: 6.45 K/UL (ref 3.9–12.7)

## 2020-11-11 PROCEDURE — 85025 COMPLETE CBC W/AUTO DIFF WBC: CPT

## 2020-11-11 PROCEDURE — 36415 COLL VENOUS BLD VENIPUNCTURE: CPT | Mod: PO

## 2020-11-11 PROCEDURE — 80061 LIPID PANEL: CPT

## 2020-11-11 PROCEDURE — 84443 ASSAY THYROID STIM HORMONE: CPT

## 2020-11-11 PROCEDURE — 83036 HEMOGLOBIN GLYCOSYLATED A1C: CPT

## 2020-11-11 PROCEDURE — 80053 COMPREHEN METABOLIC PANEL: CPT

## 2020-11-12 ENCOUNTER — LAB VISIT (OUTPATIENT)
Dept: LAB | Facility: HOSPITAL | Age: 70
End: 2020-11-12
Attending: FAMILY MEDICINE
Payer: COMMERCIAL

## 2020-11-12 DIAGNOSIS — Z12.11 SCREEN FOR COLON CANCER: ICD-10-CM

## 2020-11-12 PROCEDURE — 82274 ASSAY TEST FOR BLOOD FECAL: CPT

## 2020-11-16 LAB — HEMOCCULT STL QL IA: NEGATIVE

## 2020-11-17 ENCOUNTER — OFFICE VISIT (OUTPATIENT)
Dept: FAMILY MEDICINE | Facility: CLINIC | Age: 70
End: 2020-11-17
Payer: COMMERCIAL

## 2020-11-17 VITALS
DIASTOLIC BLOOD PRESSURE: 86 MMHG | TEMPERATURE: 97 F | HEIGHT: 72 IN | HEART RATE: 90 BPM | BODY MASS INDEX: 29.97 KG/M2 | OXYGEN SATURATION: 97 % | WEIGHT: 221.31 LBS | SYSTOLIC BLOOD PRESSURE: 140 MMHG

## 2020-11-17 DIAGNOSIS — L28.0 LSC (LICHEN SIMPLEX CHRONICUS): ICD-10-CM

## 2020-11-17 DIAGNOSIS — Z00.00 ANNUAL PHYSICAL EXAM: Primary | ICD-10-CM

## 2020-11-17 DIAGNOSIS — I10 ESSENTIAL HYPERTENSION: ICD-10-CM

## 2020-11-17 DIAGNOSIS — L29.1 PRURITUS SCROTI: ICD-10-CM

## 2020-11-17 DIAGNOSIS — H01.009 BLEPHARITIS, UNSPECIFIED LATERALITY, UNSPECIFIED TYPE: ICD-10-CM

## 2020-11-17 DIAGNOSIS — E66.9 OBESITY (BMI 30.0-34.9): ICD-10-CM

## 2020-11-17 PROCEDURE — 3288F PR FALLS RISK ASSESSMENT DOCUMENTED: ICD-10-PCS | Mod: CPTII,S$GLB,, | Performed by: NURSE PRACTITIONER

## 2020-11-17 PROCEDURE — 1159F PR MEDICATION LIST DOCUMENTED IN MEDICAL RECORD: ICD-10-PCS | Mod: S$GLB,,, | Performed by: NURSE PRACTITIONER

## 2020-11-17 PROCEDURE — 1159F MED LIST DOCD IN RCRD: CPT | Mod: S$GLB,,, | Performed by: NURSE PRACTITIONER

## 2020-11-17 PROCEDURE — 1126F PR PAIN SEVERITY QUANTIFIED, NO PAIN PRESENT: ICD-10-PCS | Mod: S$GLB,,, | Performed by: NURSE PRACTITIONER

## 2020-11-17 PROCEDURE — 99214 PR OFFICE/OUTPT VISIT, EST, LEVL IV, 30-39 MIN: ICD-10-PCS | Mod: S$GLB,,, | Performed by: NURSE PRACTITIONER

## 2020-11-17 PROCEDURE — 99214 OFFICE O/P EST MOD 30 MIN: CPT | Mod: S$GLB,,, | Performed by: NURSE PRACTITIONER

## 2020-11-17 PROCEDURE — 3079F PR MOST RECENT DIASTOLIC BLOOD PRESSURE 80-89 MM HG: ICD-10-PCS | Mod: CPTII,S$GLB,, | Performed by: NURSE PRACTITIONER

## 2020-11-17 PROCEDURE — 3008F PR BODY MASS INDEX (BMI) DOCUMENTED: ICD-10-PCS | Mod: CPTII,S$GLB,, | Performed by: NURSE PRACTITIONER

## 2020-11-17 PROCEDURE — 1101F PR PT FALLS ASSESS DOC 0-1 FALLS W/OUT INJ PAST YR: ICD-10-PCS | Mod: CPTII,S$GLB,, | Performed by: NURSE PRACTITIONER

## 2020-11-17 PROCEDURE — 3288F FALL RISK ASSESSMENT DOCD: CPT | Mod: CPTII,S$GLB,, | Performed by: NURSE PRACTITIONER

## 2020-11-17 PROCEDURE — 3008F BODY MASS INDEX DOCD: CPT | Mod: CPTII,S$GLB,, | Performed by: NURSE PRACTITIONER

## 2020-11-17 PROCEDURE — 99999 PR PBB SHADOW E&M-EST. PATIENT-LVL IV: CPT | Mod: PBBFAC,,, | Performed by: NURSE PRACTITIONER

## 2020-11-17 PROCEDURE — 3079F DIAST BP 80-89 MM HG: CPT | Mod: CPTII,S$GLB,, | Performed by: NURSE PRACTITIONER

## 2020-11-17 PROCEDURE — 1126F AMNT PAIN NOTED NONE PRSNT: CPT | Mod: S$GLB,,, | Performed by: NURSE PRACTITIONER

## 2020-11-17 PROCEDURE — 99999 PR PBB SHADOW E&M-EST. PATIENT-LVL IV: ICD-10-PCS | Mod: PBBFAC,,, | Performed by: NURSE PRACTITIONER

## 2020-11-17 PROCEDURE — 3077F PR MOST RECENT SYSTOLIC BLOOD PRESSURE >= 140 MM HG: ICD-10-PCS | Mod: CPTII,S$GLB,, | Performed by: NURSE PRACTITIONER

## 2020-11-17 PROCEDURE — 3077F SYST BP >= 140 MM HG: CPT | Mod: CPTII,S$GLB,, | Performed by: NURSE PRACTITIONER

## 2020-11-17 PROCEDURE — 1101F PT FALLS ASSESS-DOCD LE1/YR: CPT | Mod: CPTII,S$GLB,, | Performed by: NURSE PRACTITIONER

## 2020-11-17 RX ORDER — ERYTHROMYCIN 5 MG/G
OINTMENT OPHTHALMIC NIGHTLY
Qty: 3.5 G | Refills: 0 | Status: SHIPPED | OUTPATIENT
Start: 2020-11-17 | End: 2020-11-27

## 2020-11-17 RX ORDER — DESONIDE 0.5 MG/G
OINTMENT TOPICAL
Qty: 60 G | Refills: 0 | Status: SHIPPED | OUTPATIENT
Start: 2020-11-17 | End: 2023-04-11

## 2020-11-17 NOTE — PROGRESS NOTES
Subjective:       Patient ID: Riky Salcedo is a 70 y.o. male.    Chief Complaint: Annual Exam and Results (of labs)    This is a 69 yo male patient of Dr. Shukla who is new to me. He presents today for an annual physical exam. PMH includes    Patient Active Problem List:     Hypertension     HSV-1 (herpes simplex virus 1) infection     Male hypogonadism     Lipoma of back     Alcohol abuse with alcohol-induced disorder     Hypercholesteremia     Poor diet     Class 1 obesity due to excess calories without serious comorbidity with body mass index (BMI) of 32.0 to 32.9 in adult     Left arm pain     Chest discomfort     Palpitations     Lung granuloma    BP elevated today, about the same with recheck. Denies fever, chills, chest pain, SOB, dyspnea, palpitations, visual changes, HA, dizziness, or N/V/D. [place] 1[place] and was drinking alcohol. Not limiting salty foods but plans to limit them going forward. Avoids greasy/fatty foods. Reviewed recent lab results with patient. Not currently exercising. Drinks plenty water daily. Reports some issues with redness, inflammation, and clear drainage from both eyes that happens on and off; has happened before. Denies eye pain. No other issues or complaints.    Review of Systems   Constitutional: Negative for activity change, chills, fatigue and fever.   HENT: Negative for sore throat and trouble swallowing.    Eyes: Positive for discharge and itching. Negative for pain and visual disturbance.   Respiratory: Negative for cough, chest tightness and shortness of breath.    Cardiovascular: Negative for chest pain, palpitations and leg swelling.   Gastrointestinal: Negative for abdominal pain, blood in stool, constipation, diarrhea, nausea and vomiting.   Endocrine: Negative for polydipsia, polyphagia and polyuria.   Genitourinary: Negative for difficulty urinating, frequency and hematuria.   Musculoskeletal: Negative for gait problem.    Neurological: Negative for dizziness, weakness, numbness, headaches, disturbances in coordination and coordination difficulties.   Psychiatric/Behavioral: The patient is not nervous/anxious.          Objective:      Physical Exam  Vitals signs reviewed.   Constitutional:       General: He is awake. He is not in acute distress.     Appearance: Normal appearance. He is well-developed and well-groomed. He is obese.   HENT:      Head: Normocephalic and atraumatic.      Right Ear: Hearing, tympanic membrane, ear canal and external ear normal.      Left Ear: Hearing, tympanic membrane, ear canal and external ear normal.      Nose: Nose normal.      Mouth/Throat:      Pharynx: Uvula midline.   Eyes:      General: Lids are normal.         Right eye: No discharge.         Left eye: No discharge.      Extraocular Movements: Extraocular movements intact.      Conjunctiva/sclera: Conjunctivae normal.      Pupils: Pupils are equal, round, and reactive to light.      Comments: Wearing glasses  Mild redness noted to eyes with minimal swelling  Patient reports crusting to eye lids, cleanses them with clean moist wash cloth   Neck:      Musculoskeletal: Full passive range of motion without pain, normal range of motion and neck supple.      Vascular: No carotid bruit.      Trachea: Trachea normal.   Cardiovascular:      Rate and Rhythm: Normal rate and regular rhythm.      Pulses: Normal pulses.           Carotid pulses are 2+ on the right side and 2+ on the left side.       Radial pulses are 2+ on the right side and 2+ on the left side.        Dorsalis pedis pulses are 2+ on the right side and 2+ on the left side.      Heart sounds: Normal heart sounds, S1 normal and S2 normal. No murmur.   Pulmonary:      Effort: Pulmonary effort is normal. No respiratory distress.      Breath sounds: Normal breath sounds. No wheezing or rhonchi.   Abdominal:      General: Bowel sounds are normal. There is no distension.      Palpations: Abdomen  is soft.      Tenderness: There is no abdominal tenderness.   Musculoskeletal: Normal range of motion.      Right lower leg: No edema.      Left lower leg: No edema.   Skin:     General: Skin is warm and dry.      Capillary Refill: Capillary refill takes less than 2 seconds.      Coloration: Skin is not pale.   Neurological:      Mental Status: He is alert and oriented to person, place, and time.      Coordination: Coordination normal.      Gait: Gait normal.      Deep Tendon Reflexes: Reflexes are normal and symmetric.   Psychiatric:         Attention and Perception: Attention normal.         Mood and Affect: Mood normal.         Speech: Speech normal.         Behavior: Behavior normal. Behavior is cooperative.         Thought Content: Thought content normal.         Assessment:       1. Annual physical exam    2. Essential hypertension    3. Pruritus scroti    4. LSC (lichen simplex chronicus)    5. Blepharitis, unspecified laterality, unspecified type    6. Obesity (BMI 30.0-34.9)        Plan:       Riky was seen today for annual exam and results.    Diagnoses and all orders for this visit:    Annual physical exam    Essential hypertension    Pruritus scroti  -     desonide 0.05% (DESOWEN) 0.05 % Oint; APPLY ON SCROTUM TWICE A DAY FOR 1 TO 2 WEEKS THEN AS NEEDED FOR FLARES    LSC (lichen simplex chronicus)  -     desonide 0.05% (DESOWEN) 0.05 % Oint; APPLY ON SCROTUM TWICE A DAY FOR 1 TO 2 WEEKS THEN AS NEEDED FOR FLARES    Blepharitis, unspecified laterality, unspecified type  -     erythromycin (ROMYCIN) ophthalmic ointment; Place into both eyes every evening. for 10 days    Obesity (BMI 30.0-34.9)        - Encouraged patient to continue to eat a low salt/low fat diet and discussed importance of engaging in physical activity at least 5x/week for a minimum of 30 min/day  - Refills sent to preferred pharmacy  - Warning signs discussed  - RTC in 3 months for a follow-up with PCP, or sooner if needed

## 2020-11-17 NOTE — PATIENT INSTRUCTIONS
- tamsulosin   Tamsulosin capsules  What is this medicine?  TAMSULOSIN (cai BRAIN nain sin) is used to treat enlargement of the prostate gland in men, a condition called benign prostatic hyperplasia or BPH. It is not for use in women. It works by relaxing muscles in the prostate and bladder neck. This improves urine flow and reduces BPH symptoms.  How should I use this medicine?  Take this medicine by mouth about 30 minutes after the same meal every day. Follow the directions on the prescription label. Swallow the capsules whole with a glass of water. Do not crush, chew, or open capsules. Do not take your medicine more often than directed. Do not stop taking your medicine unless your doctor tells you to.  Talk to your pediatrician regarding the use of this medicine in children. Special care may be needed.  What side effects may I notice from receiving this medicine?  Side effects that you should report to your doctor or health care professional as soon as possible:  · allergic reactions like skin rash or itching, hives, swelling of the lips, mouth, tongue, or throat  · breathing problems  · change in vision  · feeling faint or lightheaded  · irregular heartbeat  · prolonged or painful erection  · weakness  Side effects that usually do not require medical attention (report to your doctor or health care professional if they continue or are bothersome):  · back pain  · change in sex drive or performance  · constipation, nausea or vomiting  · cough  · drowsy  · runny or stuffy nose  · trouble sleeping  What may interact with this medicine?  · cimetidine  · fluoxetine  · ketoconazole  · medicines for erectile disfunction like sildenafil, tadalafil, vardenafil  · medicines for high blood pressure  · other alpha-blockers like alfuzosin, doxazosin, phentolamine, phenoxybenzamine, prazosin, terazosin  · warfarin  What if I miss a dose?  If you miss a dose, take it as soon as you can. If it is almost time for your next dose,  take only that dose. Do not take double or extra doses. If you stop taking your medicine for several days or more, ask your doctor or health care professional what dose you should start back on.  Where should I keep my medicine?  Keep out of the reach of children.  Store at room temperature between 15 and 30 degrees C (59 and 86 degrees F). Throw away any unused medicine after the expiration date.  What should I tell my health care provider before I take this medicine?  They need to know if you have any of the following conditions:  · advanced kidney disease  · advanced liver disease  · low blood pressure  · prostate cancer  · an unusual or allergic reaction to tamsulosin, sulfa drugs, other medicines, foods, dyes, or preservatives  · pregnant or trying to get pregnant  · breast-feeding  What should I watch for while using this medicine?  Visit your doctor or health care professional for regular check ups. You will need lab work done before you start this medicine and regularly while you are taking it. Check your blood pressure as directed. Ask your health care professional what your blood pressure should be, and when you should contact him or her.  This medicine may make you feel dizzy or lightheaded. This is more likely to happen after the first dose, after an increase in dose, or during hot weather or exercise. Drinking alcohol and taking some medicines can make this worse. Do not drive, use machinery, or do anything that needs mental alertness until you know how this medicine affects you. Do not sit or stand up quickly. If you begin to feel dizzy, sit down until you feel better. These effects can decrease once your body adjusts to the medicine.  Contact your doctor or health care professional right away if you have an erection that lasts longer than 4 hours or if it becomes painful. This may be a sign of a serious problem and must be treated right away to prevent permanent damage.  If you are thinking of having  cataract surgery, tell your eye surgeon that you have taken this medicine.  NOTE:This sheet is a summary. It may not cover all possible information. If you have questions about this medicine, talk to your doctor, pharmacist, or health care provider. Copyright© 2017 [employer]

## 2020-11-19 ENCOUNTER — OCCUPATIONAL HEALTH (OUTPATIENT)
Dept: URGENT CARE | Facility: CLINIC | Age: 70
End: 2020-11-19

## 2020-11-19 VITALS — HEIGHT: 72 IN | BODY MASS INDEX: 30.07 KG/M2 | WEIGHT: 222 LBS

## 2020-11-19 DIAGNOSIS — Z02.89 ENCOUNTER FOR PHYSICAL EXAMINATION RELATED TO EMPLOYMENT: Primary | ICD-10-CM

## 2020-11-19 PROCEDURE — 93000 EKG 12-LEAD: ICD-10-PCS | Mod: S$GLB,,, | Performed by: INTERNAL MEDICINE

## 2020-11-19 PROCEDURE — 80061 LIPID PANEL: CPT | Mod: QW,S$GLB,, | Performed by: PHYSICIAN ASSISTANT

## 2020-11-19 PROCEDURE — 80053 COMPREHENSIVE METABOLIC PANEL: ICD-10-PCS | Mod: QW,S$GLB,, | Performed by: PHYSICIAN ASSISTANT

## 2020-11-19 PROCEDURE — 93000 ELECTROCARDIOGRAM COMPLETE: CPT | Mod: S$GLB,,, | Performed by: INTERNAL MEDICINE

## 2020-11-19 PROCEDURE — 85025 CBC W/ AUTO DIFFERENTIAL: ICD-10-PCS | Mod: QW,S$GLB,, | Performed by: PHYSICIAN ASSISTANT

## 2020-11-19 PROCEDURE — 80076 HEPATIC FUNCTION PANEL: CPT | Mod: S$GLB,,, | Performed by: PHYSICIAN ASSISTANT

## 2020-11-19 PROCEDURE — 92552 PURE TONE AUDIOMETRY AIR: CPT | Mod: S$GLB,,, | Performed by: PHYSICIAN ASSISTANT

## 2020-11-19 PROCEDURE — 85025 COMPLETE CBC W/AUTO DIFF WBC: CPT | Mod: QW,S$GLB,, | Performed by: PHYSICIAN ASSISTANT

## 2020-11-19 PROCEDURE — 81003 URINALYSIS AUTO W/O SCOPE: CPT | Mod: S$GLB,,, | Performed by: PHYSICIAN ASSISTANT

## 2020-11-19 PROCEDURE — 81003 PR URINALYSIS, AUTO, W/O SCOPE: ICD-10-PCS | Mod: S$GLB,,, | Performed by: PHYSICIAN ASSISTANT

## 2020-11-19 PROCEDURE — 99499 UNLISTED E&M SERVICE: CPT | Mod: S$GLB,,, | Performed by: PHYSICIAN ASSISTANT

## 2020-11-19 PROCEDURE — 80053 COMPREHEN METABOLIC PANEL: CPT | Mod: QW,S$GLB,, | Performed by: PHYSICIAN ASSISTANT

## 2020-11-19 PROCEDURE — 92283 PR COLOR VISION EXAMINATION: ICD-10-PCS | Mod: S$GLB,,, | Performed by: PHYSICIAN ASSISTANT

## 2020-11-19 PROCEDURE — 92552 AUDIOGRAM OCC MED: ICD-10-PCS | Mod: S$GLB,,, | Performed by: PHYSICIAN ASSISTANT

## 2020-11-19 PROCEDURE — 80061 LIPID PANEL: ICD-10-PCS | Mod: QW,S$GLB,, | Performed by: PHYSICIAN ASSISTANT

## 2020-11-19 PROCEDURE — 92283 EXTND COLOR VISION XM: CPT | Mod: S$GLB,,, | Performed by: PHYSICIAN ASSISTANT

## 2020-11-19 PROCEDURE — 80076 PR  HEPATIC FUNCTION PANEL: ICD-10-PCS | Mod: S$GLB,,, | Performed by: PHYSICIAN ASSISTANT

## 2020-11-19 PROCEDURE — 99499 PHYSICAL, BASIC COMPLEXITY: ICD-10-PCS | Mod: S$GLB,,, | Performed by: PHYSICIAN ASSISTANT

## 2021-01-09 ENCOUNTER — IMMUNIZATION (OUTPATIENT)
Dept: INTERNAL MEDICINE | Facility: CLINIC | Age: 71
End: 2021-01-09
Payer: COMMERCIAL

## 2021-01-09 DIAGNOSIS — Z23 NEED FOR VACCINATION: ICD-10-CM

## 2021-01-09 PROCEDURE — 91300 COVID-19, MRNA, LNP-S, PF, 30 MCG/0.3 ML DOSE VACCINE: CPT | Mod: PBBFAC | Performed by: INTERNAL MEDICINE

## 2021-01-30 ENCOUNTER — IMMUNIZATION (OUTPATIENT)
Dept: INTERNAL MEDICINE | Facility: CLINIC | Age: 71
End: 2021-01-30
Payer: COMMERCIAL

## 2021-01-30 DIAGNOSIS — Z23 NEED FOR VACCINATION: Primary | ICD-10-CM

## 2021-01-30 PROCEDURE — 91300 PR SARS-COV- 2 COVID-19 VACCINE, NO PRSV, 30MCG/0.3ML, IM: CPT | Mod: PBBFAC | Performed by: INTERNAL MEDICINE

## 2021-01-30 PROCEDURE — 0002A PR IMMUNIZ ADMIN, SARS-COV-2 COVID-19 VACC, 30MCG/0.3ML, 2ND DOSE: CPT | Mod: PBBFAC | Performed by: INTERNAL MEDICINE

## 2021-01-30 RX ADMIN — RNA INGREDIENT BNT-162B2 0.3 ML: 0.23 INJECTION, SUSPENSION INTRAMUSCULAR at 07:01

## 2021-03-22 ENCOUNTER — OFFICE VISIT (OUTPATIENT)
Dept: FAMILY MEDICINE | Facility: CLINIC | Age: 71
End: 2021-03-22
Payer: COMMERCIAL

## 2021-03-22 VITALS
OXYGEN SATURATION: 98 % | DIASTOLIC BLOOD PRESSURE: 76 MMHG | BODY MASS INDEX: 31.14 KG/M2 | HEIGHT: 72 IN | WEIGHT: 229.94 LBS | SYSTOLIC BLOOD PRESSURE: 118 MMHG | HEART RATE: 77 BPM | TEMPERATURE: 97 F

## 2021-03-22 DIAGNOSIS — I10 ESSENTIAL HYPERTENSION: Primary | ICD-10-CM

## 2021-03-22 PROCEDURE — 99999 PR PBB SHADOW E&M-EST. PATIENT-LVL III: CPT | Mod: PBBFAC,,, | Performed by: FAMILY MEDICINE

## 2021-03-22 PROCEDURE — 3074F PR MOST RECENT SYSTOLIC BLOOD PRESSURE < 130 MM HG: ICD-10-PCS | Mod: CPTII,S$GLB,, | Performed by: FAMILY MEDICINE

## 2021-03-22 PROCEDURE — 1126F AMNT PAIN NOTED NONE PRSNT: CPT | Mod: S$GLB,,, | Performed by: FAMILY MEDICINE

## 2021-03-22 PROCEDURE — 3288F PR FALLS RISK ASSESSMENT DOCUMENTED: ICD-10-PCS | Mod: CPTII,S$GLB,, | Performed by: FAMILY MEDICINE

## 2021-03-22 PROCEDURE — 1126F PR PAIN SEVERITY QUANTIFIED, NO PAIN PRESENT: ICD-10-PCS | Mod: S$GLB,,, | Performed by: FAMILY MEDICINE

## 2021-03-22 PROCEDURE — 3008F BODY MASS INDEX DOCD: CPT | Mod: CPTII,S$GLB,, | Performed by: FAMILY MEDICINE

## 2021-03-22 PROCEDURE — 1101F PR PT FALLS ASSESS DOC 0-1 FALLS W/OUT INJ PAST YR: ICD-10-PCS | Mod: CPTII,S$GLB,, | Performed by: FAMILY MEDICINE

## 2021-03-22 PROCEDURE — 1159F PR MEDICATION LIST DOCUMENTED IN MEDICAL RECORD: ICD-10-PCS | Mod: S$GLB,,, | Performed by: FAMILY MEDICINE

## 2021-03-22 PROCEDURE — 3008F PR BODY MASS INDEX (BMI) DOCUMENTED: ICD-10-PCS | Mod: CPTII,S$GLB,, | Performed by: FAMILY MEDICINE

## 2021-03-22 PROCEDURE — 99214 PR OFFICE/OUTPT VISIT, EST, LEVL IV, 30-39 MIN: ICD-10-PCS | Mod: S$GLB,,, | Performed by: FAMILY MEDICINE

## 2021-03-22 PROCEDURE — 3078F DIAST BP <80 MM HG: CPT | Mod: CPTII,S$GLB,, | Performed by: FAMILY MEDICINE

## 2021-03-22 PROCEDURE — 3078F PR MOST RECENT DIASTOLIC BLOOD PRESSURE < 80 MM HG: ICD-10-PCS | Mod: CPTII,S$GLB,, | Performed by: FAMILY MEDICINE

## 2021-03-22 PROCEDURE — 99999 PR PBB SHADOW E&M-EST. PATIENT-LVL III: ICD-10-PCS | Mod: PBBFAC,,, | Performed by: FAMILY MEDICINE

## 2021-03-22 PROCEDURE — 99214 OFFICE O/P EST MOD 30 MIN: CPT | Mod: S$GLB,,, | Performed by: FAMILY MEDICINE

## 2021-03-22 PROCEDURE — 1159F MED LIST DOCD IN RCRD: CPT | Mod: S$GLB,,, | Performed by: FAMILY MEDICINE

## 2021-03-22 PROCEDURE — 3074F SYST BP LT 130 MM HG: CPT | Mod: CPTII,S$GLB,, | Performed by: FAMILY MEDICINE

## 2021-03-22 PROCEDURE — 1101F PT FALLS ASSESS-DOCD LE1/YR: CPT | Mod: CPTII,S$GLB,, | Performed by: FAMILY MEDICINE

## 2021-03-22 PROCEDURE — 3288F FALL RISK ASSESSMENT DOCD: CPT | Mod: CPTII,S$GLB,, | Performed by: FAMILY MEDICINE

## 2021-06-16 ENCOUNTER — PATIENT MESSAGE (OUTPATIENT)
Dept: FAMILY MEDICINE | Facility: CLINIC | Age: 71
End: 2021-06-16

## 2021-06-17 ENCOUNTER — TELEPHONE (OUTPATIENT)
Dept: FAMILY MEDICINE | Facility: CLINIC | Age: 71
End: 2021-06-17

## 2021-06-17 DIAGNOSIS — Z12.11 SCREEN FOR COLON CANCER: Primary | ICD-10-CM

## 2021-06-18 ENCOUNTER — TELEPHONE (OUTPATIENT)
Dept: FAMILY MEDICINE | Facility: CLINIC | Age: 71
End: 2021-06-18

## 2021-06-18 DIAGNOSIS — Z12.11 SCREEN FOR COLON CANCER: Primary | ICD-10-CM

## 2021-06-18 DIAGNOSIS — Z12.11 SPECIAL SCREENING FOR MALIGNANT NEOPLASMS, COLON: Primary | ICD-10-CM

## 2021-06-18 RX ORDER — POLYETHYLENE GLYCOL 3350, SODIUM SULFATE ANHYDROUS, SODIUM BICARBONATE, SODIUM CHLORIDE, POTASSIUM CHLORIDE 236; 22.74; 6.74; 5.86; 2.97 G/4L; G/4L; G/4L; G/4L; G/4L
4 POWDER, FOR SOLUTION ORAL ONCE
Qty: 4000 ML | Refills: 0 | Status: SHIPPED | OUTPATIENT
Start: 2021-06-18 | End: 2021-06-18

## 2021-07-24 ENCOUNTER — HOSPITAL ENCOUNTER (EMERGENCY)
Facility: HOSPITAL | Age: 71
Discharge: HOME | End: 2021-07-24
Attending: EMERGENCY MEDICINE
Payer: COMMERCIAL

## 2021-07-24 VITALS
DIASTOLIC BLOOD PRESSURE: 64 MMHG | HEIGHT: 72 IN | HEART RATE: 94 BPM | TEMPERATURE: 99 F | BODY MASS INDEX: 31.83 KG/M2 | RESPIRATION RATE: 18 BRPM | WEIGHT: 235 LBS | SYSTOLIC BLOOD PRESSURE: 142 MMHG | OXYGEN SATURATION: 98 %

## 2021-07-24 DIAGNOSIS — R09.82 POST-NASAL DRIP: ICD-10-CM

## 2021-07-24 DIAGNOSIS — R05.9 COUGH: Primary | ICD-10-CM

## 2021-07-24 LAB
CTP QC/QA: YES
SARS-COV-2 RDRP RESP QL NAA+PROBE: NEGATIVE

## 2021-07-24 PROCEDURE — 99283 EMERGENCY DEPT VISIT LOW MDM: CPT

## 2021-07-24 PROCEDURE — 25000003 PHARM REV CODE 250: Performed by: PHYSICIAN ASSISTANT

## 2021-07-24 PROCEDURE — 99284 PR EMERGENCY DEPT VISIT,LEVEL IV: ICD-10-PCS | Mod: CS,,, | Performed by: PHYSICIAN ASSISTANT

## 2021-07-24 PROCEDURE — U0002 COVID-19 LAB TEST NON-CDC: HCPCS | Performed by: EMERGENCY MEDICINE

## 2021-07-24 PROCEDURE — 99284 EMERGENCY DEPT VISIT MOD MDM: CPT | Mod: CS,,, | Performed by: PHYSICIAN ASSISTANT

## 2021-07-24 RX ORDER — FEXOFENADINE HCL 60 MG
60 TABLET ORAL DAILY
COMMUNITY
End: 2021-12-08

## 2021-07-24 RX ORDER — ACETAMINOPHEN 500 MG
1000 TABLET ORAL
Status: COMPLETED | OUTPATIENT
Start: 2021-07-24 | End: 2021-07-24

## 2021-07-24 RX ORDER — LORATADINE 10 MG/1
10 TABLET ORAL DAILY
COMMUNITY
End: 2021-12-08

## 2021-07-24 RX ADMIN — ACETAMINOPHEN 1000 MG: 500 TABLET ORAL at 10:07

## 2021-07-29 ENCOUNTER — HOSPITAL ENCOUNTER (OUTPATIENT)
Facility: HOSPITAL | Age: 71
Discharge: HOME | End: 2021-07-29
Attending: STUDENT IN AN ORGANIZED HEALTH CARE EDUCATION/TRAINING PROGRAM | Admitting: STUDENT IN AN ORGANIZED HEALTH CARE EDUCATION/TRAINING PROGRAM
Payer: COMMERCIAL

## 2021-07-29 ENCOUNTER — ANESTHESIA EVENT (OUTPATIENT)
Dept: ENDOSCOPY | Facility: HOSPITAL | Age: 71
End: 2021-07-29
Payer: COMMERCIAL

## 2021-07-29 ENCOUNTER — ANESTHESIA (OUTPATIENT)
Dept: ENDOSCOPY | Facility: HOSPITAL | Age: 71
End: 2021-07-29
Payer: COMMERCIAL

## 2021-07-29 VITALS
HEART RATE: 64 BPM | RESPIRATION RATE: 14 BRPM | HEIGHT: 72 IN | DIASTOLIC BLOOD PRESSURE: 64 MMHG | SYSTOLIC BLOOD PRESSURE: 122 MMHG | OXYGEN SATURATION: 97 % | WEIGHT: 235 LBS | BODY MASS INDEX: 31.83 KG/M2 | TEMPERATURE: 98 F

## 2021-07-29 DIAGNOSIS — Z12.11 ENCOUNTER FOR SCREENING COLONOSCOPY: Primary | ICD-10-CM

## 2021-07-29 PROCEDURE — 45385 COLONOSCOPY W/LESION REMOVAL: CPT | Mod: 33,,, | Performed by: STUDENT IN AN ORGANIZED HEALTH CARE EDUCATION/TRAINING PROGRAM

## 2021-07-29 PROCEDURE — E9220 PRA ENDO ANESTHESIA: ICD-10-PCS | Mod: 33,,, | Performed by: NURSE ANESTHETIST, CERTIFIED REGISTERED

## 2021-07-29 PROCEDURE — 37000008 HC ANESTHESIA 1ST 15 MINUTES: Performed by: STUDENT IN AN ORGANIZED HEALTH CARE EDUCATION/TRAINING PROGRAM

## 2021-07-29 PROCEDURE — 88305 TISSUE EXAM BY PATHOLOGIST: CPT | Mod: 26,,, | Performed by: PATHOLOGY

## 2021-07-29 PROCEDURE — 25000003 PHARM REV CODE 250: Performed by: STUDENT IN AN ORGANIZED HEALTH CARE EDUCATION/TRAINING PROGRAM

## 2021-07-29 PROCEDURE — 27201089 HC SNARE, DISP (ANY): Performed by: STUDENT IN AN ORGANIZED HEALTH CARE EDUCATION/TRAINING PROGRAM

## 2021-07-29 PROCEDURE — 37000009 HC ANESTHESIA EA ADD 15 MINS: Performed by: STUDENT IN AN ORGANIZED HEALTH CARE EDUCATION/TRAINING PROGRAM

## 2021-07-29 PROCEDURE — 88305 TISSUE EXAM BY PATHOLOGIST: CPT | Performed by: PATHOLOGY

## 2021-07-29 PROCEDURE — 45385 COLONOSCOPY W/LESION REMOVAL: CPT | Performed by: STUDENT IN AN ORGANIZED HEALTH CARE EDUCATION/TRAINING PROGRAM

## 2021-07-29 PROCEDURE — 45385 PR COLONOSCOPY,REMV LESN,SNARE: ICD-10-PCS | Mod: 33,,, | Performed by: STUDENT IN AN ORGANIZED HEALTH CARE EDUCATION/TRAINING PROGRAM

## 2021-07-29 PROCEDURE — E9220 PRA ENDO ANESTHESIA: HCPCS | Mod: 33,,, | Performed by: NURSE ANESTHETIST, CERTIFIED REGISTERED

## 2021-07-29 PROCEDURE — 88305 TISSUE EXAM BY PATHOLOGIST: ICD-10-PCS | Mod: 26,,, | Performed by: PATHOLOGY

## 2021-07-29 PROCEDURE — 63600175 PHARM REV CODE 636 W HCPCS: Performed by: NURSE ANESTHETIST, CERTIFIED REGISTERED

## 2021-07-29 RX ORDER — PROPOFOL 10 MG/ML
VIAL (ML) INTRAVENOUS
Status: DISCONTINUED | OUTPATIENT
Start: 2021-07-29 | End: 2021-07-29

## 2021-07-29 RX ORDER — LIDOCAINE HCL/PF 100 MG/5ML
SYRINGE (ML) INTRAVENOUS
Status: DISCONTINUED | OUTPATIENT
Start: 2021-07-29 | End: 2021-07-29

## 2021-07-29 RX ORDER — SODIUM CHLORIDE 9 MG/ML
INJECTION, SOLUTION INTRAVENOUS CONTINUOUS
Status: DISCONTINUED | OUTPATIENT
Start: 2021-07-29 | End: 2021-07-29 | Stop reason: HOSPADM

## 2021-07-29 RX ADMIN — PROPOFOL 100 MG: 10 INJECTION, EMULSION INTRAVENOUS at 08:07

## 2021-07-29 RX ADMIN — PROPOFOL 30 MG: 10 INJECTION, EMULSION INTRAVENOUS at 08:07

## 2021-07-29 RX ADMIN — Medication 100 MG: at 08:07

## 2021-07-29 RX ADMIN — PROPOFOL 30 MG: 10 INJECTION, EMULSION INTRAVENOUS at 09:07

## 2021-07-29 RX ADMIN — SODIUM CHLORIDE: 0.9 INJECTION, SOLUTION INTRAVENOUS at 08:07

## 2021-07-29 RX ADMIN — PROPOFOL 40 MG: 10 INJECTION, EMULSION INTRAVENOUS at 08:07

## 2021-08-01 ENCOUNTER — PATIENT MESSAGE (OUTPATIENT)
Dept: FAMILY MEDICINE | Facility: CLINIC | Age: 71
End: 2021-08-01

## 2021-08-02 ENCOUNTER — LAB VISIT (OUTPATIENT)
Dept: PRIMARY CARE CLINIC | Facility: CLINIC | Age: 71
End: 2021-08-02
Payer: COMMERCIAL

## 2021-08-02 DIAGNOSIS — R05.9 COUGH: Primary | ICD-10-CM

## 2021-08-02 DIAGNOSIS — R05.9 COUGH: ICD-10-CM

## 2021-08-02 PROCEDURE — U0003 INFECTIOUS AGENT DETECTION BY NUCLEIC ACID (DNA OR RNA); SEVERE ACUTE RESPIRATORY SYNDROME CORONAVIRUS 2 (SARS-COV-2) (CORONAVIRUS DISEASE [COVID-19]), AMPLIFIED PROBE TECHNIQUE, MAKING USE OF HIGH THROUGHPUT TECHNOLOGIES AS DESCRIBED BY CMS-2020-01-R: HCPCS | Performed by: INTERNAL MEDICINE

## 2021-08-02 PROCEDURE — U0005 INFEC AGEN DETEC AMPLI PROBE: HCPCS | Performed by: INTERNAL MEDICINE

## 2021-08-04 LAB
SARS-COV-2 RNA RESP QL NAA+PROBE: NOT DETECTED
SARS-COV-2- CYCLE NUMBER: -1

## 2021-08-06 LAB
FINAL PATHOLOGIC DIAGNOSIS: NORMAL
Lab: NORMAL

## 2021-08-14 ENCOUNTER — PATIENT MESSAGE (OUTPATIENT)
Dept: FAMILY MEDICINE | Facility: CLINIC | Age: 71
End: 2021-08-14

## 2021-08-17 ENCOUNTER — TELEPHONE (OUTPATIENT)
Dept: FAMILY MEDICINE | Facility: CLINIC | Age: 71
End: 2021-08-17

## 2021-08-17 DIAGNOSIS — I10 ESSENTIAL HYPERTENSION: Primary | ICD-10-CM

## 2021-09-17 ENCOUNTER — LAB VISIT (OUTPATIENT)
Dept: LAB | Facility: HOSPITAL | Age: 71
End: 2021-09-17
Attending: FAMILY MEDICINE
Payer: COMMERCIAL

## 2021-09-17 DIAGNOSIS — I10 ESSENTIAL HYPERTENSION: ICD-10-CM

## 2021-09-17 LAB
ALBUMIN SERPL BCP-MCNC: 3.6 G/DL (ref 3.5–5.2)
ALP SERPL-CCNC: 85 U/L (ref 55–135)
ALT SERPL W/O P-5'-P-CCNC: 17 U/L (ref 10–44)
ANION GAP SERPL CALC-SCNC: 10 MMOL/L (ref 8–16)
AST SERPL-CCNC: 17 U/L (ref 10–40)
BASOPHILS # BLD AUTO: 0.08 K/UL (ref 0–0.2)
BASOPHILS NFR BLD: 1.1 % (ref 0–1.9)
BILIRUB SERPL-MCNC: 0.4 MG/DL (ref 0.1–1)
BUN SERPL-MCNC: 29 MG/DL (ref 8–23)
CALCIUM SERPL-MCNC: 9.8 MG/DL (ref 8.7–10.5)
CHLORIDE SERPL-SCNC: 105 MMOL/L (ref 95–110)
CHOLEST SERPL-MCNC: 188 MG/DL (ref 120–199)
CHOLEST/HDLC SERPL: 4.2 {RATIO} (ref 2–5)
CO2 SERPL-SCNC: 23 MMOL/L (ref 23–29)
CREAT SERPL-MCNC: 1 MG/DL (ref 0.5–1.4)
DIFFERENTIAL METHOD: ABNORMAL
EOSINOPHIL # BLD AUTO: 0.2 K/UL (ref 0–0.5)
EOSINOPHIL NFR BLD: 2.4 % (ref 0–8)
ERYTHROCYTE [DISTWIDTH] IN BLOOD BY AUTOMATED COUNT: 13.2 % (ref 11.5–14.5)
EST. GFR  (AFRICAN AMERICAN): >60 ML/MIN/1.73 M^2
EST. GFR  (NON AFRICAN AMERICAN): >60 ML/MIN/1.73 M^2
GLUCOSE SERPL-MCNC: 103 MG/DL (ref 70–110)
HCT VFR BLD AUTO: 40.3 % (ref 40–54)
HDLC SERPL-MCNC: 45 MG/DL (ref 40–75)
HDLC SERPL: 23.9 % (ref 20–50)
HGB BLD-MCNC: 13.4 G/DL (ref 14–18)
IMM GRANULOCYTES # BLD AUTO: 0.02 K/UL (ref 0–0.04)
IMM GRANULOCYTES NFR BLD AUTO: 0.3 % (ref 0–0.5)
LDLC SERPL CALC-MCNC: 124 MG/DL (ref 63–159)
LYMPHOCYTES # BLD AUTO: 2.4 K/UL (ref 1–4.8)
LYMPHOCYTES NFR BLD: 31.6 % (ref 18–48)
MCH RBC QN AUTO: 30.4 PG (ref 27–31)
MCHC RBC AUTO-ENTMCNC: 33.3 G/DL (ref 32–36)
MCV RBC AUTO: 91 FL (ref 82–98)
MONOCYTES # BLD AUTO: 0.6 K/UL (ref 0.3–1)
MONOCYTES NFR BLD: 7.6 % (ref 4–15)
NEUTROPHILS # BLD AUTO: 4.3 K/UL (ref 1.8–7.7)
NEUTROPHILS NFR BLD: 57 % (ref 38–73)
NONHDLC SERPL-MCNC: 143 MG/DL
NRBC BLD-RTO: 0 /100 WBC
PLATELET # BLD AUTO: 304 K/UL (ref 150–450)
PMV BLD AUTO: 11.5 FL (ref 9.2–12.9)
POTASSIUM SERPL-SCNC: 4.8 MMOL/L (ref 3.5–5.1)
PROT SERPL-MCNC: 7.2 G/DL (ref 6–8.4)
RBC # BLD AUTO: 4.41 M/UL (ref 4.6–6.2)
SODIUM SERPL-SCNC: 138 MMOL/L (ref 136–145)
TRIGL SERPL-MCNC: 95 MG/DL (ref 30–150)
TSH SERPL DL<=0.005 MIU/L-ACNC: 3.23 UIU/ML (ref 0.4–4)
WBC # BLD AUTO: 7.59 K/UL (ref 3.9–12.7)

## 2021-09-17 PROCEDURE — 36415 COLL VENOUS BLD VENIPUNCTURE: CPT | Mod: PO | Performed by: FAMILY MEDICINE

## 2021-09-17 PROCEDURE — 80061 LIPID PANEL: CPT | Performed by: FAMILY MEDICINE

## 2021-09-17 PROCEDURE — 85025 COMPLETE CBC W/AUTO DIFF WBC: CPT | Performed by: FAMILY MEDICINE

## 2021-09-17 PROCEDURE — 80053 COMPREHEN METABOLIC PANEL: CPT | Performed by: FAMILY MEDICINE

## 2021-09-17 PROCEDURE — 84443 ASSAY THYROID STIM HORMONE: CPT | Performed by: FAMILY MEDICINE

## 2021-09-17 PROCEDURE — 83036 HEMOGLOBIN GLYCOSYLATED A1C: CPT | Performed by: FAMILY MEDICINE

## 2021-09-18 LAB
ESTIMATED AVG GLUCOSE: 117 MG/DL (ref 68–131)
HBA1C MFR BLD: 5.7 % (ref 4–5.6)

## 2021-09-28 ENCOUNTER — IMMUNIZATION (OUTPATIENT)
Dept: INTERNAL MEDICINE | Facility: CLINIC | Age: 71
End: 2021-09-28
Payer: COMMERCIAL

## 2021-09-28 DIAGNOSIS — Z23 NEED FOR VACCINATION: Primary | ICD-10-CM

## 2021-09-28 PROCEDURE — 91300 COVID-19, MRNA, LNP-S, PF, 30 MCG/0.3 ML DOSE VACCINE: CPT | Mod: PBBFAC | Performed by: INTERNAL MEDICINE

## 2021-09-28 PROCEDURE — 0003A COVID-19, MRNA, LNP-S, PF, 30 MCG/0.3 ML DOSE VACCINE: CPT | Mod: CV19,PBBFAC | Performed by: INTERNAL MEDICINE

## 2021-11-04 ENCOUNTER — PATIENT MESSAGE (OUTPATIENT)
Dept: FAMILY MEDICINE | Facility: CLINIC | Age: 71
End: 2021-11-04
Payer: MEDICARE

## 2021-11-16 ENCOUNTER — PATIENT OUTREACH (OUTPATIENT)
Dept: ADMINISTRATIVE | Facility: OTHER | Age: 71
End: 2021-11-16
Payer: MEDICARE

## 2021-11-17 ENCOUNTER — OFFICE VISIT (OUTPATIENT)
Dept: OTOLARYNGOLOGY | Facility: CLINIC | Age: 71
End: 2021-11-17
Payer: COMMERCIAL

## 2021-11-17 VITALS
SYSTOLIC BLOOD PRESSURE: 145 MMHG | WEIGHT: 223.69 LBS | HEART RATE: 81 BPM | DIASTOLIC BLOOD PRESSURE: 81 MMHG | HEIGHT: 72 IN | BODY MASS INDEX: 30.3 KG/M2

## 2021-11-17 DIAGNOSIS — H61.23 BILATERAL IMPACTED CERUMEN: ICD-10-CM

## 2021-11-17 DIAGNOSIS — H91.93 BILATERAL HEARING LOSS, UNSPECIFIED HEARING LOSS TYPE: Primary | ICD-10-CM

## 2021-11-17 PROCEDURE — 1157F PR ADVANCE CARE PLAN OR EQUIV PRESENT IN MEDICAL RECORD: ICD-10-PCS | Mod: CPTII,S$GLB,, | Performed by: OTOLARYNGOLOGY

## 2021-11-17 PROCEDURE — 3044F HG A1C LEVEL LT 7.0%: CPT | Mod: CPTII,S$GLB,, | Performed by: OTOLARYNGOLOGY

## 2021-11-17 PROCEDURE — 3044F PR MOST RECENT HEMOGLOBIN A1C LEVEL <7.0%: ICD-10-PCS | Mod: CPTII,S$GLB,, | Performed by: OTOLARYNGOLOGY

## 2021-11-17 PROCEDURE — 1160F PR REVIEW ALL MEDS BY PRESCRIBER/CLIN PHARMACIST DOCUMENTED: ICD-10-PCS | Mod: CPTII,S$GLB,, | Performed by: OTOLARYNGOLOGY

## 2021-11-17 PROCEDURE — 3077F PR MOST RECENT SYSTOLIC BLOOD PRESSURE >= 140 MM HG: ICD-10-PCS | Mod: CPTII,S$GLB,, | Performed by: OTOLARYNGOLOGY

## 2021-11-17 PROCEDURE — 1100F PR PT FALLS ASSESS DOC 2+ FALLS/FALL W/INJURY/YR: ICD-10-PCS | Mod: CPTII,S$GLB,, | Performed by: OTOLARYNGOLOGY

## 2021-11-17 PROCEDURE — 99203 OFFICE O/P NEW LOW 30 MIN: CPT | Mod: S$GLB,,, | Performed by: OTOLARYNGOLOGY

## 2021-11-17 PROCEDURE — 99203 PR OFFICE/OUTPT VISIT, NEW, LEVL III, 30-44 MIN: ICD-10-PCS | Mod: S$GLB,,, | Performed by: OTOLARYNGOLOGY

## 2021-11-17 PROCEDURE — 1157F ADVNC CARE PLAN IN RCRD: CPT | Mod: CPTII,S$GLB,, | Performed by: OTOLARYNGOLOGY

## 2021-11-17 PROCEDURE — 3077F SYST BP >= 140 MM HG: CPT | Mod: CPTII,S$GLB,, | Performed by: OTOLARYNGOLOGY

## 2021-11-17 PROCEDURE — 3288F PR FALLS RISK ASSESSMENT DOCUMENTED: ICD-10-PCS | Mod: CPTII,S$GLB,, | Performed by: OTOLARYNGOLOGY

## 2021-11-17 PROCEDURE — 1126F PR PAIN SEVERITY QUANTIFIED, NO PAIN PRESENT: ICD-10-PCS | Mod: CPTII,S$GLB,, | Performed by: OTOLARYNGOLOGY

## 2021-11-17 PROCEDURE — 4010F PR ACE/ARB THEARPY RXD/TAKEN: ICD-10-PCS | Mod: CPTII,S$GLB,, | Performed by: OTOLARYNGOLOGY

## 2021-11-17 PROCEDURE — 1159F PR MEDICATION LIST DOCUMENTED IN MEDICAL RECORD: ICD-10-PCS | Mod: CPTII,S$GLB,, | Performed by: OTOLARYNGOLOGY

## 2021-11-17 PROCEDURE — 1100F PTFALLS ASSESS-DOCD GE2>/YR: CPT | Mod: CPTII,S$GLB,, | Performed by: OTOLARYNGOLOGY

## 2021-11-17 PROCEDURE — 3079F PR MOST RECENT DIASTOLIC BLOOD PRESSURE 80-89 MM HG: ICD-10-PCS | Mod: CPTII,S$GLB,, | Performed by: OTOLARYNGOLOGY

## 2021-11-17 PROCEDURE — 1160F RVW MEDS BY RX/DR IN RCRD: CPT | Mod: CPTII,S$GLB,, | Performed by: OTOLARYNGOLOGY

## 2021-11-17 PROCEDURE — 1126F AMNT PAIN NOTED NONE PRSNT: CPT | Mod: CPTII,S$GLB,, | Performed by: OTOLARYNGOLOGY

## 2021-11-17 PROCEDURE — 99999 PR PBB SHADOW E&M-EST. PATIENT-LVL III: CPT | Mod: PBBFAC,,, | Performed by: OTOLARYNGOLOGY

## 2021-11-17 PROCEDURE — 99999 PR PBB SHADOW E&M-EST. PATIENT-LVL III: ICD-10-PCS | Mod: PBBFAC,,, | Performed by: OTOLARYNGOLOGY

## 2021-11-17 PROCEDURE — 4010F ACE/ARB THERAPY RXD/TAKEN: CPT | Mod: CPTII,S$GLB,, | Performed by: OTOLARYNGOLOGY

## 2021-11-17 PROCEDURE — 1159F MED LIST DOCD IN RCRD: CPT | Mod: CPTII,S$GLB,, | Performed by: OTOLARYNGOLOGY

## 2021-11-17 PROCEDURE — 3288F FALL RISK ASSESSMENT DOCD: CPT | Mod: CPTII,S$GLB,, | Performed by: OTOLARYNGOLOGY

## 2021-11-17 PROCEDURE — 3008F BODY MASS INDEX DOCD: CPT | Mod: CPTII,S$GLB,, | Performed by: OTOLARYNGOLOGY

## 2021-11-17 PROCEDURE — 3008F PR BODY MASS INDEX (BMI) DOCUMENTED: ICD-10-PCS | Mod: CPTII,S$GLB,, | Performed by: OTOLARYNGOLOGY

## 2021-11-17 PROCEDURE — 3079F DIAST BP 80-89 MM HG: CPT | Mod: CPTII,S$GLB,, | Performed by: OTOLARYNGOLOGY

## 2021-12-08 ENCOUNTER — OFFICE VISIT (OUTPATIENT)
Dept: FAMILY MEDICINE | Facility: CLINIC | Age: 71
End: 2021-12-08
Payer: COMMERCIAL

## 2021-12-08 VITALS
DIASTOLIC BLOOD PRESSURE: 70 MMHG | HEIGHT: 72 IN | BODY MASS INDEX: 31.03 KG/M2 | SYSTOLIC BLOOD PRESSURE: 136 MMHG | WEIGHT: 229.06 LBS | OXYGEN SATURATION: 97 % | HEART RATE: 95 BPM

## 2021-12-08 DIAGNOSIS — J84.10 LUNG GRANULOMA: ICD-10-CM

## 2021-12-08 DIAGNOSIS — I10 ESSENTIAL HYPERTENSION: Primary | ICD-10-CM

## 2021-12-08 PROCEDURE — 99214 OFFICE O/P EST MOD 30 MIN: CPT | Mod: S$GLB,,, | Performed by: FAMILY MEDICINE

## 2021-12-08 PROCEDURE — 4010F ACE/ARB THERAPY RXD/TAKEN: CPT | Mod: CPTII,S$GLB,, | Performed by: FAMILY MEDICINE

## 2021-12-08 PROCEDURE — 99214 PR OFFICE/OUTPT VISIT, EST, LEVL IV, 30-39 MIN: ICD-10-PCS | Mod: S$GLB,,, | Performed by: FAMILY MEDICINE

## 2021-12-08 PROCEDURE — 1157F PR ADVANCE CARE PLAN OR EQUIV PRESENT IN MEDICAL RECORD: ICD-10-PCS | Mod: CPTII,S$GLB,, | Performed by: FAMILY MEDICINE

## 2021-12-08 PROCEDURE — 4010F PR ACE/ARB THEARPY RXD/TAKEN: ICD-10-PCS | Mod: CPTII,S$GLB,, | Performed by: FAMILY MEDICINE

## 2021-12-08 PROCEDURE — 99999 PR PBB SHADOW E&M-EST. PATIENT-LVL III: ICD-10-PCS | Mod: PBBFAC,,, | Performed by: FAMILY MEDICINE

## 2021-12-08 PROCEDURE — 1157F ADVNC CARE PLAN IN RCRD: CPT | Mod: CPTII,S$GLB,, | Performed by: FAMILY MEDICINE

## 2021-12-08 PROCEDURE — 99999 PR PBB SHADOW E&M-EST. PATIENT-LVL III: CPT | Mod: PBBFAC,,, | Performed by: FAMILY MEDICINE

## 2021-12-09 ENCOUNTER — LAB VISIT (OUTPATIENT)
Dept: LAB | Facility: HOSPITAL | Age: 71
End: 2021-12-09
Attending: FAMILY MEDICINE
Payer: COMMERCIAL

## 2021-12-09 DIAGNOSIS — I10 ESSENTIAL HYPERTENSION: ICD-10-CM

## 2021-12-09 LAB
ALBUMIN SERPL BCP-MCNC: 3.6 G/DL (ref 3.5–5.2)
ALP SERPL-CCNC: 79 U/L (ref 55–135)
ALT SERPL W/O P-5'-P-CCNC: 25 U/L (ref 10–44)
ANION GAP SERPL CALC-SCNC: 11 MMOL/L (ref 8–16)
AST SERPL-CCNC: 22 U/L (ref 10–40)
BASOPHILS # BLD AUTO: 0.08 K/UL (ref 0–0.2)
BASOPHILS NFR BLD: 1 % (ref 0–1.9)
BILIRUB SERPL-MCNC: 0.4 MG/DL (ref 0.1–1)
BUN SERPL-MCNC: 16 MG/DL (ref 8–23)
CALCIUM SERPL-MCNC: 9.2 MG/DL (ref 8.7–10.5)
CHLORIDE SERPL-SCNC: 104 MMOL/L (ref 95–110)
CHOLEST SERPL-MCNC: 220 MG/DL (ref 120–199)
CHOLEST/HDLC SERPL: 4.1 {RATIO} (ref 2–5)
CO2 SERPL-SCNC: 23 MMOL/L (ref 23–29)
CREAT SERPL-MCNC: 0.9 MG/DL (ref 0.5–1.4)
DIFFERENTIAL METHOD: NORMAL
EOSINOPHIL # BLD AUTO: 0.2 K/UL (ref 0–0.5)
EOSINOPHIL NFR BLD: 2.7 % (ref 0–8)
ERYTHROCYTE [DISTWIDTH] IN BLOOD BY AUTOMATED COUNT: 14 % (ref 11.5–14.5)
EST. GFR  (AFRICAN AMERICAN): >60 ML/MIN/1.73 M^2
EST. GFR  (NON AFRICAN AMERICAN): >60 ML/MIN/1.73 M^2
GLUCOSE SERPL-MCNC: 82 MG/DL (ref 70–110)
HCT VFR BLD AUTO: 43.2 % (ref 40–54)
HDLC SERPL-MCNC: 54 MG/DL (ref 40–75)
HDLC SERPL: 24.5 % (ref 20–50)
HGB BLD-MCNC: 14.3 G/DL (ref 14–18)
IMM GRANULOCYTES # BLD AUTO: 0.03 K/UL (ref 0–0.04)
IMM GRANULOCYTES NFR BLD AUTO: 0.4 % (ref 0–0.5)
LDLC SERPL CALC-MCNC: 120.6 MG/DL (ref 63–159)
LYMPHOCYTES # BLD AUTO: 2.2 K/UL (ref 1–4.8)
LYMPHOCYTES NFR BLD: 28.1 % (ref 18–48)
MCH RBC QN AUTO: 30.3 PG (ref 27–31)
MCHC RBC AUTO-ENTMCNC: 33.1 G/DL (ref 32–36)
MCV RBC AUTO: 92 FL (ref 82–98)
MONOCYTES # BLD AUTO: 0.6 K/UL (ref 0.3–1)
MONOCYTES NFR BLD: 7.2 % (ref 4–15)
NEUTROPHILS # BLD AUTO: 4.7 K/UL (ref 1.8–7.7)
NEUTROPHILS NFR BLD: 60.6 % (ref 38–73)
NONHDLC SERPL-MCNC: 166 MG/DL
NRBC BLD-RTO: 0 /100 WBC
PLATELET # BLD AUTO: 252 K/UL (ref 150–450)
PMV BLD AUTO: 11.3 FL (ref 9.2–12.9)
POTASSIUM SERPL-SCNC: 4.9 MMOL/L (ref 3.5–5.1)
PROT SERPL-MCNC: 7 G/DL (ref 6–8.4)
RBC # BLD AUTO: 4.72 M/UL (ref 4.6–6.2)
SODIUM SERPL-SCNC: 138 MMOL/L (ref 136–145)
TRIGL SERPL-MCNC: 227 MG/DL (ref 30–150)
TSH SERPL DL<=0.005 MIU/L-ACNC: 3.76 UIU/ML (ref 0.4–4)
WBC # BLD AUTO: 7.75 K/UL (ref 3.9–12.7)

## 2021-12-09 PROCEDURE — 36415 COLL VENOUS BLD VENIPUNCTURE: CPT | Mod: PO | Performed by: FAMILY MEDICINE

## 2021-12-09 PROCEDURE — 80061 LIPID PANEL: CPT | Performed by: FAMILY MEDICINE

## 2021-12-09 PROCEDURE — 80053 COMPREHEN METABOLIC PANEL: CPT | Performed by: FAMILY MEDICINE

## 2021-12-09 PROCEDURE — 85025 COMPLETE CBC W/AUTO DIFF WBC: CPT | Performed by: FAMILY MEDICINE

## 2021-12-09 PROCEDURE — 84443 ASSAY THYROID STIM HORMONE: CPT | Performed by: FAMILY MEDICINE

## 2021-12-31 ENCOUNTER — NURSE TRIAGE (OUTPATIENT)
Dept: ADMINISTRATIVE | Facility: CLINIC | Age: 71
End: 2021-12-31
Payer: MEDICARE

## 2022-01-01 ENCOUNTER — LAB VISIT (OUTPATIENT)
Dept: PRIMARY CARE CLINIC | Facility: OTHER | Age: 72
End: 2022-01-01
Attending: INTERNAL MEDICINE
Payer: COMMERCIAL

## 2022-01-01 DIAGNOSIS — R05.9 COUGH: ICD-10-CM

## 2022-01-01 PROCEDURE — U0003 INFECTIOUS AGENT DETECTION BY NUCLEIC ACID (DNA OR RNA); SEVERE ACUTE RESPIRATORY SYNDROME CORONAVIRUS 2 (SARS-COV-2) (CORONAVIRUS DISEASE [COVID-19]), AMPLIFIED PROBE TECHNIQUE, MAKING USE OF HIGH THROUGHPUT TECHNOLOGIES AS DESCRIBED BY CMS-2020-01-R: HCPCS | Mod: ST72 | Performed by: INTERNAL MEDICINE

## 2022-01-01 NOTE — TELEPHONE ENCOUNTER
Patients daughter and wife is + for COVID. Patient is calling regarding his indications for testing. Advised as per protocol  Reason for Disposition   [1] COVID-19 EXPOSURE (Close Contact) within last 14 days AND [2] NO cough, fever, or breathing difficulty    Additional Information   Negative: Severe difficulty breathing (e.g., struggling for each breath, speaks in single words)   Negative: Bluish (or gray) lips or face now   Negative: Sounds like a life-threatening emergency to the triager   Negative: [1] Adult has symptoms of COVID-19 (fever, cough, or SOB) AND [2] lab test positive   Negative: [1] Adult has symptoms of COVID-19 (fever, cough or SOB) AND [2] major community spread where patient lives AND [3] testing not being done for mild symptoms   Negative: [1] Difficulty breathing (shortness of breath) occurs AND [2] onset > 14 days after COVID-19 EXPOSURE (Close Contact) AND [3] no major community spread   Negative: [1] Dry cough occurs AND [2] onset > 14 days after COVID-19 EXPOSURE AND [3] no major community spread   Negative: [1] Wet cough (i.e., white-yellow, yellow, green, or hipolito colored sputum) AND [2] onset > 14 days after COVID-19 EXPOSURE AND [3] no major community spread   Negative: [1] Common cold symptoms AND [2] onset > 14 days after COVID-19 EXPOSURE AND [3] no major community spread   Negative: [1] Difficulty breathing occurs AND [2] within 14 days of COVID-19 EXPOSURE (Close Contact)   Negative: Patient sounds very sick or weak to the triager   Negative: [1] Fever (or feeling feverish) OR cough AND [2] within 14 Days of COVID-19 EXPOSURE (Close Contact)   Negative: [1] Fever (or feeling feverish) OR cough occurs AND [2] within 14 days of travel from another country (international travel)   Negative: [1] Fever (or feeling feverish) OR cough occurs AND [2] within 14 days of travel from a city or area with major community spread   Negative: [1] Fever (or feeling feverish) OR  cough occurs AND [2] living in area with major community spread AND [3] testing being done in the community for symptoms   Negative: [1] Mild body aches, chills, diarrhea, headache, runny nose, or sore throat AND [2] within 14 days of COVID-19 EXPOSURE   Negative: [1] COVID-19 EXPOSURE within last 14 days AND [2] NO cough, fever, or breathing difficulty AND [3] exposed person is a healthcare worker who was NOT using all recommended personal protective equipment (i.e., a respirator-N95 mask, eye protection, gloves, and gown)    Protocols used: CORONAVIRUS (COVID-19) - EXPOSURE-A-

## 2022-01-06 ENCOUNTER — LAB VISIT (OUTPATIENT)
Dept: PRIMARY CARE CLINIC | Facility: CLINIC | Age: 72
End: 2022-01-06
Payer: COMMERCIAL

## 2022-01-06 DIAGNOSIS — Z20.822 CONTACT WITH AND (SUSPECTED) EXPOSURE TO COVID-19: ICD-10-CM

## 2022-01-06 LAB
CTP QC/QA: YES
SARS-COV-2 AG RESP QL IA.RAPID: NEGATIVE
SARS-COV-2 RNA RESP QL NAA+PROBE: NOT DETECTED
SARS-COV-2- CYCLE NUMBER: NORMAL

## 2022-01-06 PROCEDURE — 87811 SARS-COV-2 COVID19 W/OPTIC: CPT

## 2022-02-02 ENCOUNTER — PATIENT OUTREACH (OUTPATIENT)
Dept: ADMINISTRATIVE | Facility: OTHER | Age: 72
End: 2022-02-02
Payer: MEDICARE

## 2022-02-03 ENCOUNTER — OFFICE VISIT (OUTPATIENT)
Dept: OPTOMETRY | Facility: CLINIC | Age: 72
End: 2022-02-03
Payer: COMMERCIAL

## 2022-02-03 DIAGNOSIS — H52.4 HYPEROPIA WITH ASTIGMATISM AND PRESBYOPIA, BILATERAL: ICD-10-CM

## 2022-02-03 DIAGNOSIS — H10.823 ROSACEA CONJUNCTIVITIS OF BOTH EYES: ICD-10-CM

## 2022-02-03 DIAGNOSIS — H52.203 HYPEROPIA WITH ASTIGMATISM AND PRESBYOPIA, BILATERAL: ICD-10-CM

## 2022-02-03 DIAGNOSIS — H25.13 SENILE NUCLEAR SCLEROSIS, BILATERAL: ICD-10-CM

## 2022-02-03 DIAGNOSIS — L71.8 ROSACEA CONJUNCTIVITIS OF BOTH EYES: ICD-10-CM

## 2022-02-03 DIAGNOSIS — H01.00A BLEPHARITIS OF UPPER AND LOWER EYELIDS OF BOTH EYES, UNSPECIFIED TYPE: Primary | ICD-10-CM

## 2022-02-03 DIAGNOSIS — H01.00B BLEPHARITIS OF UPPER AND LOWER EYELIDS OF BOTH EYES, UNSPECIFIED TYPE: Primary | ICD-10-CM

## 2022-02-03 DIAGNOSIS — H52.03 HYPEROPIA WITH ASTIGMATISM AND PRESBYOPIA, BILATERAL: ICD-10-CM

## 2022-02-03 PROCEDURE — 1159F PR MEDICATION LIST DOCUMENTED IN MEDICAL RECORD: ICD-10-PCS | Mod: CPTII,S$GLB,, | Performed by: OPTOMETRIST

## 2022-02-03 PROCEDURE — 3288F PR FALLS RISK ASSESSMENT DOCUMENTED: ICD-10-PCS | Mod: CPTII,S$GLB,, | Performed by: OPTOMETRIST

## 2022-02-03 PROCEDURE — 99999 PR PBB SHADOW E&M-EST. PATIENT-LVL III: CPT | Mod: PBBFAC,,, | Performed by: OPTOMETRIST

## 2022-02-03 PROCEDURE — 1126F AMNT PAIN NOTED NONE PRSNT: CPT | Mod: CPTII,S$GLB,, | Performed by: OPTOMETRIST

## 2022-02-03 PROCEDURE — 1160F PR REVIEW ALL MEDS BY PRESCRIBER/CLIN PHARMACIST DOCUMENTED: ICD-10-PCS | Mod: CPTII,S$GLB,, | Performed by: OPTOMETRIST

## 2022-02-03 PROCEDURE — 1159F MED LIST DOCD IN RCRD: CPT | Mod: CPTII,S$GLB,, | Performed by: OPTOMETRIST

## 2022-02-03 PROCEDURE — 92004 COMPRE OPH EXAM NEW PT 1/>: CPT | Mod: S$GLB,,, | Performed by: OPTOMETRIST

## 2022-02-03 PROCEDURE — 1160F RVW MEDS BY RX/DR IN RCRD: CPT | Mod: CPTII,S$GLB,, | Performed by: OPTOMETRIST

## 2022-02-03 PROCEDURE — 92015 PR REFRACTION: ICD-10-PCS | Mod: S$GLB,,, | Performed by: OPTOMETRIST

## 2022-02-03 PROCEDURE — 1126F PR PAIN SEVERITY QUANTIFIED, NO PAIN PRESENT: ICD-10-PCS | Mod: CPTII,S$GLB,, | Performed by: OPTOMETRIST

## 2022-02-03 PROCEDURE — 99999 PR PBB SHADOW E&M-EST. PATIENT-LVL III: ICD-10-PCS | Mod: PBBFAC,,, | Performed by: OPTOMETRIST

## 2022-02-03 PROCEDURE — 92015 DETERMINE REFRACTIVE STATE: CPT | Mod: S$GLB,,, | Performed by: OPTOMETRIST

## 2022-02-03 PROCEDURE — 1101F PR PT FALLS ASSESS DOC 0-1 FALLS W/OUT INJ PAST YR: ICD-10-PCS | Mod: CPTII,S$GLB,, | Performed by: OPTOMETRIST

## 2022-02-03 PROCEDURE — 3288F FALL RISK ASSESSMENT DOCD: CPT | Mod: CPTII,S$GLB,, | Performed by: OPTOMETRIST

## 2022-02-03 PROCEDURE — 1101F PT FALLS ASSESS-DOCD LE1/YR: CPT | Mod: CPTII,S$GLB,, | Performed by: OPTOMETRIST

## 2022-02-03 PROCEDURE — 1157F PR ADVANCE CARE PLAN OR EQUIV PRESENT IN MEDICAL RECORD: ICD-10-PCS | Mod: CPTII,S$GLB,, | Performed by: OPTOMETRIST

## 2022-02-03 PROCEDURE — 1157F ADVNC CARE PLAN IN RCRD: CPT | Mod: CPTII,S$GLB,, | Performed by: OPTOMETRIST

## 2022-02-03 PROCEDURE — 92004 PR EYE EXAM, NEW PATIENT,COMPREHESV: ICD-10-PCS | Mod: S$GLB,,, | Performed by: OPTOMETRIST

## 2022-02-03 RX ORDER — POLYETHYLENE GLYCOL 3350, SODIUM SULFATE ANHYDROUS, SODIUM BICARBONATE, SODIUM CHLORIDE, POTASSIUM CHLORIDE 236; 22.74; 6.74; 5.86; 2.97 G/4L; G/4L; G/4L; G/4L; G/4L
POWDER, FOR SOLUTION ORAL
COMMUNITY
End: 2023-04-11

## 2022-02-03 RX ORDER — ERYTHROMYCIN 5 MG/G
OINTMENT OPHTHALMIC
COMMUNITY
End: 2022-02-03 | Stop reason: SDUPTHER

## 2022-02-03 RX ORDER — ERYTHROMYCIN 5 MG/G
OINTMENT OPHTHALMIC
Qty: 3.5 G | Refills: 6 | Status: SHIPPED | OUTPATIENT
Start: 2022-02-03 | End: 2023-04-11

## 2022-02-03 RX ORDER — NEOMYCIN SULFATE, POLYMYXIN B SULFATE AND DEXAMETHASONE 3.5; 10000; 1 MG/ML; [USP'U]/ML; MG/ML
1 SUSPENSION/ DROPS OPHTHALMIC 3 TIMES DAILY
Qty: 5 ML | Refills: 0 | Status: SHIPPED | OUTPATIENT
Start: 2022-02-03 | End: 2022-02-10

## 2022-02-03 NOTE — PROGRESS NOTES
[place]     JOHANNA: 06/2019  Chief complaint (CC): Eyes itch and water. Patient has been told he has   blepharitis and rosacea. Patient had used lid scrubs in the past but   stopped. Using AT's BID OU.  Glasses? +5 yrs. Old patient has distance only prescription and OTC +3.25   readers  Contacts? -  H/o eye surgery, injections or laser: -  H/o eye injury: -  Known eye conditions? cataracts  Family h/o eye conditions? -  Eye gtts? AT's BID OU      (-) Flashes (-)  Floaters (-) Mucous   (+)  Tearing (+) Itching (+) Burning   (-) Headaches (-) Eye Pain/discomfort (+) Irritation   (-)  Redness (-) Double vision (-) Blurry vision    Diabetic? -  A1c? -        Last edited by Katarina Sanderson on 2/3/2022  1:11 PM. (History)            Assessment /Plan     For exam results, see Encounter Report.    Blepharitis of upper and lower eyelids of both eyes, unspecified type  -     erythromycin (ROMYCIN) ophthalmic ointment; Apply 1/2 inch strip of ointment nightly on the eyelids of both eyes for 7 days  Dispense: 3.5 g; Refill: 6  -     neomycin-polymyxin-dexamethasone (MAXITROL) 3.5mg/mL-10,000 unit/mL-0.1 % DrpS; Place 1 drop into both eyes 3 (three) times daily. for 7 days  Dispense: 5 mL; Refill: 0    Rosacea conjunctivitis of both eyes    Senile nuclear sclerosis, bilateral    Hyperopia with astigmatism and presbyopia, bilateral      1-2. Cont Emycin QHS OU. Rx Maxitrol TID OU x 7 days. Cont lid scrubs. Rec Thera Tears Antimicrobial lid scrubs to help with Demodex recurrence. No e/o Demodex noted today.   3. Nuclear sclerotic cataract - not visually significant. Observe.  4. SRx released to patient. Patient educated on lens options. Normal ocular health. RTC 1 year for routine exam.

## 2022-04-05 ENCOUNTER — IMMUNIZATION (OUTPATIENT)
Dept: INTERNAL MEDICINE | Facility: CLINIC | Age: 72
End: 2022-04-05
Payer: COMMERCIAL

## 2022-04-05 DIAGNOSIS — Z23 NEED FOR VACCINATION: Primary | ICD-10-CM

## 2022-04-05 PROCEDURE — 0054A COVID-19, MRNA, LNP-S, PF, 30 MCG/0.3 ML DOSE VACCINE (PFIZER): CPT | Mod: CV19,PBBFAC | Performed by: INTERNAL MEDICINE

## 2022-06-01 ENCOUNTER — PATIENT MESSAGE (OUTPATIENT)
Dept: FAMILY MEDICINE | Facility: CLINIC | Age: 72
End: 2022-06-01
Payer: MEDICARE

## 2022-06-02 ENCOUNTER — OFFICE VISIT (OUTPATIENT)
Dept: DERMATOLOGY | Facility: CLINIC | Age: 72
End: 2022-06-02
Payer: COMMERCIAL

## 2022-06-02 DIAGNOSIS — Z12.83 SCREENING, MALIGNANT NEOPLASM, SKIN: ICD-10-CM

## 2022-06-02 DIAGNOSIS — L71.9 ROSACEA: Primary | ICD-10-CM

## 2022-06-02 DIAGNOSIS — L57.0 ACTINIC KERATOSES: ICD-10-CM

## 2022-06-02 DIAGNOSIS — L82.1 SEBORRHEIC KERATOSES: ICD-10-CM

## 2022-06-02 PROCEDURE — 1101F PT FALLS ASSESS-DOCD LE1/YR: CPT | Mod: CPTII,S$GLB,, | Performed by: STUDENT IN AN ORGANIZED HEALTH CARE EDUCATION/TRAINING PROGRAM

## 2022-06-02 PROCEDURE — 1160F RVW MEDS BY RX/DR IN RCRD: CPT | Mod: CPTII,S$GLB,, | Performed by: STUDENT IN AN ORGANIZED HEALTH CARE EDUCATION/TRAINING PROGRAM

## 2022-06-02 PROCEDURE — 17003 DESTRUCT PREMALG LES 2-14: CPT | Mod: S$GLB,,, | Performed by: STUDENT IN AN ORGANIZED HEALTH CARE EDUCATION/TRAINING PROGRAM

## 2022-06-02 PROCEDURE — 1101F PR PT FALLS ASSESS DOC 0-1 FALLS W/OUT INJ PAST YR: ICD-10-PCS | Mod: CPTII,S$GLB,, | Performed by: STUDENT IN AN ORGANIZED HEALTH CARE EDUCATION/TRAINING PROGRAM

## 2022-06-02 PROCEDURE — 99214 OFFICE O/P EST MOD 30 MIN: CPT | Mod: 25,S$GLB,, | Performed by: STUDENT IN AN ORGANIZED HEALTH CARE EDUCATION/TRAINING PROGRAM

## 2022-06-02 PROCEDURE — 1126F AMNT PAIN NOTED NONE PRSNT: CPT | Mod: CPTII,S$GLB,, | Performed by: STUDENT IN AN ORGANIZED HEALTH CARE EDUCATION/TRAINING PROGRAM

## 2022-06-02 PROCEDURE — 4010F ACE/ARB THERAPY RXD/TAKEN: CPT | Mod: CPTII,S$GLB,, | Performed by: STUDENT IN AN ORGANIZED HEALTH CARE EDUCATION/TRAINING PROGRAM

## 2022-06-02 PROCEDURE — 3288F FALL RISK ASSESSMENT DOCD: CPT | Mod: CPTII,S$GLB,, | Performed by: STUDENT IN AN ORGANIZED HEALTH CARE EDUCATION/TRAINING PROGRAM

## 2022-06-02 PROCEDURE — 99999 PR PBB SHADOW E&M-EST. PATIENT-LVL II: ICD-10-PCS | Mod: PBBFAC,,, | Performed by: STUDENT IN AN ORGANIZED HEALTH CARE EDUCATION/TRAINING PROGRAM

## 2022-06-02 PROCEDURE — 99214 PR OFFICE/OUTPT VISIT, EST, LEVL IV, 30-39 MIN: ICD-10-PCS | Mod: 25,S$GLB,, | Performed by: STUDENT IN AN ORGANIZED HEALTH CARE EDUCATION/TRAINING PROGRAM

## 2022-06-02 PROCEDURE — 17000 DESTRUCT PREMALG LESION: CPT | Mod: S$GLB,,, | Performed by: STUDENT IN AN ORGANIZED HEALTH CARE EDUCATION/TRAINING PROGRAM

## 2022-06-02 PROCEDURE — 1159F PR MEDICATION LIST DOCUMENTED IN MEDICAL RECORD: ICD-10-PCS | Mod: CPTII,S$GLB,, | Performed by: STUDENT IN AN ORGANIZED HEALTH CARE EDUCATION/TRAINING PROGRAM

## 2022-06-02 PROCEDURE — 1157F PR ADVANCE CARE PLAN OR EQUIV PRESENT IN MEDICAL RECORD: ICD-10-PCS | Mod: CPTII,S$GLB,, | Performed by: STUDENT IN AN ORGANIZED HEALTH CARE EDUCATION/TRAINING PROGRAM

## 2022-06-02 PROCEDURE — 1159F MED LIST DOCD IN RCRD: CPT | Mod: CPTII,S$GLB,, | Performed by: STUDENT IN AN ORGANIZED HEALTH CARE EDUCATION/TRAINING PROGRAM

## 2022-06-02 PROCEDURE — 1157F ADVNC CARE PLAN IN RCRD: CPT | Mod: CPTII,S$GLB,, | Performed by: STUDENT IN AN ORGANIZED HEALTH CARE EDUCATION/TRAINING PROGRAM

## 2022-06-02 PROCEDURE — 17000 PR DESTRUCTION(LASER SURGERY,CRYOSURGERY,CHEMOSURGERY),PREMALIGNANT LESIONS,FIRST LESION: ICD-10-PCS | Mod: S$GLB,,, | Performed by: STUDENT IN AN ORGANIZED HEALTH CARE EDUCATION/TRAINING PROGRAM

## 2022-06-02 PROCEDURE — 1126F PR PAIN SEVERITY QUANTIFIED, NO PAIN PRESENT: ICD-10-PCS | Mod: CPTII,S$GLB,, | Performed by: STUDENT IN AN ORGANIZED HEALTH CARE EDUCATION/TRAINING PROGRAM

## 2022-06-02 PROCEDURE — 17003 DESTRUCTION, PREMALIGNANT LESIONS; SECOND THROUGH 14 LESIONS: ICD-10-PCS | Mod: S$GLB,,, | Performed by: STUDENT IN AN ORGANIZED HEALTH CARE EDUCATION/TRAINING PROGRAM

## 2022-06-02 PROCEDURE — 1160F PR REVIEW ALL MEDS BY PRESCRIBER/CLIN PHARMACIST DOCUMENTED: ICD-10-PCS | Mod: CPTII,S$GLB,, | Performed by: STUDENT IN AN ORGANIZED HEALTH CARE EDUCATION/TRAINING PROGRAM

## 2022-06-02 PROCEDURE — 3288F PR FALLS RISK ASSESSMENT DOCUMENTED: ICD-10-PCS | Mod: CPTII,S$GLB,, | Performed by: STUDENT IN AN ORGANIZED HEALTH CARE EDUCATION/TRAINING PROGRAM

## 2022-06-02 PROCEDURE — 4010F PR ACE/ARB THEARPY RXD/TAKEN: ICD-10-PCS | Mod: CPTII,S$GLB,, | Performed by: STUDENT IN AN ORGANIZED HEALTH CARE EDUCATION/TRAINING PROGRAM

## 2022-06-02 PROCEDURE — 99999 PR PBB SHADOW E&M-EST. PATIENT-LVL II: CPT | Mod: PBBFAC,,, | Performed by: STUDENT IN AN ORGANIZED HEALTH CARE EDUCATION/TRAINING PROGRAM

## 2022-06-02 RX ORDER — IVERMECTIN 10 MG/G
CREAM TOPICAL
Qty: 45 G | Refills: 2 | Status: SHIPPED | OUTPATIENT
Start: 2022-06-02 | End: 2023-01-03

## 2022-06-02 NOTE — PROGRESS NOTES
Subjective:       Patient ID:  Riky Salcedo is a 72 y.o. male who presents for   Chief Complaint   Patient presents with    Spot     History of Present Illness: The patient presents with chief complaint of evaluation of a skin lesion  Location: top of the scalp, tops of the ears  Duration: months  Signs/Symptoms: brownish and red scaly growths  Prior treatments: none. Denies any history of skin cancer.    Patient with new complaint of lesion(s) - rosacea/demodex   Location: face, some on the chest  Duration: years  Symptoms: redness on the cheeks, red bumps and pus bumps on the chest  Relieving factors/Previous treatments: several rosacea medications in the past with minimal improvement.           Review of Systems   Constitutional: Negative for fever and chills.        Objective:    Physical Exam   Constitutional: He appears well-developed and well-nourished. No distress.   Neurological: He is alert and oriented to person, place, and time. He is not disoriented.   Psychiatric: He has a normal mood and affect.   Skin:   Areas Examined (abnormalities noted in diagram):   Scalp / Hair Palpated and Inspected  Head / Face Inspection Performed  Neck Inspection Performed  Chest / Axilla Inspection Performed  Abdomen Inspection Performed  Back Inspection Performed  RUE Inspected  LUE Inspection Performed                   Diagram Legend     Erythematous scaling macule/papule c/w actinic keratosis       Vascular papule c/w angioma      Pigmented verrucoid papule/plaque c/w seborrheic keratosis      Yellow umbilicated papule c/w sebaceous hyperplasia      Irregularly shaped tan macule c/w lentigo     1-2 mm smooth white papules consistent with Milia      Movable subcutaneous cyst with punctum c/w epidermal inclusion cyst      Subcutaneous movable cyst c/w pilar cyst      Firm pink to brown papule c/w dermatofibroma      Pedunculated fleshy papule(s) c/w skin tag(s)      Evenly pigmented macule c/w junctional nevus      Mildly variegated pigmented, slightly irregular-bordered macule c/w mildly atypical nevus      Flesh colored to evenly pigmented papule c/w intradermal nevus       Pink pearly papule/plaque c/w basal cell carcinoma      Erythematous hyperkeratotic cursted plaque c/w SCC      Surgical scar with no sign of skin cancer recurrence      Open and closed comedones      Inflammatory papules and pustules      Verrucoid papule consistent consistent with wart     Erythematous eczematous patches and plaques     Dystrophic onycholytic nail with subungual debris c/w onychomycosis     Umbilicated papule    Erythematous-base heme-crusted tan verrucoid plaque consistent with inflamed seborrheic keratosis     Erythematous Silvery Scaling Plaque c/w Psoriasis     See annotation      Assessment / Plan:        Rosacea  -     ivermectin (SOOLANTRA) 1 % Crea; Apply topically to the affected areas 2 times daily.  Dispense: 45 g; Refill: 2    Actinic keratoses  Cryosurgery Procedure Note    Verbal consent from the patient is obtained including, but not limited to, risk of hypopigmentation/hyperpigmentation, scar, recurrence of lesion. The patient is aware of the precancerous quality and need for treatment of these lesions. Liquid nitrogen cryosurgery is applied to the 4 actinic keratoses, as detailed in the physical exam, to produce a freeze injury. The patient is aware that blisters may form and is instructed on wound care with gentle cleansing and use of vaseline ointment to keep moist until healed. The patient is supplied a handout on cryosurgery and is instructed to call if lesions do not completely resolve.    Seborrheic keratoses  These are benign inherited growths without a malignant potential. Reassurance given to patient. No treatment is necessary.     Screening, malignant neoplasm, skin  upper body skin examination performed today including at least 6 points as noted in physical examination. No lesions suspicious for malignancy  noted.  Reassurance provided.    Instructed patient to observe lesion(s) for changes and follow up in clinic if changes are noted. Patient to monitor skin at home for new or changing lesions and follow up in clinic if noted.         Follow up in about 1 year (around 6/2/2023).

## 2022-06-07 ENCOUNTER — TELEPHONE (OUTPATIENT)
Dept: FAMILY MEDICINE | Facility: CLINIC | Age: 72
End: 2022-06-07
Payer: MEDICARE

## 2022-06-07 DIAGNOSIS — I10 ESSENTIAL HYPERTENSION: Primary | ICD-10-CM

## 2022-06-07 DIAGNOSIS — R73.03 PREDIABETES: ICD-10-CM

## 2022-06-07 NOTE — TELEPHONE ENCOUNTER
----- Message from Maria Luz Mike, Patient Care Assistant sent at 6/7/2022 12:53 PM CDT -----  Type:  Needs Medical Advice    Who Called:  pt  Symptoms (please be specific):  Patient would like a call back regarding getting lab order before his visit on 06/09   Would the patient rather a call back or a response via [facility]?  Please call  Best Call Back Number:  [phone]  Additional Information:

## 2022-06-09 ENCOUNTER — LAB VISIT (OUTPATIENT)
Dept: LAB | Facility: HOSPITAL | Age: 72
End: 2022-06-09
Attending: FAMILY MEDICINE
Payer: MEDICARE

## 2022-06-09 ENCOUNTER — OFFICE VISIT (OUTPATIENT)
Dept: FAMILY MEDICINE | Facility: CLINIC | Age: 72
End: 2022-06-09
Payer: COMMERCIAL

## 2022-06-09 VITALS
HEIGHT: 72 IN | HEART RATE: 81 BPM | WEIGHT: 235 LBS | SYSTOLIC BLOOD PRESSURE: 130 MMHG | DIASTOLIC BLOOD PRESSURE: 78 MMHG | OXYGEN SATURATION: 95 % | BODY MASS INDEX: 31.83 KG/M2

## 2022-06-09 DIAGNOSIS — I10 ESSENTIAL HYPERTENSION: ICD-10-CM

## 2022-06-09 DIAGNOSIS — R73.03 PREDIABETES: ICD-10-CM

## 2022-06-09 DIAGNOSIS — I10 ESSENTIAL HYPERTENSION: Primary | ICD-10-CM

## 2022-06-09 LAB
ALBUMIN SERPL BCP-MCNC: 3.7 G/DL (ref 3.5–5.2)
ALP SERPL-CCNC: 92 U/L (ref 55–135)
ALT SERPL W/O P-5'-P-CCNC: 26 U/L (ref 10–44)
ANION GAP SERPL CALC-SCNC: 11 MMOL/L (ref 8–16)
AST SERPL-CCNC: 18 U/L (ref 10–40)
BASOPHILS # BLD AUTO: 0.09 K/UL (ref 0–0.2)
BASOPHILS NFR BLD: 1 % (ref 0–1.9)
BILIRUB SERPL-MCNC: 0.2 MG/DL (ref 0.1–1)
BUN SERPL-MCNC: 19 MG/DL (ref 8–23)
CALCIUM SERPL-MCNC: 9.2 MG/DL (ref 8.7–10.5)
CHLORIDE SERPL-SCNC: 107 MMOL/L (ref 95–110)
CHOLEST SERPL-MCNC: 240 MG/DL (ref 120–199)
CHOLEST/HDLC SERPL: 5.9 {RATIO} (ref 2–5)
CO2 SERPL-SCNC: 23 MMOL/L (ref 23–29)
CREAT SERPL-MCNC: 1 MG/DL (ref 0.5–1.4)
DIFFERENTIAL METHOD: ABNORMAL
EOSINOPHIL # BLD AUTO: 0.2 K/UL (ref 0–0.5)
EOSINOPHIL NFR BLD: 2.6 % (ref 0–8)
ERYTHROCYTE [DISTWIDTH] IN BLOOD BY AUTOMATED COUNT: 13.4 % (ref 11.5–14.5)
EST. GFR  (AFRICAN AMERICAN): >60 ML/MIN/1.73 M^2
EST. GFR  (NON AFRICAN AMERICAN): >60 ML/MIN/1.73 M^2
ESTIMATED AVG GLUCOSE: 120 MG/DL (ref 68–131)
GLUCOSE SERPL-MCNC: 103 MG/DL (ref 70–110)
HBA1C MFR BLD: 5.8 % (ref 4–5.6)
HCT VFR BLD AUTO: 45.4 % (ref 40–54)
HDLC SERPL-MCNC: 41 MG/DL (ref 40–75)
HDLC SERPL: 17.1 % (ref 20–50)
HGB BLD-MCNC: 14.7 G/DL (ref 14–18)
IMM GRANULOCYTES # BLD AUTO: 0.05 K/UL (ref 0–0.04)
IMM GRANULOCYTES NFR BLD AUTO: 0.5 % (ref 0–0.5)
LDLC SERPL CALC-MCNC: 157.2 MG/DL (ref 63–159)
LYMPHOCYTES # BLD AUTO: 2.1 K/UL (ref 1–4.8)
LYMPHOCYTES NFR BLD: 23.2 % (ref 18–48)
MCH RBC QN AUTO: 30.2 PG (ref 27–31)
MCHC RBC AUTO-ENTMCNC: 32.4 G/DL (ref 32–36)
MCV RBC AUTO: 93 FL (ref 82–98)
MONOCYTES # BLD AUTO: 0.8 K/UL (ref 0.3–1)
MONOCYTES NFR BLD: 8.5 % (ref 4–15)
NEUTROPHILS # BLD AUTO: 5.9 K/UL (ref 1.8–7.7)
NEUTROPHILS NFR BLD: 64.2 % (ref 38–73)
NONHDLC SERPL-MCNC: 199 MG/DL
NRBC BLD-RTO: 0 /100 WBC
PLATELET # BLD AUTO: 261 K/UL (ref 150–450)
PMV BLD AUTO: 11.2 FL (ref 9.2–12.9)
POTASSIUM SERPL-SCNC: 5.1 MMOL/L (ref 3.5–5.1)
PROT SERPL-MCNC: 6.8 G/DL (ref 6–8.4)
RBC # BLD AUTO: 4.87 M/UL (ref 4.6–6.2)
SODIUM SERPL-SCNC: 141 MMOL/L (ref 136–145)
TRIGL SERPL-MCNC: 209 MG/DL (ref 30–150)
TSH SERPL DL<=0.005 MIU/L-ACNC: 3.21 UIU/ML (ref 0.4–4)
WBC # BLD AUTO: 9.17 K/UL (ref 3.9–12.7)

## 2022-06-09 PROCEDURE — 3008F PR BODY MASS INDEX (BMI) DOCUMENTED: ICD-10-PCS | Mod: CPTII,S$GLB,, | Performed by: FAMILY MEDICINE

## 2022-06-09 PROCEDURE — 36415 COLL VENOUS BLD VENIPUNCTURE: CPT | Mod: PO | Performed by: FAMILY MEDICINE

## 2022-06-09 PROCEDURE — 4010F ACE/ARB THERAPY RXD/TAKEN: CPT | Mod: CPTII,S$GLB,, | Performed by: FAMILY MEDICINE

## 2022-06-09 PROCEDURE — 1126F AMNT PAIN NOTED NONE PRSNT: CPT | Mod: CPTII,S$GLB,, | Performed by: FAMILY MEDICINE

## 2022-06-09 PROCEDURE — 4010F PR ACE/ARB THEARPY RXD/TAKEN: ICD-10-PCS | Mod: CPTII,S$GLB,, | Performed by: FAMILY MEDICINE

## 2022-06-09 PROCEDURE — 3075F PR MOST RECENT SYSTOLIC BLOOD PRESS GE 130-139MM HG: ICD-10-PCS | Mod: CPTII,S$GLB,, | Performed by: FAMILY MEDICINE

## 2022-06-09 PROCEDURE — 99214 PR OFFICE/OUTPT VISIT, EST, LEVL IV, 30-39 MIN: ICD-10-PCS | Mod: S$GLB,,, | Performed by: FAMILY MEDICINE

## 2022-06-09 PROCEDURE — 3078F PR MOST RECENT DIASTOLIC BLOOD PRESSURE < 80 MM HG: ICD-10-PCS | Mod: CPTII,S$GLB,, | Performed by: FAMILY MEDICINE

## 2022-06-09 PROCEDURE — 3078F DIAST BP <80 MM HG: CPT | Mod: CPTII,S$GLB,, | Performed by: FAMILY MEDICINE

## 2022-06-09 PROCEDURE — 1101F PR PT FALLS ASSESS DOC 0-1 FALLS W/OUT INJ PAST YR: ICD-10-PCS | Mod: CPTII,S$GLB,, | Performed by: FAMILY MEDICINE

## 2022-06-09 PROCEDURE — 3288F PR FALLS RISK ASSESSMENT DOCUMENTED: ICD-10-PCS | Mod: CPTII,S$GLB,, | Performed by: FAMILY MEDICINE

## 2022-06-09 PROCEDURE — 1126F PR PAIN SEVERITY QUANTIFIED, NO PAIN PRESENT: ICD-10-PCS | Mod: CPTII,S$GLB,, | Performed by: FAMILY MEDICINE

## 2022-06-09 PROCEDURE — 83036 HEMOGLOBIN GLYCOSYLATED A1C: CPT | Performed by: FAMILY MEDICINE

## 2022-06-09 PROCEDURE — 80061 LIPID PANEL: CPT | Performed by: FAMILY MEDICINE

## 2022-06-09 PROCEDURE — 85025 COMPLETE CBC W/AUTO DIFF WBC: CPT | Performed by: FAMILY MEDICINE

## 2022-06-09 PROCEDURE — 1159F PR MEDICATION LIST DOCUMENTED IN MEDICAL RECORD: ICD-10-PCS | Mod: CPTII,S$GLB,, | Performed by: FAMILY MEDICINE

## 2022-06-09 PROCEDURE — 99214 OFFICE O/P EST MOD 30 MIN: CPT | Mod: S$GLB,,, | Performed by: FAMILY MEDICINE

## 2022-06-09 PROCEDURE — 1101F PT FALLS ASSESS-DOCD LE1/YR: CPT | Mod: CPTII,S$GLB,, | Performed by: FAMILY MEDICINE

## 2022-06-09 PROCEDURE — 1157F PR ADVANCE CARE PLAN OR EQUIV PRESENT IN MEDICAL RECORD: ICD-10-PCS | Mod: CPTII,S$GLB,, | Performed by: FAMILY MEDICINE

## 2022-06-09 PROCEDURE — 99999 PR PBB SHADOW E&M-EST. PATIENT-LVL III: CPT | Mod: PBBFAC,,, | Performed by: FAMILY MEDICINE

## 2022-06-09 PROCEDURE — 80053 COMPREHEN METABOLIC PANEL: CPT | Performed by: FAMILY MEDICINE

## 2022-06-09 PROCEDURE — 1157F ADVNC CARE PLAN IN RCRD: CPT | Mod: CPTII,S$GLB,, | Performed by: FAMILY MEDICINE

## 2022-06-09 PROCEDURE — 3008F BODY MASS INDEX DOCD: CPT | Mod: CPTII,S$GLB,, | Performed by: FAMILY MEDICINE

## 2022-06-09 PROCEDURE — 3288F FALL RISK ASSESSMENT DOCD: CPT | Mod: CPTII,S$GLB,, | Performed by: FAMILY MEDICINE

## 2022-06-09 PROCEDURE — 1159F MED LIST DOCD IN RCRD: CPT | Mod: CPTII,S$GLB,, | Performed by: FAMILY MEDICINE

## 2022-06-09 PROCEDURE — 84443 ASSAY THYROID STIM HORMONE: CPT | Performed by: FAMILY MEDICINE

## 2022-06-09 PROCEDURE — 3075F SYST BP GE 130 - 139MM HG: CPT | Mod: CPTII,S$GLB,, | Performed by: FAMILY MEDICINE

## 2022-06-09 PROCEDURE — 99999 PR PBB SHADOW E&M-EST. PATIENT-LVL III: ICD-10-PCS | Mod: PBBFAC,,, | Performed by: FAMILY MEDICINE

## 2022-06-09 NOTE — PROGRESS NOTES
Subjective:       Patient ID: Riky Salcedo is a 72 y.o. male.    Chief Complaint: No chief complaint on file.    72 years old male came to the clinic for blood pressure check.  Blood pressure today was stable.  No chest pain, palpitation, orthopnea  or PND.  Last BMI was 31. He is currently trying to lose weight.      Review of Systems   Constitutional: Negative.    HENT: Negative.    Eyes: Negative.    Respiratory: Negative.    Cardiovascular: Negative.  Negative for chest pain, palpitations, leg swelling and claudication.   Gastrointestinal: Negative.    Genitourinary: Negative.    Musculoskeletal: Negative.    Integumentary:  Negative.   Neurological: Negative.    Psychiatric/Behavioral: Negative.          Objective:      Physical Exam  Vitals and nursing note reviewed.   Constitutional:       General: He is not in acute distress.     Appearance: He is well-developed. He is not diaphoretic.   HENT:      Head: Normocephalic and atraumatic.      Right Ear: External ear normal.      Left Ear: External ear normal.      Nose: Nose normal.      Mouth/Throat:      Pharynx: No oropharyngeal exudate.   Eyes:      General: No scleral icterus.        Right eye: No discharge.         Left eye: No discharge.      Conjunctiva/sclera: Conjunctivae normal.      Pupils: Pupils are equal, round, and reactive to light.   Neck:      Thyroid: No thyromegaly.      Vascular: No JVD.      Trachea: No tracheal deviation.   Cardiovascular:      Rate and Rhythm: Normal rate and regular rhythm.      Heart sounds: Normal heart sounds. No murmur heard.    No friction rub. No gallop.   Pulmonary:      Effort: Pulmonary effort is normal. No respiratory distress.      Breath sounds: Normal breath sounds. No stridor. No wheezing or rales.   Chest:      Chest wall: No tenderness.   Abdominal:      General: Bowel sounds are normal. There is no distension.      Palpations: Abdomen is soft. There is no mass.      Tenderness: There is no abdominal  tenderness. There is no guarding or rebound.   Musculoskeletal:         General: No tenderness. Normal range of motion.      Cervical back: Normal range of motion and neck supple.   Lymphadenopathy:      Cervical: No cervical adenopathy.   Skin:     General: Skin is warm and dry.      Coloration: Skin is not pale.      Findings: No erythema or rash.   Neurological:      Mental Status: He is alert and oriented to person, place, and time.      Cranial Nerves: No cranial nerve deficit.      Motor: No abnormal muscle tone.      Coordination: Coordination normal.      Deep Tendon Reflexes: Reflexes are normal and symmetric. Reflexes normal.   Psychiatric:         Behavior: Behavior normal.         Thought Content: Thought content normal.         Judgment: Judgment normal.         Assessment:       Problem List Items Addressed This Visit    None     Visit Diagnoses     Essential hypertension    -  Primary    BMI 31.0-31.9,adult              Plan:         Diagnoses and all orders for this visit:    Essential hypertension    BMI 31.0-31.9,adult    Continue monitoring blood pressure at home, low sodium diet.   Diet and physical activity to promote weight loss.

## 2022-07-07 ENCOUNTER — OFFICE VISIT (OUTPATIENT)
Dept: DERMATOLOGY | Facility: CLINIC | Age: 72
End: 2022-07-07
Payer: MEDICARE

## 2022-07-07 DIAGNOSIS — D17.1 LIPOMA OF SKIN AND SUBCUTANEOUS TISSUE OF TRUNK: Primary | ICD-10-CM

## 2022-07-07 DIAGNOSIS — L71.9 ROSACEA: ICD-10-CM

## 2022-07-07 DIAGNOSIS — L30.4 INTERTRIGO: ICD-10-CM

## 2022-07-07 PROCEDURE — 99999 PR PBB SHADOW E&M-EST. PATIENT-LVL III: ICD-10-PCS | Mod: PBBFAC,,, | Performed by: DERMATOLOGY

## 2022-07-07 PROCEDURE — 99213 OFFICE O/P EST LOW 20 MIN: CPT | Mod: PBBFAC,PO | Performed by: DERMATOLOGY

## 2022-07-07 PROCEDURE — 99999 PR PBB SHADOW E&M-EST. PATIENT-LVL III: CPT | Mod: PBBFAC,,, | Performed by: DERMATOLOGY

## 2022-07-07 PROCEDURE — 99213 PR OFFICE/OUTPT VISIT, EST, LEVL III, 20-29 MIN: ICD-10-PCS | Mod: S$PBB,,, | Performed by: DERMATOLOGY

## 2022-07-07 PROCEDURE — 99213 OFFICE O/P EST LOW 20 MIN: CPT | Mod: S$PBB,,, | Performed by: DERMATOLOGY

## 2022-07-07 RX ORDER — DESONIDE 0.5 MG/G
OINTMENT TOPICAL
Qty: 60 G | Refills: 1 | Status: SHIPPED | OUTPATIENT
Start: 2022-07-07 | End: 2023-04-11

## 2022-07-07 NOTE — PATIENT INSTRUCTIONS
Groin :  Recommend white vinegar: water 1:1 compresses  nightly after bath/shower followed by cool blow dry and then application of prescription medication.     Use tinactin powder for maintenance in the morning.     Wash affected areas 2x per week with Hibiclens wash which can be purchased over the counter

## 2022-07-07 NOTE — PROGRESS NOTES
Subjective:       Patient ID:  Riky Salcedo is a 72 y.o. male who presents for   Chief Complaint   Patient presents with    Rosacea     face    Mass     Back      Pt c/o rosacea to face x 10yrs. Prev tx with tea tree shampoo and ivermectin cream.   Also c/o lump to back x 20yrs. Pain with friction, sitting for long periods of time. No prev tx  C/o rash in groin. Uses desonide for about 2 days each month and this helps.      Rosacea    Mass        Review of Systems   Skin: Negative for daily sunscreen use, activity-related sunscreen use and tendency to form keloidal scars.   Hematologic/Lymphatic: Does not bruise/bleed easily.        Objective:    Physical Exam   Constitutional: He appears well-developed and well-nourished. No distress.   Neurological: He is alert and oriented to person, place, and time. He is not disoriented.   Psychiatric: He has a normal mood and affect.   Skin:   Areas Examined (abnormalities noted in diagram):   Head / Face Inspection Performed  Back Inspection Performed                   Diagram Legend     Erythematous scaling macule/papule c/w actinic keratosis       Vascular papule c/w angioma      Pigmented verrucoid papule/plaque c/w seborrheic keratosis      Yellow umbilicated papule c/w sebaceous hyperplasia      Irregularly shaped tan macule c/w lentigo     1-2 mm smooth white papules consistent with Milia      Movable subcutaneous cyst with punctum c/w epidermal inclusion cyst      Subcutaneous movable cyst c/w pilar cyst      Firm pink to brown papule c/w dermatofibroma      Pedunculated fleshy papule(s) c/w skin tag(s)      Evenly pigmented macule c/w junctional nevus     Mildly variegated pigmented, slightly irregular-bordered macule c/w mildly atypical nevus      Flesh colored to evenly pigmented papule c/w intradermal nevus       Pink pearly papule/plaque c/w basal cell carcinoma      Erythematous hyperkeratotic cursted plaque c/w SCC      Surgical scar with no sign of skin  cancer recurrence      Open and closed comedones      Inflammatory papules and pustules      Verrucoid papule consistent consistent with wart     Erythematous eczematous patches and plaques     Dystrophic onycholytic nail with subungual debris c/w onychomycosis     Umbilicated papule    Erythematous-base heme-crusted tan verrucoid plaque consistent with inflamed seborrheic keratosis     Erythematous Silvery Scaling Plaque c/w Psoriasis     See annotation      Assessment / Plan:        Lipoma of skin and subcutaneous tissue of trunk  Refer to Gen Surgery or Plastics     Intertrigo  Groin :  Recommend white vinegar: water 1:1 compresses  nightly after bath/shower followed by cool blow dry and then application of prescription medication.     Use tinactin powder for maintenance in the morning.     Wash affected areas 2x per week with Hibiclens wash which can be purchased over the counter  -     desonide 0.05% (DESOWEN) 0.05 % Oint; aaa in groin qd prn flare; not more than 1 week straight in same location  Dispense: 60 g; Refill: 1    Rosacea  Refer to Dr Veloz for laser eval  Continue ivermectin              Follow up if symptoms worsen or fail to improve.

## 2022-07-08 ENCOUNTER — TELEPHONE (OUTPATIENT)
Dept: DERMATOLOGY | Facility: CLINIC | Age: 72
End: 2022-07-08
Payer: MEDICARE

## 2022-07-08 NOTE — TELEPHONE ENCOUNTER
----- Message from Marcos Muhammad MA sent at 7/7/2022  5:21 PM CDT -----  Call pt tomorrow  ----- Message -----  From: Anna Morales MD  Sent: 7/7/2022   8:30 AM CDT  To: Magdiel Rodney Staff    Pt interested in laser for vessels on both cheeks, nose

## 2022-07-21 ENCOUNTER — TELEPHONE (OUTPATIENT)
Dept: DERMATOLOGY | Facility: CLINIC | Age: 72
End: 2022-07-21
Payer: MEDICARE

## 2022-07-21 NOTE — TELEPHONE ENCOUNTER
----- Message from Marty Manning sent at 7/21/2022 10:25 AM CDT -----  Regarding: call back  Type:  Patient Returning Call    Who Called:patient     Who Left Message for Patient:n/a    Does the patient know what this is regarding?:missed call, call back     Would the patient rather a call back or a response via [facility]? Call back     Best Call Back Number:[phone]  Additional Information: n/a

## 2022-07-21 NOTE — TELEPHONE ENCOUNTER
Called pt and explained how laser clinic works. He states that he will think about it and call back

## 2022-08-04 ENCOUNTER — PATIENT MESSAGE (OUTPATIENT)
Dept: FAMILY MEDICINE | Facility: CLINIC | Age: 72
End: 2022-08-04
Payer: MEDICARE

## 2022-08-04 ENCOUNTER — PATIENT MESSAGE (OUTPATIENT)
Dept: ADMINISTRATIVE | Facility: OTHER | Age: 72
End: 2022-08-04
Payer: MEDICARE

## 2022-08-05 ENCOUNTER — OFFICE VISIT (OUTPATIENT)
Dept: FAMILY MEDICINE | Facility: CLINIC | Age: 72
End: 2022-08-05
Payer: MEDICARE

## 2022-08-05 VITALS — OXYGEN SATURATION: 95 %

## 2022-08-05 DIAGNOSIS — I10 ESSENTIAL HYPERTENSION: ICD-10-CM

## 2022-08-05 DIAGNOSIS — U07.1 COVID-19 VIRUS INFECTION: Primary | ICD-10-CM

## 2022-08-05 PROCEDURE — 99442 PR PHYSICIAN TELEPHONE EVALUATION 11-20 MIN: ICD-10-PCS | Mod: CR,95,, | Performed by: FAMILY MEDICINE

## 2022-08-05 PROCEDURE — 99442 PR PHYSICIAN TELEPHONE EVALUATION 11-20 MIN: CPT | Mod: CR,95,, | Performed by: FAMILY MEDICINE

## 2022-08-05 NOTE — PROGRESS NOTES
Answers for HPI/ROS submitted by the patient on 8/4/2022  activity change: No  unexpected weight change: No  neck pain: No  hearing loss: No  rhinorrhea: Yes  trouble swallowing: No  eye discharge: No  visual disturbance: No  chest tightness: No  wheezing: No  chest pain: No  palpitations: No  blood in stool: No  constipation: No  vomiting: No  diarrhea: No  polydipsia: No  polyuria: No  difficulty urinating: No  urgency: No  hematuria: No  joint swelling: No  arthralgias: No  headaches: No  weakness: No  confusion: No  dysphoric mood: No

## 2022-08-05 NOTE — PROGRESS NOTES
The patient location is: [place]  The chief complaint leading to consultation is:  Cough with positive Coronavirus testing 1 day ago    Visit type: audiovisual    Face to Face time with patient: 20 minutes of total time spent on the encounter, which includes face to face time and non-face to face time preparing to see the patient (eg, review of tests), Obtaining and/or reviewing separately obtained history, Documenting clinical information in the electronic or other health record, Independently interpreting results (not separately reported) and communicating results to the patient/family/caregiver, or Care coordination (not separately reported).         Each patient to whom he or she provides medical services by telemedicine is:  (1) informed of the relationship between the physician and patient and the respective role of any other health care provider with respect to management of the patient; and (2) notified that he or she may decline to receive medical services by telemedicine and may withdraw from such care at any time.    Notes:  72 years male with history of hypertension and obesity with positive Coronavirus test 1 day ago.  The patient with cough, congestion and general malaise.  He reports also ear pain.  no fever or chills.      Diagnoses and all orders for this visit:    COVID-19 virus infection  -     nirmatrelvir-ritonavir 300 mg (150 mg x 2)-100 mg copackaged tablets (EUA); Take 3 tablets by mouth 2 (two) times daily for 5 days. Each dose contains 2 nirmatrelvir (pink tablets) and 1 ritonavir (white tablet). Take all 3 tablets together    Essential hypertension      Continue monitoring blood pressure at home, low sodium diet.   Diet and physical activity to promote weight loss.  Answers for HPI/ROS submitted by the patient on 8/4/2022  activity change: No  unexpected weight change: No  neck pain: No  hearing loss: No  rhinorrhea: Yes  trouble swallowing: No  eye discharge: No  visual disturbance:  No  chest tightness: No  wheezing: No  chest pain: No  palpitations: No  blood in stool: No  constipation: No  vomiting: No  diarrhea: No  polydipsia: No  polyuria: No  difficulty urinating: No  urgency: No  hematuria: No  joint swelling: No  arthralgias: No  headaches: No  weakness: No  confusion: No  dysphoric mood: No

## 2022-08-16 ENCOUNTER — OFFICE VISIT (OUTPATIENT)
Dept: FAMILY MEDICINE | Facility: CLINIC | Age: 72
End: 2022-08-16
Payer: MEDICARE

## 2022-08-16 VITALS
WEIGHT: 231.25 LBS | BODY MASS INDEX: 31.32 KG/M2 | DIASTOLIC BLOOD PRESSURE: 64 MMHG | HEART RATE: 92 BPM | HEIGHT: 72 IN | SYSTOLIC BLOOD PRESSURE: 126 MMHG | OXYGEN SATURATION: 98 % | TEMPERATURE: 98 F

## 2022-08-16 DIAGNOSIS — I10 PRIMARY HYPERTENSION: ICD-10-CM

## 2022-08-16 DIAGNOSIS — R05.8 POST-VIRAL COUGH SYNDROME: Primary | ICD-10-CM

## 2022-08-16 PROCEDURE — 99213 OFFICE O/P EST LOW 20 MIN: CPT | Mod: S$PBB,,, | Performed by: INTERNAL MEDICINE

## 2022-08-16 PROCEDURE — 99213 PR OFFICE/OUTPT VISIT, EST, LEVL III, 20-29 MIN: ICD-10-PCS | Mod: S$PBB,,, | Performed by: INTERNAL MEDICINE

## 2022-08-16 PROCEDURE — 99999 PR PBB SHADOW E&M-EST. PATIENT-LVL III: CPT | Mod: PBBFAC,,, | Performed by: INTERNAL MEDICINE

## 2022-08-16 PROCEDURE — 99999 PR PBB SHADOW E&M-EST. PATIENT-LVL III: ICD-10-PCS | Mod: PBBFAC,,, | Performed by: INTERNAL MEDICINE

## 2022-08-16 PROCEDURE — 99213 OFFICE O/P EST LOW 20 MIN: CPT | Mod: PBBFAC,PO | Performed by: INTERNAL MEDICINE

## 2022-08-16 RX ORDER — PROMETHAZINE HYDROCHLORIDE 6.25 MG/5ML
6.25 SYRUP ORAL EVERY 6 HOURS PRN
Qty: 120 ML | Refills: 0 | Status: SHIPPED | OUTPATIENT
Start: 2022-08-16 | End: 2023-04-11

## 2022-08-16 RX ORDER — BENZONATATE 200 MG/1
200 CAPSULE ORAL 3 TIMES DAILY PRN
Qty: 30 CAPSULE | Refills: 2 | Status: SHIPPED | OUTPATIENT
Start: 2022-08-16 | End: 2022-08-26

## 2022-08-16 NOTE — PROGRESS NOTES
Subjective:       Patient ID: Riky Salcedo is a 72 y.o. male.    Chief Complaint: Cough and Nasal Congestion      HPI  Riky Salcedo is a 72 y.o. male with chronic conditions of  hypertension, hyperlipidemia, alcohol abuse, obesity who presents today for evaluation of upper respiratory tract symptoms.    Reports he started with upper respiratory symptoms on August 4.  Has sore throat and nasal congestion.  Tested positive for COVID.  The next day started Paxlovid with improvement.  In the last 2 days has been having cough, a tickle in his throat, and some sinus congestion.  To Clarity in which helped.  The denies sore throat, fever, short of breath, or leg swelling.  Nobody close to him with similar symptoms after recovered from COVID.  Has been drinking fluids and staying nourished.  Mucus has been clear.  Has used Flonase.    He no longer smokes.  Reports occasional alcohol.  He is retired.    Health Maintenance:  Health Maintenance   Topic Date Due    TETANUS VACCINE  03/24/2025    Lipid Panel  06/09/2027    Hepatitis C Screening  Completed    Abdominal Aortic Aneurysm Screening  Addressed       Review of Systems   Constitutional: Negative.  Negative for fever and unexpected weight change.   HENT: Positive for nasal congestion, postnasal drip and sinus pressure/congestion.    Respiratory: Positive for cough. Negative for shortness of breath.    Cardiovascular: Negative.  Negative for chest pain, palpitations and leg swelling.   Gastrointestinal: Negative.    Genitourinary: Negative for difficulty urinating.   Musculoskeletal: Negative.    Integumentary:  Negative.   Neurological: Positive for headaches.   Psychiatric/Behavioral: Negative.       Past Medical History:   Diagnosis Date    Cataract     Fever blister     HSV-1 (herpes simplex virus 1) infection     Hyperlipidemia     Hypertension     Keloid cicatrix     Low serum testosterone level     Obese     Peyronie's disease        Past  Surgical History:   Procedure Laterality Date    COLONOSCOPY N/A 8/5/2016    Procedure: COLONOSCOPY;  Surgeon: Denis Herzog MD;  [facility]  Service: Endoscopy;  Laterality: N/A;  has hemorrhoid that may need to be banded    COLONOSCOPY N/A 7/29/2021    Procedure: COLONOSCOPY;  Surgeon: Deejay Corley MD;  Location[facility] ([facility]  Service: Endoscopy;  Laterality: N/A;  Pt. is fully vaccinated.EC    HERNIA REPAIR      UMBILICAL HERNIA REPAIR         Family History   Problem Relation Age of Onset    Heart disease Mother     Heart disease Father     Hypertension Father     Heart disease Paternal Grandmother     Hypertension Paternal Grandmother     Colon cancer Neg Hx     Crohn's disease Neg Hx     Ulcerative colitis Neg Hx     Melanoma Neg Hx     Prostate cancer Neg Hx     Kidney disease Neg Hx        Social History     Socioeconomic History    Marital status:    Occupational History    Occupation: Retired     Employer: [employer]   Tobacco Use    Smoking status: Former Smoker     Types: Cigars    Smokeless tobacco: Former User   Substance and Sexual Activity    Alcohol use: Yes     Alcohol/week: 4.2 standard drinks     Types: 5 Standard drinks or equivalent per week     Comment: 4-5 beers a day    Drug use: No    Sexual activity: Yes     Partners: Female       Current Outpatient Medications   Medication Sig Dispense Refill    desonide 0.05% (DESOWEN) 0.05 % Oint APPLY ON SCROTUM TWICE A DAY FOR 1 TO 2 WEEKS THEN AS NEEDED FOR FLARES 60 g 0    desonide 0.05% (DESOWEN) 0.05 % Oint aaa in groin qd prn flare; not more than 1 week straight in same location 60 g 1    erythromycin (ROMYCIN) ophthalmic ointment Apply 1/2 inch strip of ointment nightly on the eyelids of both eyes for 7 days 3.5 g 6    ivermectin (SOOLANTRA) 1 % Crea Apply topically to the affected areas 2 times daily. 45 g 2    lisinopriL (PRINIVIL,ZESTRIL) 40 MG tablet  Take 1 tablet by mouth once daily 90 tablet 3    polyethylene glycol (GOLYTELY,NULYTELY) 236-22.74-6.74 -5.86 gram suspension peg 3350-electrolytes 236 gram-22.74 gram-6.74 gram-5.86 gram solution      valACYclovir (VALTREX) 1000 MG tablet TAKE 1 TABLET BY MOUTH TWICE DAILY 6 tablet 6     No current facility-administered medications for this visit.       Review of patient's allergies indicates:  No Known Allergies      Objective:       Last 3 sets of Vitals    Vitals - 1 value per visit 8/5/2022 8/16/2022 8/16/2022   SYSTOLIC - - 126   DIASTOLIC - - 64   Pulse - - 92   Temp - - 98   Resp - - -   SPO2 95 - 98   Weight (lb) - - 231.26   Weight (kg) - - 104.9   Height - - 72   BMI (Calculated) - - 31.4   VISIT REPORT - - -   Pain Score  - 0 -   Some recent data might be hidden   Physical Exam  Constitutional:       General: He is not in acute distress.  HENT:      Head: Normocephalic.      Right Ear: Tympanic membrane, ear canal and external ear normal.      Left Ear: Tympanic membrane, ear canal and external ear normal.      Nose: Congestion present.      Comments: Clear secretions     Mouth/Throat:      Mouth: Mucous membranes are moist.      Pharynx: No oropharyngeal exudate.      Comments: Postnasal drip  Eyes:      General: No scleral icterus.     Extraocular Movements: Extraocular movements intact.      Conjunctiva/sclera: Conjunctivae normal.   Neck:      Vascular: No carotid bruit.      Comments: No goiter.  Cardiovascular:      Rate and Rhythm: Normal rate and regular rhythm.      Pulses: Normal pulses.      Heart sounds: Normal heart sounds.   Pulmonary:      Effort: Pulmonary effort is normal.      Breath sounds: Normal breath sounds.   Abdominal:      General: Bowel sounds are normal. There is no distension.      Palpations: Abdomen is soft. There is no mass.      Tenderness: There is no abdominal tenderness.   Musculoskeletal:         General: No swelling.   Lymphadenopathy:      Cervical: No cervical  adenopathy.   Skin:     General: Skin is warm and dry.   Neurological:      General: No focal deficit present.      Mental Status: He is alert and oriented to person, place, and time.   Psychiatric:         Mood and Affect: Mood normal.         Behavior: Behavior normal.           CBC:  Recent Labs   Lab 09/17/21  0809 12/09/21  0708 06/09/22  1025   WBC 7.59 7.75 9.17   RBC 4.41 L 4.72 4.87   Hemoglobin 13.4 L 14.3 14.7   Hematocrit 40.3 43.2 45.4   Platelets 304 252 261   MCV 91 92 93   MCH 30.4 30.3 30.2   MCHC 33.3 33.1 32.4     CMP:  Recent Labs   Lab 09/17/21  0809 12/09/21  0708 06/09/22  1025   Glucose 103 82 103   Calcium 9.8 9.2 9.2   Albumin 3.6 3.6 3.7   Total Protein 7.2 7.0 6.8   Sodium 138 138 141   Potassium 4.8 4.9 5.1   CO2 23 23 23   Chloride 105 104 107   BUN 29 H 16 19   Creatinine 1.0 0.9 1.0   Alkaline Phosphatase 85 79 92   ALT 17 25 26   AST 17 22 18   Total Bilirubin 0.4 0.4 0.2     URINALYSIS:  Recent Labs   Lab 06/09/22  1214   Color, UA Yellow   Specific Gravity, UA 1.020   pH, UA 5.0   Protein, UA Negative   Nitrite, UA Negative   Leukocytes, UA Negative      LIPIDS:  Recent Labs   Lab 09/17/21  0809 12/09/21  0708 06/09/22  1025   TSH 3.232 3.763 3.210   HDL 45 54 41   Cholesterol 188 220 H 240 H   Triglycerides 95 227 H 209 H   LDL Cholesterol 124.0 120.6 157.2   HDL/Cholesterol Ratio 23.9 24.5 17.1 L   Non-HDL Cholesterol 143 166 199   Total Cholesterol/HDL Ratio 4.2 4.1 5.9 H     TSH:  Recent Labs   Lab 09/17/21  0809 12/09/21  0708 06/09/22  1025   TSH 3.232 3.763 3.210       A1C:  Recent Labs   Lab 11/01/19  0754 11/11/20  0807 09/17/21  0809 06/09/22  1025   Hemoglobin A1C 5.7 H 5.5 5.7 H 5.8 H       Imaging:  CT Cardiac Scoring  Narrative: EXAMINATION:  CT CALCIUM SCORING CARDIAC    CLINICAL HISTORY:  CAD risk, high, asymptomatic;  Pure hypercholesterolemia, unspecified    TECHNIQUE:  The chest was surveyed from above the pulmonary valve through the posterior wall of the left  ventricle.  Images were reviewed in axial targeted reformatted images centered on the heart, and in full field of view for axial, sagittal and coronal planes.    COMPARISON:  Chest radiograph 11/16/2013    FINDINGS:  Quantitative assessment of coronary artery calcium was performed with dedicated technique including full field of view and targeted reformatted images.  Xray dose: DLP = 101.63 mGy-cm.    Agatston score equals 2.    In this 69-year old male this score translates to the less than 10th percentile for coronary calcium load according to age and gender. Standard interpretation for an Agatston calcium score of 2 follows:    This indicates minimal plaque burden.  Low CVD risk.    Clinical interpretation: Significant CAD very unlikely.    Additional findings:    There is a 3 mm calcified granuloma in the posterior basal segment of the left lower lobe (axial MIPS series 303, image 37) consistent with calcified granuloma.    The accompanying full field of view images, acquired from the pulmonary valve through the posterior wall the left ventricle, reveal no convincing evidence of pulmonary disease, pleural disease, lymph node enlargement, cardiac decompensation, pneumothorax, pneumomediastinum, pneumoperitoneum, or subcutaneous emphysema.  Impression: In this 69-year-old male with Agatston score of 2, recommendation is to discuss general public health guidelines for primary CVD prevention.    Tiny calcified granuloma in the posterior basal segment of the left lower lobe reflects old granulomatous infection.    Electronically signed by resident: Rafael Ness  Date:    10/23/2019  Time:    13:40    Electronically signed by: Jeanne Mejias MD  Date:    10/23/2019  Time:    14:19      Assessment:       1. Post-viral cough syndrome    2. Primary hypertension            Plan:       Riky was seen today for cough and nasal congestion.    Diagnoses and all orders for this visit:    Post-viral cough syndrome  -      benzonatate (TESSALON) 200 MG capsule; Take 1 capsule (200 mg total) by mouth 3 (three) times daily as needed for Cough.  -     promethazine (PHENERGAN) 6.25 mg/5 mL syrup; Take 5 mLs (6.25 mg total) by mouth every 6 (six) hours as needed (cough).  - use Mucinex DM twice a day  - continue Flonase and Claritin as needed.  Saline spray will also help.  - no signs of bacterial infection at this moment.    Primary hypertension   - stable blood pressure.  If symptoms persist and nothing else helps low-dose decongestants could be considered as his blood pressure looks controlled.      Health Maintenance Due   Topic Date Due    Shingles Vaccine (2 of 3) 05/22/2018        Return to clinic as needed.    Marlen Cason MD  [facility]  Disclaimer:  This note has been generated using voice-recognition software. There may be grammatical or spelling errors that have been missed during proof-reading

## 2022-10-07 ENCOUNTER — PATIENT MESSAGE (OUTPATIENT)
Dept: DERMATOLOGY | Facility: CLINIC | Age: 72
End: 2022-10-07
Payer: MEDICARE

## 2022-10-18 ENCOUNTER — OFFICE VISIT (OUTPATIENT)
Dept: FAMILY MEDICINE | Facility: CLINIC | Age: 72
End: 2022-10-18
Payer: MEDICARE

## 2022-10-18 VITALS
HEART RATE: 81 BPM | HEIGHT: 72 IN | BODY MASS INDEX: 32.49 KG/M2 | OXYGEN SATURATION: 97 % | WEIGHT: 239.88 LBS | SYSTOLIC BLOOD PRESSURE: 130 MMHG | DIASTOLIC BLOOD PRESSURE: 70 MMHG

## 2022-10-18 DIAGNOSIS — I10 PRIMARY HYPERTENSION: Primary | ICD-10-CM

## 2022-10-18 DIAGNOSIS — R05.3 CHRONIC COUGH: ICD-10-CM

## 2022-10-18 PROCEDURE — 99214 OFFICE O/P EST MOD 30 MIN: CPT | Mod: PBBFAC,PO | Performed by: FAMILY MEDICINE

## 2022-10-18 PROCEDURE — 99215 OFFICE O/P EST HI 40 MIN: CPT | Mod: S$PBB,,, | Performed by: FAMILY MEDICINE

## 2022-10-18 PROCEDURE — 99999 PR PBB SHADOW E&M-EST. PATIENT-LVL IV: ICD-10-PCS | Mod: PBBFAC,,, | Performed by: FAMILY MEDICINE

## 2022-10-18 PROCEDURE — 99215 PR OFFICE/OUTPT VISIT, EST, LEVL V, 40-54 MIN: ICD-10-PCS | Mod: S$PBB,,, | Performed by: FAMILY MEDICINE

## 2022-10-18 PROCEDURE — 99999 PR PBB SHADOW E&M-EST. PATIENT-LVL IV: CPT | Mod: PBBFAC,,, | Performed by: FAMILY MEDICINE

## 2022-10-18 RX ORDER — PREDNISONE 5 MG/1
5 TABLET ORAL 2 TIMES DAILY
Qty: 10 TABLET | Refills: 0 | Status: SHIPPED | OUTPATIENT
Start: 2022-10-18 | End: 2022-10-23

## 2022-10-18 RX ORDER — FLUTICASONE PROPIONATE AND SALMETEROL 250; 50 UG/1; UG/1
1 POWDER RESPIRATORY (INHALATION) 2 TIMES DAILY
Qty: 60 EACH | Refills: 0 | Status: SHIPPED | OUTPATIENT
Start: 2022-10-18 | End: 2023-04-11

## 2022-10-18 RX ORDER — BENZONATATE 100 MG/1
100 CAPSULE ORAL 3 TIMES DAILY PRN
Qty: 40 CAPSULE | Refills: 0 | Status: SHIPPED | OUTPATIENT
Start: 2022-10-18 | End: 2022-11-17

## 2022-10-18 NOTE — PROGRESS NOTES
Subjective:       Patient ID: Riky Salcedo is a 72 y.o. male.    Chief Complaint: Cough    72 years old male came to the clinic with cough for the last couple of months after previous COVID infection.  Patient with dry cough no productive.  He reports previous smoking history for a couple years.  Blood pressure today was stable.    Cough  Pertinent negatives include no chest pain, headaches, rhinorrhea or wheezing.   Review of Systems   Constitutional: Negative.  Negative for activity change and unexpected weight change.   HENT: Negative.  Negative for hearing loss, rhinorrhea and trouble swallowing.    Eyes: Negative.  Negative for discharge and visual disturbance.   Respiratory:  Positive for cough. Negative for chest tightness and wheezing.    Cardiovascular: Negative.  Negative for chest pain, palpitations, leg swelling and claudication.   Gastrointestinal: Negative.  Negative for blood in stool, constipation, diarrhea and vomiting.   Endocrine: Negative for polydipsia and polyuria.   Genitourinary: Negative.  Negative for difficulty urinating, hematuria and urgency.   Musculoskeletal: Negative.  Negative for arthralgias, joint swelling and neck pain.   Integumentary:  Negative.   Neurological: Negative.  Negative for weakness and headaches.   Psychiatric/Behavioral: Negative.  Negative for confusion and dysphoric mood.        Objective:      Physical Exam  Vitals and nursing note reviewed.   Constitutional:       General: He is not in acute distress.     Appearance: He is well-developed. He is not diaphoretic.   HENT:      Head: Normocephalic and atraumatic.      Right Ear: External ear normal.      Left Ear: External ear normal.      Nose: Nose normal.      Mouth/Throat:      Pharynx: No oropharyngeal exudate.   Eyes:      General: No scleral icterus.        Right eye: No discharge.         Left eye: No discharge.      Conjunctiva/sclera: Conjunctivae normal.      Pupils: Pupils are equal, round, and  reactive to light.   Neck:      Thyroid: No thyromegaly.      Vascular: No JVD.      Trachea: No tracheal deviation.   Cardiovascular:      Rate and Rhythm: Normal rate and regular rhythm.      Heart sounds: Normal heart sounds. No murmur heard.    No friction rub. No gallop.   Pulmonary:      Effort: Pulmonary effort is normal. No respiratory distress.      Breath sounds: Normal breath sounds. No stridor. No wheezing or rales.   Chest:      Chest wall: No tenderness.   Abdominal:      General: Bowel sounds are normal. There is no distension.      Palpations: Abdomen is soft. There is no mass.      Tenderness: There is no abdominal tenderness. There is no guarding or rebound.   Musculoskeletal:         General: No tenderness. Normal range of motion.      Cervical back: Normal range of motion and neck supple.   Lymphadenopathy:      Cervical: No cervical adenopathy.   Skin:     General: Skin is warm and dry.      Coloration: Skin is not pale.      Findings: No erythema or rash.   Neurological:      Mental Status: He is alert and oriented to person, place, and time.      Cranial Nerves: No cranial nerve deficit.      Motor: No abnormal muscle tone.      Coordination: Coordination normal.      Deep Tendon Reflexes: Reflexes are normal and symmetric. Reflexes normal.   Psychiatric:         Behavior: Behavior normal.         Thought Content: Thought content normal.         Judgment: Judgment normal.       Assessment:       Problem List Items Addressed This Visit       Hypertension - Primary     Other Visit Diagnoses       Chronic cough        Relevant Medications    predniSONE (DELTASONE) 5 MG tablet    benzonatate (TESSALON) 100 MG capsule    fluticasone-salmeterol diskus inhaler 250-50 mcg    Other Relevant Orders    X-Ray Chest PA And Lateral              Plan:         Riky was seen today for cough.    Diagnoses and all orders for this visit:    Primary hypertension    Chronic cough  -     predniSONE (DELTASONE) 5 MG  tablet; Take 1 tablet (5 mg total) by mouth 2 (two) times daily. for 5 days  -     benzonatate (TESSALON) 100 MG capsule; Take 1 capsule (100 mg total) by mouth 3 (three) times daily as needed for Cough.  -     X-Ray Chest PA And Lateral; Future  -     fluticasone-salmeterol diskus inhaler 250-50 mcg; Inhale 1 puff into the lungs 2 (two) times daily.     Continue monitoring blood pressure at home, low sodium diet.

## 2022-10-18 NOTE — PROGRESS NOTES
Answers submitted by the patient for this visit:  Review of Systems Questionnaire (Submitted on 10/13/2022)  activity change: No  unexpected weight change: No  neck pain: No  hearing loss: No  rhinorrhea: No  trouble swallowing: No  eye discharge: No  visual disturbance: No  chest tightness: No  wheezing: No  chest pain: No  palpitations: No  blood in stool: No  constipation: No  vomiting: No  diarrhea: No  polydipsia: No  polyuria: No  difficulty urinating: No  urgency: No  hematuria: No  joint swelling: No  arthralgias: No  headaches: No  weakness: No  confusion: No  dysphoric mood: No

## 2022-10-19 ENCOUNTER — HOSPITAL ENCOUNTER (OUTPATIENT)
Dept: RADIOLOGY | Facility: HOSPITAL | Age: 72
Discharge: HOME | End: 2022-10-19
Attending: FAMILY MEDICINE
Payer: MEDICARE

## 2022-10-19 DIAGNOSIS — R05.3 CHRONIC COUGH: ICD-10-CM

## 2022-10-19 PROCEDURE — 71046 X-RAY EXAM CHEST 2 VIEWS: CPT | Mod: 26,,, | Performed by: RADIOLOGY

## 2022-10-19 PROCEDURE — 71046 XR CHEST PA AND LATERAL: ICD-10-PCS | Mod: 26,,, | Performed by: RADIOLOGY

## 2022-10-19 PROCEDURE — 71046 X-RAY EXAM CHEST 2 VIEWS: CPT | Mod: TC,FY,PO

## 2022-10-21 ENCOUNTER — TELEPHONE (OUTPATIENT)
Dept: DERMATOLOGY | Facility: CLINIC | Age: 72
End: 2022-10-21
Payer: MEDICARE

## 2022-10-21 NOTE — TELEPHONE ENCOUNTER
----- Message from Merissa Arreola sent at 10/21/2022 10:06 AM CDT -----  Regarding: speak with nurse  Contact: patient  [phone] please call patient need to speak with nurse chand to setup laser treatment or send e-mail said this would be a consult waiting on a call back thanks.

## 2022-10-24 ENCOUNTER — TELEPHONE (OUTPATIENT)
Dept: DERMATOLOGY | Facility: CLINIC | Age: 72
End: 2022-10-24
Payer: MEDICARE

## 2022-10-24 NOTE — TELEPHONE ENCOUNTER
Pt is going to keep Nov 11th appt----- Message from Michelle Juárez sent at 10/24/2022  2:53 PM CDT -----  Regarding: Appointment  Contact: [phone]  Pt was calling to see if the appointment on 10/25 @ 9:15 is still available. Please call to discuss further @ [phone]

## 2022-10-25 ENCOUNTER — OFFICE VISIT (OUTPATIENT)
Dept: SURGERY | Facility: CLINIC | Age: 72
End: 2022-10-25
Payer: MEDICARE

## 2022-10-25 ENCOUNTER — LAB VISIT (OUTPATIENT)
Dept: LAB | Facility: HOSPITAL | Age: 72
End: 2022-10-25
Payer: MEDICARE

## 2022-10-25 VITALS
HEIGHT: 72 IN | HEART RATE: 79 BPM | WEIGHT: 236.13 LBS | DIASTOLIC BLOOD PRESSURE: 73 MMHG | BODY MASS INDEX: 31.98 KG/M2 | OXYGEN SATURATION: 96 % | SYSTOLIC BLOOD PRESSURE: 152 MMHG

## 2022-10-25 DIAGNOSIS — D17.1 LIPOMA OF BACK: ICD-10-CM

## 2022-10-25 DIAGNOSIS — D17.1 LIPOMA OF BACK: Primary | ICD-10-CM

## 2022-10-25 LAB
CREAT SERPL-MCNC: 1 MG/DL (ref 0.5–1.4)
EST. GFR  (NO RACE VARIABLE): >60 ML/MIN/1.73 M^2

## 2022-10-25 PROCEDURE — 36415 COLL VENOUS BLD VENIPUNCTURE: CPT | Performed by: STUDENT IN AN ORGANIZED HEALTH CARE EDUCATION/TRAINING PROGRAM

## 2022-10-25 PROCEDURE — 99999 PR PBB SHADOW E&M-EST. PATIENT-LVL III: ICD-10-PCS | Mod: PBBFAC,,, | Performed by: STUDENT IN AN ORGANIZED HEALTH CARE EDUCATION/TRAINING PROGRAM

## 2022-10-25 PROCEDURE — 99999 PR PBB SHADOW E&M-EST. PATIENT-LVL III: CPT | Mod: PBBFAC,,, | Performed by: STUDENT IN AN ORGANIZED HEALTH CARE EDUCATION/TRAINING PROGRAM

## 2022-10-25 PROCEDURE — 99203 PR OFFICE/OUTPT VISIT, NEW, LEVL III, 30-44 MIN: ICD-10-PCS | Mod: S$PBB,,, | Performed by: STUDENT IN AN ORGANIZED HEALTH CARE EDUCATION/TRAINING PROGRAM

## 2022-10-25 PROCEDURE — 82565 ASSAY OF CREATININE: CPT | Performed by: STUDENT IN AN ORGANIZED HEALTH CARE EDUCATION/TRAINING PROGRAM

## 2022-10-25 PROCEDURE — 99203 OFFICE O/P NEW LOW 30 MIN: CPT | Mod: S$PBB,,, | Performed by: STUDENT IN AN ORGANIZED HEALTH CARE EDUCATION/TRAINING PROGRAM

## 2022-10-25 PROCEDURE — 99213 OFFICE O/P EST LOW 20 MIN: CPT | Mod: PBBFAC | Performed by: STUDENT IN AN ORGANIZED HEALTH CARE EDUCATION/TRAINING PROGRAM

## 2022-10-25 NOTE — PROGRESS NOTES
General Surgery Office Visit   History and Physical    Patient Name: Riky Salcedo  YOB: 1950 (72 y.o.)  MRN: [id number]  Today's Date: 10/27/2022    Referring Md:   No referring provider defined for this encounter.    SUBJECTIVE:     Chief Complaint: Back mass    History of Present Illness:  Riky Salcedo is a 72 y.o. male with PMHx of HTN, HLD, alcohol abuse who presents to the clinic today complaining of a mass to his back which he first noticed over 20 years ago. He reports that it has become larger over time and recently started to bother him. Mass is painful with pressure especially with long drives. Feels sharp, no radiation. No other known aggravating or alleviating factors. Denies redness or drainage ever from the area. He denies fever, chills, unintentional weight loss, diaphoresis, n/v/d, constipation, hematochezia, dysuria, hematuria, CP, SOB, and all other symptoms. Patient reports being compliant with home medication regimen.     Patient denies personal history of MI, CVA, lung disease, DM  Daily ETOH use with beer (previously multiple drinks of hard liqueur daily). Former smoker. Denies elicit drug use.   Not currently on any anticoagulants      Review of patient's allergies indicates:  No Known Allergies    Past Medical History:   Diagnosis Date    Cataract     Fever blister     HSV-1 (herpes simplex virus 1) infection     Hyperlipidemia     Hypertension     Keloid cicatrix     Low serum testosterone level     Obese     Peyronie's disease      Past Surgical History:   Procedure Laterality Date    COLONOSCOPY N/A 8/5/2016    Procedure: COLONOSCOPY;  Surgeon: Denis Herzog MD;  Location: [address]);  Service: Endoscopy;  Laterality: N/A;  has hemorrhoid that may need to be banded    COLONOSCOPY N/A 7/29/2021    Procedure: COLONOSCOPY;  Surgeon: Deejay Corley MD;  Location: [facility] ([address] [place] [address]);  Service: Endoscopy;  Laterality: N/A;  Pt. is fully vaccinated.EC     HERNIA REPAIR      UMBILICAL HERNIA REPAIR       Family History   Problem Relation Age of Onset    Heart disease Mother     Heart disease Father     Hypertension Father     Heart disease Paternal Grandmother     Hypertension Paternal Grandmother     Colon cancer Neg Hx     Crohn's disease Neg Hx     Ulcerative colitis Neg Hx     Melanoma Neg Hx     Prostate cancer Neg Hx     Kidney disease Neg Hx      Social History     Tobacco Use    Smoking status: Former     Types: Cigars    Smokeless tobacco: Former   Substance Use Topics    Alcohol use: Yes     Alcohol/week: 4.2 standard drinks     Types: 5 Standard drinks or equivalent per week     Comment: 4-5 beers a day    Drug use: No        Review of Systems:  Review of Systems   Constitutional:  Negative for chills and fever.   Respiratory:  Negative for cough and shortness of breath.    Cardiovascular:  Negative for chest pain.   Gastrointestinal:  Negative for abdominal pain, nausea and vomiting.   Genitourinary:  Negative for dysuria and hematuria.   Musculoskeletal:  Negative for falls.   Skin:  Negative for rash.        (+) mass on back   Neurological:  Negative for dizziness and headaches.   Psychiatric/Behavioral:  Positive for substance abuse. The patient is not nervous/anxious.    All other systems reviewed and are negative.    OBJECTIVE:     Vital Signs (Most Recent)  BP (!) 152/73 (BP Location: Left arm, Patient Position: Sitting)   Pulse 79   Ht 6' (1.829 m)   Wt 107.1 kg (236 lb 1.8 oz)   SpO2 96%   BMI 32.02 kg/m²     Physical Exam  Vitals and nursing note reviewed.   Constitutional:       General: He is not in acute distress.     Appearance: He is not ill-appearing or diaphoretic.      Comments: Room air   HENT:      Head: Normocephalic and atraumatic.      Mouth/Throat:      Mouth: Mucous membranes are moist.      Pharynx: Oropharynx is clear.   Eyes:      Extraocular Movements: Extraocular movements intact.      Conjunctiva/sclera: Conjunctivae  normal.   Cardiovascular:      Rate and Rhythm: Normal rate.   Pulmonary:      Effort: Pulmonary effort is normal. No respiratory distress.   Abdominal:      General: There is no distension.      Palpations: Abdomen is soft.   Musculoskeletal:         General: No deformity.   Skin:     General: Skin is warm and dry.      Coloration: Skin is not jaundiced.          Neurological:      Mental Status: He is alert and oriented to person, place, and time.       ASSESSMENT/PLAN:     Riky Salcedo is a 72 y.o. male with PMHx of HTN, HLD, alcohol abuse who presents to the clinic today complaining of a mass to his back concerning for lipoma. Clinically stable without signs of infection or B symptoms    Riky was seen today for follow-up.    Diagnoses and all orders for this visit:    Lipoma of back  -     CT Chest Abdomen With Contrast (XPD); Future  -     CREATININE, SERUM; Future    - Discussed risks and benefits of both operative and nonoperative management with patient in detail. Patient elected for operative management. Will obtain CT chest abdomen for further eval and operative planning  - ED precautions given  - Continue any current medications  - Call with any questions or concerns  - Discussed POC with patient. All questions were answered. Patient is agreeable to plan and verbalized understanding.     Case discussed with Dr. Madrid. Patient seen and examined by Dr. Madrid.      Jon Malone, MPAS, PA-C  General Surgery  - [facility]

## 2022-10-27 ENCOUNTER — HOSPITAL ENCOUNTER (OUTPATIENT)
Dept: RADIOLOGY | Facility: HOSPITAL | Age: 72
Discharge: HOME | End: 2022-10-27
Attending: STUDENT IN AN ORGANIZED HEALTH CARE EDUCATION/TRAINING PROGRAM
Payer: MEDICARE

## 2022-10-27 DIAGNOSIS — D17.1 LIPOMA OF BACK: ICD-10-CM

## 2022-10-27 PROCEDURE — 25500020 PHARM REV CODE 255: Performed by: STUDENT IN AN ORGANIZED HEALTH CARE EDUCATION/TRAINING PROGRAM

## 2022-10-27 PROCEDURE — 71260 CT THORAX DX C+: CPT | Mod: 26,,, | Performed by: RADIOLOGY

## 2022-10-27 PROCEDURE — 71260 CT CHEST ABDOMEN WITH CONTRAST (XPD): ICD-10-PCS | Mod: 26,,, | Performed by: RADIOLOGY

## 2022-10-27 PROCEDURE — 74160 CT ABDOMEN W/CONTRAST: CPT | Mod: 26,,, | Performed by: RADIOLOGY

## 2022-10-27 PROCEDURE — 74160 CT CHEST ABDOMEN WITH CONTRAST (XPD): ICD-10-PCS | Mod: 26,,, | Performed by: RADIOLOGY

## 2022-10-27 PROCEDURE — 74160 CT ABDOMEN W/CONTRAST: CPT | Mod: TC

## 2022-10-27 RX ADMIN — IOHEXOL 100 ML: 350 INJECTION, SOLUTION INTRAVENOUS at 01:10

## 2022-11-02 ENCOUNTER — TELEPHONE (OUTPATIENT)
Dept: SURGERY | Facility: CLINIC | Age: 72
End: 2022-11-02
Payer: MEDICARE

## 2022-11-02 NOTE — TELEPHONE ENCOUNTER
Left message on patient's voicemail for him to call back to discuss recent CT Scan and discuss a plan of care for his back lipoma excision.  Per Dr. Madrid, he will need excision of the lipoma in the OR.  Direct phone number given for him to call back.

## 2022-11-04 ENCOUNTER — TELEPHONE (OUTPATIENT)
Dept: SURGERY | Facility: CLINIC | Age: 72
End: 2022-11-04
Payer: MEDICARE

## 2022-11-04 ENCOUNTER — PATIENT MESSAGE (OUTPATIENT)
Dept: SURGERY | Facility: CLINIC | Age: 72
End: 2022-11-04
Payer: MEDICARE

## 2022-11-04 DIAGNOSIS — D17.1 LIPOMA OF BACK: Primary | ICD-10-CM

## 2022-11-04 NOTE — TELEPHONE ENCOUNTER
Spoke with patient and scheduled back lipoma excision in the OR for 11/30/22 with Dr. Madrid.  Pre-Op instructions sent to his My Chart and mailed to him.  He verbalized understanding and is agreeable to this plan of care.

## 2022-11-11 ENCOUNTER — PROCEDURE VISIT (OUTPATIENT)
Dept: DERMATOLOGY | Facility: CLINIC | Age: 72
End: 2022-11-11
Payer: MEDICARE

## 2022-11-11 DIAGNOSIS — Z41.1 ENCOUNTER FOR COSMETIC LASER PROCEDURE: Primary | ICD-10-CM

## 2022-11-11 DIAGNOSIS — I78.1 TELANGIECTASIA: ICD-10-CM

## 2022-11-11 PROCEDURE — 17999 PR V BEAM,  25-50 PULSES: ICD-10-PCS | Mod: CSM,S$GLB,, | Performed by: DERMATOLOGY

## 2022-11-11 PROCEDURE — 99499 NO LOS: ICD-10-PCS | Mod: CSM,S$GLB,, | Performed by: DERMATOLOGY

## 2022-11-11 PROCEDURE — 17999 UNLISTD PX SKN MUC MEMB SUBQ: CPT | Mod: CSM,S$GLB,, | Performed by: DERMATOLOGY

## 2022-11-11 PROCEDURE — 99499 UNLISTED E&M SERVICE: CPT | Mod: CSM,S$GLB,, | Performed by: DERMATOLOGY

## 2022-11-11 NOTE — PROGRESS NOTES
Riky Salcedo is a 72 y.o. male who is here today for consult for laser treatment of telangiectasias on face.   Discussed risks, benefits, alternatives, and side effects of pulse dye laser (PDL).  The patient was given the opportunity to ask any questions, and all questions were answered to the patient's satisfaction.  The patient verbalized understanding, elected to proceed, and signed a written informed consent.

## 2022-11-29 ENCOUNTER — TELEPHONE (OUTPATIENT)
Dept: SURGERY | Facility: CLINIC | Age: 72
End: 2022-11-29
Payer: MEDICARE

## 2022-11-30 ENCOUNTER — ANESTHESIA EVENT (OUTPATIENT)
Dept: SURGERY | Facility: HOSPITAL | Age: 72
End: 2022-11-30
Payer: MEDICARE

## 2022-11-30 ENCOUNTER — ANESTHESIA (OUTPATIENT)
Dept: SURGERY | Facility: HOSPITAL | Age: 72
End: 2022-11-30
Payer: MEDICARE

## 2022-11-30 ENCOUNTER — HOSPITAL ENCOUNTER (OUTPATIENT)
Facility: HOSPITAL | Age: 72
Discharge: HOME | End: 2022-11-30
Attending: SURGERY | Admitting: SURGERY
Payer: MEDICARE

## 2022-11-30 VITALS
BODY MASS INDEX: 31.83 KG/M2 | WEIGHT: 235 LBS | RESPIRATION RATE: 17 BRPM | HEART RATE: 79 BPM | TEMPERATURE: 98 F | DIASTOLIC BLOOD PRESSURE: 65 MMHG | SYSTOLIC BLOOD PRESSURE: 132 MMHG | OXYGEN SATURATION: 95 % | HEIGHT: 72 IN

## 2022-11-30 DIAGNOSIS — D17.1 LIPOMA OF BACK: Primary | ICD-10-CM

## 2022-11-30 PROCEDURE — 63600175 PHARM REV CODE 636 W HCPCS: Performed by: NURSE ANESTHETIST, CERTIFIED REGISTERED

## 2022-11-30 PROCEDURE — 88304 PR  SURG PATH,LEVEL III: ICD-10-PCS | Mod: 26,,, | Performed by: PATHOLOGY

## 2022-11-30 PROCEDURE — 21933 PR EXC TUMOR SOFT TISS BACK/FLANK SUBFASCIAL 5+CM: ICD-10-PCS | Mod: ,,, | Performed by: SURGERY

## 2022-11-30 PROCEDURE — D9220A PRA ANESTHESIA: Mod: CRNA,,, | Performed by: NURSE ANESTHETIST, CERTIFIED REGISTERED

## 2022-11-30 PROCEDURE — 88304 TISSUE EXAM BY PATHOLOGIST: CPT | Performed by: PATHOLOGY

## 2022-11-30 PROCEDURE — 37000008 HC ANESTHESIA 1ST 15 MINUTES: Performed by: SURGERY

## 2022-11-30 PROCEDURE — 37000009 HC ANESTHESIA EA ADD 15 MINS: Performed by: SURGERY

## 2022-11-30 PROCEDURE — D9220A PRA ANESTHESIA: ICD-10-PCS | Mod: CRNA,,, | Performed by: NURSE ANESTHETIST, CERTIFIED REGISTERED

## 2022-11-30 PROCEDURE — 88304 TISSUE EXAM BY PATHOLOGIST: CPT | Mod: 26,,, | Performed by: PATHOLOGY

## 2022-11-30 PROCEDURE — D9220A PRA ANESTHESIA: ICD-10-PCS | Mod: ANES,,, | Performed by: ANESTHESIOLOGY

## 2022-11-30 PROCEDURE — 25000003 PHARM REV CODE 250: Performed by: SURGERY

## 2022-11-30 PROCEDURE — D9220A PRA ANESTHESIA: Mod: ANES,,, | Performed by: ANESTHESIOLOGY

## 2022-11-30 PROCEDURE — 36000706: Performed by: SURGERY

## 2022-11-30 PROCEDURE — 36000707: Performed by: SURGERY

## 2022-11-30 PROCEDURE — 25000003 PHARM REV CODE 250: Performed by: NURSE ANESTHETIST, CERTIFIED REGISTERED

## 2022-11-30 PROCEDURE — 71000015 HC POSTOP RECOV 1ST HR: Performed by: SURGERY

## 2022-11-30 PROCEDURE — 71000016 HC POSTOP RECOV ADDL HR: Performed by: SURGERY

## 2022-11-30 PROCEDURE — 21933 EXC BACK TUM DEEP 5 CM/>: CPT | Mod: ,,, | Performed by: SURGERY

## 2022-11-30 PROCEDURE — 71000044 HC DOSC ROUTINE RECOVERY FIRST HOUR: Performed by: SURGERY

## 2022-11-30 RX ORDER — ROCURONIUM BROMIDE 10 MG/ML
INJECTION, SOLUTION INTRAVENOUS
Status: DISCONTINUED | OUTPATIENT
Start: 2022-11-30 | End: 2022-11-30

## 2022-11-30 RX ORDER — DEXTROMETHORPHAN HYDROBROMIDE, GUAIFENESIN 5; 100 MG/5ML; MG/5ML
650 LIQUID ORAL EVERY 8 HOURS PRN
Refills: 0
Start: 2022-11-30 | End: 2024-01-22

## 2022-11-30 RX ORDER — KETOROLAC TROMETHAMINE 30 MG/ML
INJECTION, SOLUTION INTRAMUSCULAR; INTRAVENOUS
Status: DISCONTINUED | OUTPATIENT
Start: 2022-11-30 | End: 2022-11-30

## 2022-11-30 RX ORDER — OXYCODONE HYDROCHLORIDE 5 MG/1
5 TABLET ORAL EVERY 4 HOURS PRN
Qty: 10 TABLET | Refills: 0 | Status: SHIPPED | OUTPATIENT
Start: 2022-11-30 | End: 2023-04-11

## 2022-11-30 RX ORDER — OXYCODONE HYDROCHLORIDE 5 MG/1
5 TABLET ORAL ONCE AS NEEDED
Status: DISCONTINUED | OUTPATIENT
Start: 2022-11-30 | End: 2022-11-30 | Stop reason: HOSPADM

## 2022-11-30 RX ORDER — SODIUM CHLORIDE 9 MG/ML
INJECTION, SOLUTION INTRAVENOUS CONTINUOUS
Status: DISCONTINUED | OUTPATIENT
Start: 2022-11-30 | End: 2022-11-30 | Stop reason: HOSPADM

## 2022-11-30 RX ORDER — ONDANSETRON 2 MG/ML
INJECTION INTRAMUSCULAR; INTRAVENOUS
Status: DISCONTINUED | OUTPATIENT
Start: 2022-11-30 | End: 2022-11-30

## 2022-11-30 RX ORDER — CEFAZOLIN SODIUM/WATER 2 G/20 ML
2 SYRINGE (ML) INTRAVENOUS
Status: DISCONTINUED | OUTPATIENT
Start: 2022-11-30 | End: 2022-11-30 | Stop reason: HOSPADM

## 2022-11-30 RX ORDER — NEOSTIGMINE METHYLSULFATE 0.5 MG/ML
INJECTION, SOLUTION INTRAVENOUS
Status: DISCONTINUED | OUTPATIENT
Start: 2022-11-30 | End: 2022-11-30

## 2022-11-30 RX ORDER — LIDOCAINE HYDROCHLORIDE 10 MG/ML
INJECTION, SOLUTION INTRAVENOUS
Status: DISCONTINUED | OUTPATIENT
Start: 2022-11-30 | End: 2022-11-30

## 2022-11-30 RX ORDER — FENTANYL CITRATE 50 UG/ML
INJECTION, SOLUTION INTRAMUSCULAR; INTRAVENOUS
Status: DISCONTINUED | OUTPATIENT
Start: 2022-11-30 | End: 2022-11-30

## 2022-11-30 RX ORDER — BUPIVACAINE HYDROCHLORIDE 5 MG/ML
INJECTION, SOLUTION EPIDURAL; INTRACAUDAL
Status: DISCONTINUED | OUTPATIENT
Start: 2022-11-30 | End: 2022-11-30 | Stop reason: HOSPADM

## 2022-11-30 RX ORDER — CEFAZOLIN SODIUM 1 G/3ML
INJECTION, POWDER, FOR SOLUTION INTRAMUSCULAR; INTRAVENOUS
Status: DISCONTINUED | OUTPATIENT
Start: 2022-11-30 | End: 2022-11-30

## 2022-11-30 RX ORDER — SODIUM CHLORIDE 0.9 % (FLUSH) 0.9 %
3 SYRINGE (ML) INJECTION
Status: DISCONTINUED | OUTPATIENT
Start: 2022-11-30 | End: 2022-11-30 | Stop reason: HOSPADM

## 2022-11-30 RX ORDER — PROPOFOL 10 MG/ML
VIAL (ML) INTRAVENOUS
Status: DISCONTINUED | OUTPATIENT
Start: 2022-11-30 | End: 2022-11-30

## 2022-11-30 RX ORDER — DEXAMETHASONE SODIUM PHOSPHATE 4 MG/ML
INJECTION, SOLUTION INTRA-ARTICULAR; INTRALESIONAL; INTRAMUSCULAR; INTRAVENOUS; SOFT TISSUE
Status: DISCONTINUED | OUTPATIENT
Start: 2022-11-30 | End: 2022-11-30

## 2022-11-30 RX ADMIN — PROPOFOL 40 MG: 10 INJECTION, EMULSION INTRAVENOUS at 08:11

## 2022-11-30 RX ADMIN — DEXAMETHASONE SODIUM PHOSPHATE 4 MG: 4 INJECTION, SOLUTION INTRAMUSCULAR; INTRAVENOUS at 08:11

## 2022-11-30 RX ADMIN — ROCURONIUM BROMIDE 40 MG: 10 INJECTION INTRAVENOUS at 08:11

## 2022-11-30 RX ADMIN — SODIUM CHLORIDE: 9 INJECTION, SOLUTION INTRAVENOUS at 08:11

## 2022-11-30 RX ADMIN — FENTANYL CITRATE 100 MCG: 50 INJECTION, SOLUTION INTRAMUSCULAR; INTRAVENOUS at 08:11

## 2022-11-30 RX ADMIN — KETOROLAC TROMETHAMINE 30 MG: 30 INJECTION, SOLUTION INTRAMUSCULAR; INTRAVENOUS at 09:11

## 2022-11-30 RX ADMIN — FENTANYL CITRATE 25 MCG: 50 INJECTION, SOLUTION INTRAMUSCULAR; INTRAVENOUS at 09:11

## 2022-11-30 RX ADMIN — GLYCOPYRROLATE 0.8 MG: 0.2 INJECTION, SOLUTION INTRAMUSCULAR; INTRAVENOUS at 09:11

## 2022-11-30 RX ADMIN — CEFAZOLIN 2 G: 330 INJECTION, POWDER, FOR SOLUTION INTRAMUSCULAR; INTRAVENOUS at 08:11

## 2022-11-30 RX ADMIN — PROPOFOL 200 MG: 10 INJECTION, EMULSION INTRAVENOUS at 08:11

## 2022-11-30 RX ADMIN — NEOSTIGMINE METHYLSULFATE 5 MG: 0.5 INJECTION, SOLUTION INTRAVENOUS at 09:11

## 2022-11-30 RX ADMIN — LIDOCAINE HYDROCHLORIDE 80 MG: 10 INJECTION, SOLUTION INTRAVENOUS at 08:11

## 2022-11-30 RX ADMIN — ONDANSETRON 4 MG: 2 INJECTION INTRAMUSCULAR; INTRAVENOUS at 09:11

## 2022-11-30 NOTE — BRIEF OP NOTE
Gumaro Rao - Surgery ([facility])  Brief Operative Note    Surgery Date: 11/30/2022     Surgeon(s) and Role:     * Kell Madrid MD - Primary     * Cary Peres MD - Resident - Assisting        Pre-op Diagnosis:  Lipoma of back [D17.1]    Post-op Diagnosis:  Post-Op Diagnosis Codes:     * Lipoma of back [D17.1]    Procedure(s) (LRB):  EXCISION, LIPOMA Back (N/A)    Anesthesia: General    Operative Findings: Lipoma removed from patient left back/ left flank.Camryn blood loss. No complications. Patient tolerated procedure well. Closed with suture and dermabond applied.  Pressure dressing applied to surgical site to avoid seroma formation.     Estimated Blood Loss: * No values recorded between 11/30/2022  8:22 AM and 11/30/2022 10:00 AM *         Specimens:   Specimen (24h ago, onward)       Start     Ordered    11/30/22 0834  Specimen to Pathology, Surgery General Surgery  Once        Comments: Pre-op Diagnosis: Lipoma of back [D17.1]Procedure(s):EXCISION, LIPOMA Back Number of specimens: 1Name of specimens: Lipoma     References:    Click here for ordering Quick Tip   Question Answer Comment   Procedure Type: General Surgery    Which provider would you like to cc? KELL MADRID    Release to patient Immediate        11/30/22 0834                      Discharge Note    OUTCOME: Patient tolerated treatment/procedure well without complication and is now ready for discharge.    DISPOSITION: Home or Self Care    FINAL DIAGNOSIS:  Hypertension    FOLLOWUP: In clinic in 2 weeks with Dr. Madrid    DISCHARGE INSTRUCTIONS:    Discharge Procedure Orders   Diet Adult Regular     Lifting restrictions     Notify your health care provider if you experience any of the following:  temperature >100.4     Notify your health care provider if you experience any of the following:  persistent nausea and vomiting or diarrhea     Notify your health care provider if you experience any of the following:  severe uncontrolled pain      Notify your health care provider if you experience any of the following:  redness, tenderness, or signs of infection (pain, swelling, redness, odor or green/yellow discharge around incision site)     Notify your health care provider if you experience any of the following:  difficulty breathing or increased cough     Notify your health care provider if you experience any of the following:  severe persistent headache      Remove dressing in 72 hours   Order Comments: You have a pressure dressing on. This dressing is applies pressure to your surgical site in order to avoid a seroma  (subcutaneous accumulation of fluid) in the space the lipoma was removed.     Activity as tolerated     Shower on day dressing removed (No bath)     Weight bearing restrictions (specify):   Order Comments: Avoid bearing weight greater than 15 lb for 6 weeks after surgery

## 2022-11-30 NOTE — ANESTHESIA PROCEDURE NOTES
Intubation    Date/Time: 11/30/2022 8:07 AM  Performed by: Yessy Padilla CRNA  Authorized by: Corky Walker MD     Intubation:     Induction:  Intravenous    Intubated:  Postinduction    Mask Ventilation:  Easy with oral airway    Attempts:  1    Attempted By:  CRNA    Method of Intubation:  Video laryngoscopy    Blade:  Mai 3    Laryngeal View Grade: Grade I - full view of cords      Difficult Airway Encountered?: No      Complications:  None    Airway Device:  Oral endotracheal tube    Airway Device Size:  7.5    Style/Cuff Inflation:  Cuffed (inflated to minimal occlusive pressure)    Secured at:  The lips    Placement Verified By:  Capnometry    Complicating Factors:  Obesity and short neck    Findings Post-Intubation:  BS equal bilateral

## 2022-11-30 NOTE — ANESTHESIA PREPROCEDURE EVALUATION
11/30/2022  Riky Salcedo is a 72 y.o., male.      Pre-op Assessment    I have reviewed the Patient Summary Reports.       I have reviewed the Medications.     Review of Systems  Anesthesia Hx:  No problems with previous Anesthesia  History of prior surgery of interest to airway management or planning: Previous anesthesia: General   Social:  Former Smoker, Social Alcohol Use    Hematology/Oncology:  Hematology Normal   Oncology Normal     EENT/Dental:EENT/Dental Normal   Cardiovascular:   Exercise tolerance: good Hypertension, well controlled    Pulmonary:  Pulmonary Normal    Renal/:  Renal/ Normal     Hepatic/GI:  Hepatic/GI Normal    Musculoskeletal:  Musculoskeletal Normal    Neurological:  Neurology Normal    Endocrine:  Endocrine Normal    Dermatological:  Skin Normal    Psych:  Psychiatric Normal           Physical Exam  General: Well nourished, Cooperative, Alert and Oriented    Airway:  Mallampati: II   Mouth Opening: Normal  TM Distance: Normal  Neck ROM: Normal ROM    Dental:  Intact        Anesthesia Plan  Type of Anesthesia, risks & benefits discussed:    Anesthesia Type: Gen ETT  Intra-op Monitoring Plan: Standard ASA Monitors  Post Op Pain Control Plan: IV/PO Opioids PRN and multimodal analgesia  Induction:  IV  Airway Plan: Direct and Video, Post-Induction  Informed Consent: Informed consent signed with the Patient and all parties understand the risks and agree with anesthesia plan.  All questions answered.   ASA Score: 3    Ready For Surgery From Anesthesia Perspective.     .

## 2022-11-30 NOTE — H&P
General Surgery   History and Physical     Patient Name: Riky Salcedo  YOB: 1950 (72 y.o.)  MRN: [id number]  Today's Date: 10/27/2022     Referring Md:   No referring provider defined for this encounter.     SUBJECTIVE:      Chief Complaint: Back mass     History of Present Illness:  Riky Salcedo is a 72 y.o. male with PMHx of HTN, HLD, alcohol abuse who presents to [facility] for scheduled excision of back mass after he was seen  clinic in clinic on Oct 25,22. He had complaints of a mass on his back which he first noticed over 20 years ago. He reports that it has become larger over time and recently started to bother him. Mass is painful with pressure especially with long drives. Feels sharp, no radiation. No other known aggravating or alleviating factors. Denies redness or drainage ever from the area. He denies fever, chills, unintentional weight loss, diaphoresis, n/v/d, constipation, hematochezia, dysuria, hematuria, CP, SOB, and all other symptoms. Patient reports being compliant with home medication regimen.      Patient denies personal history of MI, CVA, lung disease, DM  Daily ETOH use with beer (previously multiple drinks of hard liqueur daily). Former smoker. Denies elicit drug use.   Not currently on any anticoagulants        Review of patient's allergies indicates:  No Known Allergies          Past Medical History:   Diagnosis Date    Cataract      Fever blister      HSV-1 (herpes simplex virus 1) infection      Hyperlipidemia      Hypertension      Keloid cicatrix      Low serum testosterone level      Obese      Peyronie's disease              Past Surgical History:   Procedure Laterality Date    COLONOSCOPY N/A 8/5/2016     Procedure: COLONOSCOPY;  Surgeon: Denis Herzog MD;  Location: [address];  Service: Endoscopy;  Laterality: N/A;  has hemorrhoid that may need to be banded    COLONOSCOPY N/A 7/29/2021     Procedure: COLONOSCOPY;  Surgeon: Deejay Corley MD;   Location: [facility] ([address] [place] [address]);  Service: Endoscopy;  Laterality: N/A;  Pt. is fully vaccinated.EC    HERNIA REPAIR        UMBILICAL HERNIA REPAIR                Family History   Problem Relation Age of Onset    Heart disease Mother      Heart disease Father      Hypertension Father      Heart disease Paternal Grandmother      Hypertension Paternal Grandmother      Colon cancer Neg Hx      Crohn's disease Neg Hx      Ulcerative colitis Neg Hx      Melanoma Neg Hx      Prostate cancer Neg Hx      Kidney disease Neg Hx        Social History            Tobacco Use    Smoking status: Former       Types: Cigars    Smokeless tobacco: Former   Substance Use Topics    Alcohol use: Yes       Alcohol/week: 4.2 standard drinks       Types: 5 Standard drinks or equivalent per week       Comment: 4-5 beers a day    Drug use: No         Review of Systems:  Review of Systems   Constitutional:  Negative for chills and fever.   Respiratory:  Negative for cough and shortness of breath.    Cardiovascular:  Negative for chest pain.   Gastrointestinal:  Negative for abdominal pain, nausea and vomiting.   Genitourinary:  Negative for dysuria and hematuria.   Musculoskeletal:  Negative for falls.   Skin:  Negative for rash.        (+) mass on back   Neurological:  Negative for dizziness and headaches.   Psychiatric/Behavioral:  Positive for substance abuse. The patient is not nervous/anxious.    All other systems reviewed and are negative.     OBJECTIVE:      Vital Signs (Most Recent)  BP (!) 152/73 (BP Location: Left arm, Patient Position: Sitting)   Pulse 79   Ht 6' (1.829 m)   Wt 107.1 kg (236 lb 1.8 oz)   SpO2 96%   BMI 32.02 kg/m²      Physical Exam  Vitals and nursing note reviewed.   Constitutional:       General: He is not in acute distress.     Appearance: He is not ill-appearing or diaphoretic.      Comments: Room air   HENT:      Head: Normocephalic and atraumatic.      Mouth/Throat:      Mouth: Mucous membranes are moist.       Pharynx: Oropharynx is clear.   Eyes:      Extraocular Movements: Extraocular movements intact.      Conjunctiva/sclera: Conjunctivae normal.   Cardiovascular:      Rate and Rhythm: Normal rate.   Pulmonary:      Effort: Pulmonary effort is normal. No respiratory distress.   Abdominal:      General: There is no distension.      Palpations: Abdomen is soft.   Musculoskeletal:         General: No deformity.   Skin:     General: Skin is warm and dry.      Coloration: Skin is not jaundiced.           Neurological:      Mental Status: He is alert and oriented to person, place, and time.         ASSESSMENT/PLAN:      Riky Salcedo is a 72 y.o. male with PMHx of HTN, HLD, alcohol abuse who presents today complaining of a mass to his back concerning for lipoma. Clinically stable without signs of infection or B symptoms. Plan for surgical removal in the OR, today.        Diagnoses and all orders for this visit:     Lipoma of back     - Discussed risks and benefits of both operative and nonoperative management with patient in detail. Patient elected for operative management.   - Discussed POC with patient. All questions were answered. Patient is agreeable to plan and verbalized understanding.

## 2022-11-30 NOTE — TRANSFER OF CARE
Anesthesia Transfer of Care Note    Patient: Riky Salcedo    Procedure(s) Performed: Procedure(s) (LRB):  EXCISION, LIPOMA Back (N/A)    Patient location: PACU    Anesthesia Type: general    Transport from OR: Transported from OR on 6-10 L/min O2 by face mask with adequate spontaneous ventilation    Post pain: adequate analgesia    Post assessment: no apparent anesthetic complications    Post vital signs: stable    Level of consciousness: awake and alert    Nausea/Vomiting: no nausea/vomiting    Complications: none    Transfer of care protocol was followed      Last vitals:   Visit Vitals  /70   Pulse 70   Temp 36.6 °C (97.9 °F) (Oral)   Resp 16   Ht 6' (1.829 m)   Wt 106.6 kg (235 lb)   SpO2 97%   BMI 31.87 kg/m²

## 2022-11-30 NOTE — PATIENT INSTRUCTIONS
Okay to remove bandages in 72 hours and shower in 48hrs, avoid submerging the incisions in bath tub/pool  Use of narcotics can lead to constipation, recommend taking a stool softener while on medications  Call the office with any questions or concerns   Do not drive while taking narcotic pain medication

## 2022-12-01 NOTE — OP NOTE
DATE: 11/30/22.    PREOPERATIVE DIAGNOSIS:  Large left back lipoma (12 x 12 cm).    POSTOPERATIVE DIAGNOSIS:  Same.      PROCEDURE:  Excision of subfascial left back lipoma (12 x 12 cm).    ATTENDING SURGEON: Clayton Madrid MD.    RESIDENT: Cary Peres Md.    ANESTHESIA:  General.    INDICATION:  Patient is a 72-year-old man who presented with a large left back mass consistent with lipoma that he desired to have excised.    PROCEDURE IN DETAIL:  Patient was taken to the operating room and general endotracheal tube anesthesia was induced.  He was placed in the right lateral decubitus position and his left back was prepped and draped in standard fashion.  Time-out was performed.  Incision was made and was carried through the subcutaneous tissues.  Extension of the latissimus fascia was opened to expose the lipoma.  This was carefully circumferentially dissected with a combination of blunt and cautery dissection.  After complete mobilization, the specimen was removed and passed off the field.  Wound was carefully examined for any remnants of lipoma.  Hemostasis was confirmed.  Fascia was closed with 2-0 Vicryl suture.  Subcutaneous tissue was then closed in layers with 3-0 Vicryl and subcuticular 4-0 Monocryl.  Dermabond was applied.  Patient was awakened from anesthesia and transferred to the PACU in good condition.  All needle and sponge counts were correct at the conclusion of the case.  I was present for the procedure in its entirety.    EBL:  Minimal.    FINDINGS:  Large subfascial back lipoma excised without apparent complication.

## 2022-12-02 NOTE — ANESTHESIA POSTPROCEDURE EVALUATION
Anesthesia Post Evaluation    Patient: Riky Salcedo    Procedure(s) Performed: Procedure(s) (LRB):  EXCISION, LIPOMA Back (N/A)    Final Anesthesia Type: general      Patient location during evaluation: PACU  Patient participation: Yes- Able to Participate  Level of consciousness: awake and alert  Post-procedure vital signs: reviewed and stable  Pain management: adequate  Airway patency: patent    PONV status at discharge: No PONV  Anesthetic complications: no      Cardiovascular status: blood pressure returned to baseline  Respiratory status: unassisted  Hydration status: euvolemic  Follow-up not needed.          Vitals Value Taken Time   /65 11/30/22 1210   Temp 36.8 °C (98.2 °F) 11/30/22 1210   Pulse 76 11/30/22 1214   Resp 17 11/30/22 1210   SpO2 95 % 11/30/22 1214   Vitals shown include unvalidated device data.      No case tracking events are documented in the log.      Pain/Conrad Score: No data recorded

## 2022-12-13 ENCOUNTER — OFFICE VISIT (OUTPATIENT)
Dept: SURGERY | Facility: CLINIC | Age: 72
End: 2022-12-13
Payer: MEDICARE

## 2022-12-13 VITALS
SYSTOLIC BLOOD PRESSURE: 140 MMHG | DIASTOLIC BLOOD PRESSURE: 71 MMHG | OXYGEN SATURATION: 97 % | HEIGHT: 72 IN | HEART RATE: 79 BPM | BODY MASS INDEX: 32.25 KG/M2 | WEIGHT: 238.13 LBS

## 2022-12-13 DIAGNOSIS — Z48.89 POSTOPERATIVE VISIT: Primary | ICD-10-CM

## 2022-12-13 PROCEDURE — 99999 PR PBB SHADOW E&M-EST. PATIENT-LVL III: ICD-10-PCS | Mod: PBBFAC,,, | Performed by: SURGERY

## 2022-12-13 PROCEDURE — 99024 POSTOP FOLLOW-UP VISIT: CPT | Mod: POP,,, | Performed by: SURGERY

## 2022-12-13 PROCEDURE — 99024 PR POST-OP FOLLOW-UP VISIT: ICD-10-PCS | Mod: POP,,, | Performed by: SURGERY

## 2022-12-13 PROCEDURE — 99213 OFFICE O/P EST LOW 20 MIN: CPT | Mod: PBBFAC | Performed by: SURGERY

## 2022-12-13 PROCEDURE — 99999 PR PBB SHADOW E&M-EST. PATIENT-LVL III: CPT | Mod: PBBFAC,,, | Performed by: SURGERY

## 2022-12-13 NOTE — PROGRESS NOTES
HPI:  The patient is status post- left back lipoma excision 2 weeks prior . He has 1/10 pain. He is eating well. The patient denies other symptoms.     PHYSICAL EXAM:  Physical Exam  Incision intact, healing. Mild seroma    ASSESSMENT:    The patient is doing well after surgery.     PLAN:    Follow up PRN.  Activity: regular duty    Clayton Madrid MD  Acute Care Surgery  [facility] [place] Department of Surgery

## 2022-12-14 LAB
FINAL PATHOLOGIC DIAGNOSIS: NORMAL
Lab: NORMAL

## 2023-01-13 ENCOUNTER — OFFICE VISIT (OUTPATIENT)
Dept: FAMILY MEDICINE | Facility: CLINIC | Age: 73
End: 2023-01-13
Payer: MEDICARE

## 2023-01-13 VITALS
SYSTOLIC BLOOD PRESSURE: 136 MMHG | TEMPERATURE: 98 F | HEART RATE: 83 BPM | OXYGEN SATURATION: 97 % | HEIGHT: 72 IN | BODY MASS INDEX: 32.73 KG/M2 | DIASTOLIC BLOOD PRESSURE: 74 MMHG | WEIGHT: 241.63 LBS

## 2023-01-13 DIAGNOSIS — Z51.89 VISIT FOR WOUND CHECK: ICD-10-CM

## 2023-01-13 DIAGNOSIS — T14.8XXA SUPERFICIAL ABRASION: Primary | ICD-10-CM

## 2023-01-13 PROCEDURE — 99214 OFFICE O/P EST MOD 30 MIN: CPT | Mod: PBBFAC,PO | Performed by: FAMILY MEDICINE

## 2023-01-13 PROCEDURE — 99999 PR PBB SHADOW E&M-EST. PATIENT-LVL IV: CPT | Mod: PBBFAC,,, | Performed by: FAMILY MEDICINE

## 2023-01-13 PROCEDURE — 99214 PR OFFICE/OUTPT VISIT, EST, LEVL IV, 30-39 MIN: ICD-10-PCS | Mod: S$PBB,,, | Performed by: FAMILY MEDICINE

## 2023-01-13 PROCEDURE — 99999 PR PBB SHADOW E&M-EST. PATIENT-LVL IV: ICD-10-PCS | Mod: PBBFAC,,, | Performed by: FAMILY MEDICINE

## 2023-01-13 PROCEDURE — 99214 OFFICE O/P EST MOD 30 MIN: CPT | Mod: S$PBB,,, | Performed by: FAMILY MEDICINE

## 2023-01-13 RX ORDER — MUPIROCIN 20 MG/G
OINTMENT TOPICAL 3 TIMES DAILY
Qty: 15 G | Refills: 0 | Status: SHIPPED | OUTPATIENT
Start: 2023-01-13 | End: 2024-01-22

## 2023-01-13 NOTE — PROGRESS NOTES
(Portions of this note were dictated using voice recognition software and may contain dictation related errors in spelling/grammar/syntax not found on text review)    CC:   Chief Complaint   Patient presents with    Wound Check       HPI: 72 y.o. male, pt of Dr Shukla, presented for wound check as urgent care visit. He is new to me. He has medical history significant for hypertension, hyperlipidemia, obesity.  Patient reports that he was working in his yard 2 days ago and had superficial cut on right leg from the cactus shrubs, he noticed pain and area became red around small abrasion, he has been using neomycin cream and it minimizes the redness, he denies noticing any discharge or drainage, minimally painful to touch otherwise not bothersome. He denies fever or  chills. He has similar superficial abrasion on right arm as well. He denies having any other symptoms, denies cough, SOB,chest pain, N/V, and pain, changes in bowel habits, urine problems.     Past Medical History:   Diagnosis Date    Cataract     Fever blister     HSV-1 (herpes simplex virus 1) infection     Hyperlipidemia     Hypertension     Keloid cicatrix     Low serum testosterone level     Obese     Peyronie's disease        Past Surgical History:   Procedure Laterality Date    COLONOSCOPY N/A 8/5/2016    Procedure: COLONOSCOPY;  Surgeon: Denis Herzog MD;  Location: [facility] ([address] [place] [address]);  Service: Endoscopy;  Laterality: N/A;  has hemorrhoid that may need to be banded    COLONOSCOPY N/A 7/29/2021    Procedure: COLONOSCOPY;  Surgeon: Deejay Corley MD;  Location: [address]);  Service: Endoscopy;  Laterality: N/A;  Pt. is fully vaccinated.EC    HERNIA REPAIR      LIPOMA RESECTION N/A 11/30/2022    Procedure: EXCISION, LIPOMA Back;  Surgeon: Clayton Madrid MD;  Location: [address];  Service: General;  Laterality: N/A;    UMBILICAL HERNIA REPAIR         Family History   Problem Relation Age of Onset    Heart disease Mother      Heart disease Father     Hypertension Father     Heart disease Paternal Grandmother     Hypertension Paternal Grandmother     Colon cancer Neg Hx     Crohn's disease Neg Hx     Ulcerative colitis Neg Hx     Melanoma Neg Hx     Prostate cancer Neg Hx     Kidney disease Neg Hx        Social History     Tobacco Use    Smoking status: Former     Types: Cigars    Smokeless tobacco: Former   Substance Use Topics    Alcohol use: Yes     Alcohol/week: 4.2 standard drinks     Types: 5 Standard drinks or equivalent per week     Comment: 4-5 beers a day    Drug use: No       Lab Results   Component Value Date    WBC 9.17 06/09/2022    HGB 14.7 06/09/2022    HCT 45.4 06/09/2022    MCV 93 06/09/2022     06/09/2022    CHOL 240 (H) 06/09/2022    TRIG 209 (H) 06/09/2022    HDL 41 06/09/2022    ALT 26 06/09/2022    AST 18 06/09/2022    BILITOT 0.2 06/09/2022    ALKPHOS 92 06/09/2022     06/09/2022    K 5.1 06/09/2022     06/09/2022    CREATININE 1.0 10/25/2022    ESTGFRAFRICA >60.0 06/09/2022    EGFRNONAA >60.0 06/09/2022    CALCIUM 9.2 06/09/2022    ALBUMIN 3.7 06/09/2022    BUN 19 06/09/2022    CO2 23 06/09/2022    TSH 3.210 06/09/2022    PSA 0.67 11/01/2019    PSADIAG 0.60 05/22/2014    INR 0.9 07/02/2009    HGBA1C 5.8 (H) 06/09/2022    LDLCALC 157.2 06/09/2022     06/09/2022             Vital signs reviewed  PE:   APPEARANCE: Well nourished, well developed, in no acute distress.    HEAD: Normocephalic, atraumatic.  EYES: EOMI.  Conjunctivae noninjected.  NECK: Supple with no cervical lymphadenopathy.    CHEST: Good inspiratory effort. Lungs clear to auscultation with no wheezes or crackles.  CARDIOVASCULAR: Normal S1, S2. No rubs, murmurs, or gallops.  ABDOMEN: Bowel sounds normal. Not distended. Soft. No tenderness or masses. No organomegaly.  EXTREMITIES: mild non pitting edema B/L, no cyanosis, or clubbing. 1 cm small abrasion on right lower leg, localized erythema noted, non TTP    Review of  Systems   Constitutional:  Negative for chills, fatigue and fever.   HENT: Negative.     Respiratory:  Negative for cough, shortness of breath and wheezing.    Cardiovascular:  Negative for chest pain, palpitations and leg swelling.   Gastrointestinal:  Negative for abdominal pain, change in bowel habit, constipation, diarrhea, nausea, vomiting, reflux and change in bowel habit.   Genitourinary: Negative.    Musculoskeletal: Negative.    Neurological: Negative.    Psychiatric/Behavioral: Negative.     All other systems reviewed and are negative.    IMPRESSION  1. Superficial abrasion    2. Visit for wound check            PLAN      1. Superficial abrasion   Seem very superficial and localized, advised to use topical antibiotics    Advised to monitor for progression of redness, swelling, fever or chills    - mupirocin (BACTROBAN) 2 % ointment; Apply topically 3 (three) times daily.  Dispense: 15 g; Refill: 0    Discussed need for oral antibiotics if notice changes, he is agreeable to plan and will notify      2. Visit for wound check        - mupirocin (BACTROBAN) 2 % ointment; Apply topically 3 (three) times daily.  Dispense: 15 g; Refill: 0      Return ED/UC precautions discussed        Age/demographic appropriate health maintenance:    Health Maintenance Due   Topic Date Due    Shingles Vaccine (2 of 3) 05/22/2018    COVID-19 Vaccine (5 - Booster for Pfizer series) 05/31/2022    Influenza Vaccine (1) 09/01/2022           Tanvi Conrteras   1/13/2023

## 2023-03-28 ENCOUNTER — TELEPHONE (OUTPATIENT)
Dept: FAMILY MEDICINE | Facility: CLINIC | Age: 73
End: 2023-03-28
Payer: MEDICARE

## 2023-03-28 NOTE — TELEPHONE ENCOUNTER
Called patient to reschedule appt 4/18 due to PCP not being in office. Patient rescheduled for 5/18. Patient verbalized understanding.

## 2023-04-11 ENCOUNTER — PATIENT MESSAGE (OUTPATIENT)
Dept: DERMATOLOGY | Facility: CLINIC | Age: 73
End: 2023-04-11
Payer: MEDICARE

## 2023-04-11 ENCOUNTER — OFFICE VISIT (OUTPATIENT)
Dept: INTERNAL MEDICINE | Facility: CLINIC | Age: 73
End: 2023-04-11
Payer: MEDICARE

## 2023-04-11 VITALS
BODY MASS INDEX: 31.69 KG/M2 | DIASTOLIC BLOOD PRESSURE: 70 MMHG | HEART RATE: 91 BPM | OXYGEN SATURATION: 96 % | HEIGHT: 72 IN | SYSTOLIC BLOOD PRESSURE: 142 MMHG | WEIGHT: 233.94 LBS

## 2023-04-11 DIAGNOSIS — L72.0 EIC (EPIDERMAL INCLUSION CYST): Primary | ICD-10-CM

## 2023-04-11 PROCEDURE — 99214 PR OFFICE/OUTPT VISIT, EST, LEVL IV, 30-39 MIN: ICD-10-PCS | Mod: 25,S$PBB,, | Performed by: INTERNAL MEDICINE

## 2023-04-11 PROCEDURE — 10060 INCISION & DRAINAGE: ICD-10-PCS | Mod: S$PBB,,, | Performed by: INTERNAL MEDICINE

## 2023-04-11 PROCEDURE — 99999 PR PBB SHADOW E&M-EST. PATIENT-LVL IV: CPT | Mod: PBBFAC,,, | Performed by: INTERNAL MEDICINE

## 2023-04-11 PROCEDURE — 10060 I&D ABSCESS SIMPLE/SINGLE: CPT | Mod: PBBFAC | Performed by: INTERNAL MEDICINE

## 2023-04-11 PROCEDURE — 99999 PR PBB SHADOW E&M-EST. PATIENT-LVL IV: ICD-10-PCS | Mod: PBBFAC,,, | Performed by: INTERNAL MEDICINE

## 2023-04-11 PROCEDURE — 99214 OFFICE O/P EST MOD 30 MIN: CPT | Mod: 25,S$PBB,, | Performed by: INTERNAL MEDICINE

## 2023-04-11 PROCEDURE — 99214 OFFICE O/P EST MOD 30 MIN: CPT | Mod: PBBFAC,25 | Performed by: INTERNAL MEDICINE

## 2023-04-11 RX ORDER — CLINDAMYCIN HYDROCHLORIDE 300 MG/1
300 CAPSULE ORAL EVERY 8 HOURS
Qty: 30 CAPSULE | Refills: 0 | Status: SHIPPED | OUTPATIENT
Start: 2023-04-11 | End: 2023-08-06 | Stop reason: CLARIF

## 2023-04-11 NOTE — PATIENT INSTRUCTIONS
Leave 2 X 2 dressing on area for 24 hours.  Take clindamycin 300 mg by mouth 3x/day for 10 days.  Go to ED if fevers, increased pain or significant drainage.

## 2023-04-11 NOTE — PROGRESS NOTES
Subjective:       Patient ID: Riky Salcedo is a 73 y.o. male.    Chief Complaint: Cyst      Riky Salcedo is 73 y.o. male who presents for painful cysts on his back X few days.  Pt has had cyst removed 2x before but it re-occurs.      Review of Systems   Constitutional:  Negative for chills and fever.   Respiratory:  Negative for shortness of breath.    Gastrointestinal:  Negative for abdominal pain, constipation, diarrhea, nausea and vomiting.       Objective:      Physical Exam  Vitals reviewed.   Constitutional:       General: He is awake.      Appearance: Normal appearance.   HENT:      Head: Normocephalic and atraumatic.      Mouth/Throat:      Mouth: Mucous membranes are moist.      Pharynx: Oropharynx is clear.   Eyes:      Extraocular Movements: Extraocular movements intact.      Conjunctiva/sclera: Conjunctivae normal.      Pupils: Pupils are equal, round, and reactive to light.   Cardiovascular:      Rate and Rhythm: Normal rate and regular rhythm.      Heart sounds: No murmur heard.    No friction rub. No gallop.   Pulmonary:      Effort: Pulmonary effort is normal.      Breath sounds: Normal breath sounds.   Abdominal:      General: Bowel sounds are normal. There is no distension.      Tenderness: There is no abdominal tenderness. There is no guarding or rebound.   Musculoskeletal:      Right lower leg: No edema.      Left lower leg: No edema.   Lymphadenopathy:      Cervical: No cervical adenopathy.   Skin:            Comments: 2 X 3 inch fluctuant nodule on left flank   Neurological:      Mental Status: He is alert and oriented to person, place, and time.   Psychiatric:         Mood and Affect: Mood normal.         Behavior: Behavior is cooperative.       Assessment:       1. EIC (epidermal inclusion cyst)        Plan:     Pt with large fluctuant nodule on left lateral back c/w infected EIC.  I&D of lesion was done without complications (see procedure note).  Pt tolerated procedure and given  post procedure instructions to leave 2X2 pressure dressing intact for 24 hours.  Pt can remove dressing and packing after 24 hours.  Wound can then be covered with large Band-Aid.  Pt given clindamycin 300 mg po TID X 10 days given size of wound.  Patient was advised to follow up if worsening pain, fever, increased discharge or bleeding.      Riky was seen today for cyst.    Diagnoses and all orders for this visit:    EIC (epidermal inclusion cyst)  -     clindamycin (CLEOCIN) 300 MG capsule; Take 1 capsule (300 mg total) by mouth every 8 (eight) hours.  -     Incision & Drainage

## 2023-04-12 ENCOUNTER — TELEPHONE (OUTPATIENT)
Dept: DERMATOLOGY | Facility: CLINIC | Age: 73
End: 2023-04-12
Payer: MEDICARE

## 2023-04-12 NOTE — PROCEDURES
"Incision & Drainage    Date/Time: 4/11/2023 5:20 PM  Performed by: Gayathri Rock MD  Authorized by: Gayathri Rock MD     Time out: Immediately prior to procedure a "time out" was called to verify the correct patient, procedure, equipment, support staff and site/side marked as required.    Consent Done?:  Yes (Verbal)    Type:  Cyst  Body area:  Trunk  Location details:  Back  Anesthesia:  Local infiltration  Local anesthetic: Lidocaine 1% with epinephrine  Scalpel size:  15  Incision type:  Single straight  Incision depth: subcutaneous    Complexity:  Simple  Drainage:  Purulent  Drainage amount:  Copious  Wound treatment:  Incision, drainage, expression of material, deloculation, wound left open and wound packed  Packing material:  1/4 in gauze  Patient tolerance:  Patient tolerated the procedure well with no immediate complications  Pain Assessment: 2    2X2 pressure dressing placed over wound and patient instructed to leave dressing in place X 24 hours.  Pt instructed that gauze can be removed tomorrow and he could shower.  Pt to go to ED if increased pain, bleeding, discharge or fever.    "

## 2023-04-12 NOTE — TELEPHONE ENCOUNTER
"Spoke with pt. States he no longer needs an appt at this time. Pt states he went to urgent care and is "all set for now"        ----- Message from Merissa Foote LPN sent at 4/10/2023  1:15 PM CDT -----  Regarding: FW: appt soon  Contact: PT @ [phone]    ----- Message -----  From: Liborio Eduardo  Sent: 4/10/2023   9:31 AM CDT  To: [facility] Staff  Subject: appt soon                                        Pt called in to schedule an appt; no available appts in Epic. Pt is asking for a call back soon to schedule. Thanks.     Reason appt not schedule: none available. Pt is willing to see any provider that is available at any location.    Patient's DX: last seen on 7/2022..cyst on back very red and sore    Patient requesting call back or [facility] msg: call back. Thanks.       "

## 2023-04-16 ENCOUNTER — PATIENT MESSAGE (OUTPATIENT)
Dept: FAMILY MEDICINE | Facility: CLINIC | Age: 73
End: 2023-04-16
Payer: MEDICARE

## 2023-04-19 ENCOUNTER — TELEPHONE (OUTPATIENT)
Dept: FAMILY MEDICINE | Facility: CLINIC | Age: 73
End: 2023-04-19
Payer: MEDICARE

## 2023-04-19 DIAGNOSIS — R73.03 PREDIABETES: ICD-10-CM

## 2023-04-19 DIAGNOSIS — I10 ESSENTIAL HYPERTENSION: Primary | ICD-10-CM

## 2023-04-19 NOTE — TELEPHONE ENCOUNTER
Left voice message for pt to call back to see when is a date he is available to schedule on labs.

## 2023-04-20 NOTE — TELEPHONE ENCOUNTER
Spoke to pt and he said overall the wound is better, drainage is minimal.  His wife is cleansing it w/ H2O2 and covering w/ gauze and Bactroban ointment.  It is itching.  He is asking if he can have anything for that ?    ----- Message from Jane Lipscomb sent at 4/20/2023  3:02 PM CDT -----  Contact: Self/[phone]  [email or web address]     Patient is calling for Medical Advice regarding:Rash on back     How long has patient had these symptoms: 1 day     Pharmacy name and phone#:[facility] [address] [facility] [address]  [address] [facility] [address]  Phone: [phone] Fax: [phone]        Would like response via [employer]:  No     Comments:pt stated that he started taking a medication after a cyst was cut off of his back pt stated that he has developed a rash and wants to know what to do

## 2023-05-01 ENCOUNTER — OFFICE VISIT (OUTPATIENT)
Dept: OTOLARYNGOLOGY | Facility: CLINIC | Age: 73
End: 2023-05-01
Payer: MEDICARE

## 2023-05-01 VITALS
DIASTOLIC BLOOD PRESSURE: 77 MMHG | BODY MASS INDEX: 31.6 KG/M2 | WEIGHT: 233 LBS | SYSTOLIC BLOOD PRESSURE: 137 MMHG | HEART RATE: 82 BPM

## 2023-05-01 DIAGNOSIS — R22.0 LIP SWELLING: Primary | ICD-10-CM

## 2023-05-01 PROCEDURE — 99214 OFFICE O/P EST MOD 30 MIN: CPT | Mod: S$PBB,,, | Performed by: OTOLARYNGOLOGY

## 2023-05-01 PROCEDURE — 99999 PR PBB SHADOW E&M-EST. PATIENT-LVL II: ICD-10-PCS | Mod: PBBFAC,,, | Performed by: OTOLARYNGOLOGY

## 2023-05-01 PROCEDURE — 99214 PR OFFICE/OUTPT VISIT, EST, LEVL IV, 30-39 MIN: ICD-10-PCS | Mod: S$PBB,,, | Performed by: OTOLARYNGOLOGY

## 2023-05-01 PROCEDURE — 99999 PR PBB SHADOW E&M-EST. PATIENT-LVL II: CPT | Mod: PBBFAC,,, | Performed by: OTOLARYNGOLOGY

## 2023-05-01 PROCEDURE — 99212 OFFICE O/P EST SF 10 MIN: CPT | Mod: PBBFAC | Performed by: OTOLARYNGOLOGY

## 2023-05-03 NOTE — PROGRESS NOTES
Chief Complaint   Patient presents with    feels like lump inside mouth         73 y.o. male presents for evaluation of right lower lip swelling that comes and goes. Feels some lumps in his lip. No mucosal lesion. No other neck masses or swelling. No current swelling today. He has a history of HSV of the lips and feels that the swelling occurs when he has a flareup.      Review of Systems     Constitutional: Negative for fatigue and unexpected weight change.   HENT: per HPI.  Eyes: Negative for visual disturbance.   Respiratory: Negative for shortness of breath, hemoptysis  Cardiovascular: Negative for chest pain and palpitations.   Genitourinary: Negative for dysuria and difficulty urinating.   Musculoskeletal: Negative for decreased ROM, back pain.   Skin: Negative for rash, sunburn, itching.   Neurological: Negative for dizziness and seizures.   Hematological: Negative for adenopathy. Does not bruise/bleed easily.   Psychiatric/Behavioral: Negative for agitation. The patient is not nervous/anxious.   Endocrine: Negative for rapid weight loss/weight gain, heat/cold intolerance.     Past Medical History:   Diagnosis Date    Cataract     Fever blister     HSV-1 (herpes simplex virus 1) infection     Hyperlipidemia     Hypertension     Keloid cicatrix     Low serum testosterone level     Obese     Peyronie's disease        Past Surgical History:   Procedure Laterality Date    COLONOSCOPY N/A 8/5/2016    Procedure: COLONOSCOPY;  Surgeon: Denis Herzog MD;  Location: [address]);  Service: Endoscopy;  Laterality: N/A;  has hemorrhoid that may need to be banded    COLONOSCOPY N/A 7/29/2021    Procedure: COLONOSCOPY;  Surgeon: Deejay Corley MD;  Location: [address]);  Service: Endoscopy;  Laterality: N/A;  Pt. is fully vaccinated.EC    HERNIA REPAIR      LIPOMA RESECTION N/A 11/30/2022    Procedure: EXCISION, LIPOMA Back;  Surgeon: Clayton Madrid MD;  Location: [address];  Service:  General;  Laterality: N/A;    UMBILICAL HERNIA REPAIR         family history includes Heart disease in his father, mother, and paternal grandmother; Hypertension in his father and paternal grandmother.    Pt  reports that he has quit smoking. His smoking use included cigars. He has quit using smokeless tobacco. He reports current alcohol use of about 4.2 standard drinks per week. He reports that he does not use drugs.    Review of patient's allergies indicates:   Allergen Reactions    Clindamycin Itching        Physical Exam    Vitals:    05/01/23 1038   BP: 137/77   Pulse: 82     Body mass index is 31.6 kg/m².  Physical Exam  Vitals and nursing note reviewed.   Constitutional:       General: He is not in acute distress.     Appearance: He is well-developed. He is not diaphoretic.   HENT:      Head: Normocephalic and atraumatic.      Right Ear: Hearing and external ear normal. No decreased hearing noted.      Left Ear: Hearing and external ear normal. No decreased hearing noted.      Nose: Nose normal.      Mouth/Throat:      Mouth: Mucous membranes are moist. No oral lesions.      Tongue: No lesions.      Palate: No mass.      Pharynx: Oropharynx is clear. Uvula midline.      Comments: No mass or swelling of lower lip. Normal minor salivary glands palpated.   Eyes:      General: Lids are normal.         Right eye: No discharge.         Left eye: No discharge.      Conjunctiva/sclera: Conjunctivae normal.      Pupils: Pupils are equal, round, and reactive to light.   Neck:      Thyroid: No thyroid mass or thyromegaly.      Trachea: Trachea and phonation normal. No tracheal tenderness or tracheal deviation.   Cardiovascular:      Heart sounds: Normal heart sounds.   Pulmonary:      Breath sounds: Normal breath sounds. No stridor.   Abdominal:      Palpations: Abdomen is soft.   Musculoskeletal:      Cervical back: Normal range of motion and neck supple. No edema or erythema.   Lymphadenopathy:      Cervical: No  cervical adenopathy.   Skin:     General: Skin is warm and dry.      Coloration: Skin is not pale.      Findings: No erythema or rash.   Neurological:      Mental Status: He is alert and oriented to person, place, and time.          Assessment     1. Lip swelling          Plan  In summary, Mr. Salcedo is a 73 year old male with concern for right lower lip occasional swelling and lumps. Discussed how the lumps he feels are his minor salivary glands and are normal. No noted swelling or mass today on exam. Continue to monitor. All questions were answered. Return to clinic if new concerns occur.

## 2023-05-16 ENCOUNTER — LAB VISIT (OUTPATIENT)
Dept: LAB | Facility: HOSPITAL | Age: 73
End: 2023-05-16
Attending: FAMILY MEDICINE
Payer: MEDICARE

## 2023-05-16 DIAGNOSIS — R73.03 PREDIABETES: ICD-10-CM

## 2023-05-16 DIAGNOSIS — I10 ESSENTIAL HYPERTENSION: ICD-10-CM

## 2023-05-16 LAB
ALBUMIN SERPL BCP-MCNC: 3.6 G/DL (ref 3.5–5.2)
ALP SERPL-CCNC: 77 U/L (ref 55–135)
ALT SERPL W/O P-5'-P-CCNC: 26 U/L (ref 10–44)
ANION GAP SERPL CALC-SCNC: 5 MMOL/L (ref 8–16)
AST SERPL-CCNC: 20 U/L (ref 10–40)
BASOPHILS # BLD AUTO: 0.08 K/UL (ref 0–0.2)
BASOPHILS NFR BLD: 1 % (ref 0–1.9)
BILIRUB SERPL-MCNC: 0.3 MG/DL (ref 0.1–1)
BUN SERPL-MCNC: 23 MG/DL (ref 8–23)
CALCIUM SERPL-MCNC: 8.5 MG/DL (ref 8.7–10.5)
CHLORIDE SERPL-SCNC: 106 MMOL/L (ref 95–110)
CHOLEST SERPL-MCNC: 234 MG/DL (ref 120–199)
CHOLEST/HDLC SERPL: 5.4 {RATIO} (ref 2–5)
CO2 SERPL-SCNC: 27 MMOL/L (ref 23–29)
CREAT SERPL-MCNC: 0.9 MG/DL (ref 0.5–1.4)
DIFFERENTIAL METHOD: ABNORMAL
EOSINOPHIL # BLD AUTO: 0.2 K/UL (ref 0–0.5)
EOSINOPHIL NFR BLD: 3 % (ref 0–8)
ERYTHROCYTE [DISTWIDTH] IN BLOOD BY AUTOMATED COUNT: 13.8 % (ref 11.5–14.5)
EST. GFR  (NO RACE VARIABLE): >60 ML/MIN/1.73 M^2
ESTIMATED AVG GLUCOSE: 117 MG/DL (ref 68–131)
GLUCOSE SERPL-MCNC: 100 MG/DL (ref 70–110)
HBA1C MFR BLD: 5.7 % (ref 4–5.6)
HCT VFR BLD AUTO: 44.9 % (ref 40–54)
HDLC SERPL-MCNC: 43 MG/DL (ref 40–75)
HDLC SERPL: 18.4 % (ref 20–50)
HGB BLD-MCNC: 14.3 G/DL (ref 14–18)
IMM GRANULOCYTES # BLD AUTO: 0.03 K/UL (ref 0–0.04)
IMM GRANULOCYTES NFR BLD AUTO: 0.4 % (ref 0–0.5)
LDLC SERPL CALC-MCNC: 162.4 MG/DL (ref 63–159)
LYMPHOCYTES # BLD AUTO: 2.7 K/UL (ref 1–4.8)
LYMPHOCYTES NFR BLD: 33.7 % (ref 18–48)
MCH RBC QN AUTO: 30.1 PG (ref 27–31)
MCHC RBC AUTO-ENTMCNC: 31.8 G/DL (ref 32–36)
MCV RBC AUTO: 95 FL (ref 82–98)
MONOCYTES # BLD AUTO: 0.7 K/UL (ref 0.3–1)
MONOCYTES NFR BLD: 8.3 % (ref 4–15)
NEUTROPHILS # BLD AUTO: 4.3 K/UL (ref 1.8–7.7)
NEUTROPHILS NFR BLD: 53.6 % (ref 38–73)
NONHDLC SERPL-MCNC: 191 MG/DL
NRBC BLD-RTO: 0 /100 WBC
PLATELET # BLD AUTO: 217 K/UL (ref 150–450)
PMV BLD AUTO: 11.6 FL (ref 9.2–12.9)
POTASSIUM SERPL-SCNC: 4.9 MMOL/L (ref 3.5–5.1)
PROT SERPL-MCNC: 6.6 G/DL (ref 6–8.4)
RBC # BLD AUTO: 4.75 M/UL (ref 4.6–6.2)
SODIUM SERPL-SCNC: 138 MMOL/L (ref 136–145)
TRIGL SERPL-MCNC: 143 MG/DL (ref 30–150)
TSH SERPL DL<=0.005 MIU/L-ACNC: 3.2 UIU/ML (ref 0.4–4)
WBC # BLD AUTO: 8.04 K/UL (ref 3.9–12.7)

## 2023-05-16 PROCEDURE — 85025 COMPLETE CBC W/AUTO DIFF WBC: CPT | Performed by: FAMILY MEDICINE

## 2023-05-16 PROCEDURE — 83036 HEMOGLOBIN GLYCOSYLATED A1C: CPT | Performed by: FAMILY MEDICINE

## 2023-05-16 PROCEDURE — 36415 COLL VENOUS BLD VENIPUNCTURE: CPT | Mod: PO | Performed by: FAMILY MEDICINE

## 2023-05-16 PROCEDURE — 80053 COMPREHEN METABOLIC PANEL: CPT | Performed by: FAMILY MEDICINE

## 2023-05-16 PROCEDURE — 84443 ASSAY THYROID STIM HORMONE: CPT | Performed by: FAMILY MEDICINE

## 2023-05-16 PROCEDURE — 80061 LIPID PANEL: CPT | Performed by: FAMILY MEDICINE

## 2023-05-18 ENCOUNTER — OFFICE VISIT (OUTPATIENT)
Dept: FAMILY MEDICINE | Facility: CLINIC | Age: 73
End: 2023-05-18
Payer: MEDICARE

## 2023-05-18 VITALS
BODY MASS INDEX: 31.99 KG/M2 | WEIGHT: 235.88 LBS | HEART RATE: 83 BPM | OXYGEN SATURATION: 95 % | DIASTOLIC BLOOD PRESSURE: 72 MMHG | SYSTOLIC BLOOD PRESSURE: 114 MMHG

## 2023-05-18 DIAGNOSIS — J84.10 LUNG GRANULOMA: ICD-10-CM

## 2023-05-18 DIAGNOSIS — F10.19 ALCOHOL ABUSE WITH ALCOHOL-INDUCED DISORDER: ICD-10-CM

## 2023-05-18 DIAGNOSIS — R73.03 PREDIABETES: ICD-10-CM

## 2023-05-18 DIAGNOSIS — I10 PRIMARY HYPERTENSION: Primary | ICD-10-CM

## 2023-05-18 DIAGNOSIS — E78.5 HYPERLIPIDEMIA, UNSPECIFIED HYPERLIPIDEMIA TYPE: ICD-10-CM

## 2023-05-18 DIAGNOSIS — E66.9 CLASS 1 OBESITY WITH BODY MASS INDEX (BMI) OF 31.0 TO 31.9 IN ADULT, UNSPECIFIED OBESITY TYPE, UNSPECIFIED WHETHER SERIOUS COMORBIDITY PRESENT: ICD-10-CM

## 2023-05-18 PROCEDURE — 99215 PR OFFICE/OUTPT VISIT, EST, LEVL V, 40-54 MIN: ICD-10-PCS | Mod: S$PBB,,, | Performed by: FAMILY MEDICINE

## 2023-05-18 PROCEDURE — 99215 OFFICE O/P EST HI 40 MIN: CPT | Mod: S$PBB,,, | Performed by: FAMILY MEDICINE

## 2023-05-18 PROCEDURE — 99213 OFFICE O/P EST LOW 20 MIN: CPT | Mod: PBBFAC,PO | Performed by: FAMILY MEDICINE

## 2023-05-18 PROCEDURE — 99999 PR PBB SHADOW E&M-EST. PATIENT-LVL III: CPT | Mod: PBBFAC,,, | Performed by: FAMILY MEDICINE

## 2023-05-18 PROCEDURE — 99999 PR PBB SHADOW E&M-EST. PATIENT-LVL III: ICD-10-PCS | Mod: PBBFAC,,, | Performed by: FAMILY MEDICINE

## 2023-05-18 RX ORDER — ATORVASTATIN CALCIUM 10 MG/1
10 TABLET, FILM COATED ORAL DAILY
Qty: 90 TABLET | Refills: 3 | Status: SHIPPED | OUTPATIENT
Start: 2023-05-18 | End: 2023-11-20

## 2023-05-18 NOTE — PROGRESS NOTES
Subjective     Patient ID: Riky Salcedo is a 73 y.o. male.    Chief Complaint: Hypertension    73 years old male came to clinic for blood pressure check.  Blood pressure today was stable.  Patient with elevated cholesterol.  He prefers to do lifestyle changes to help with the cholesterol.  No chest pain, palpitation, orthopnea or PND.  Patient is still drinking beer.  He has a BMI of 31 currently trying to lose weight.  He was previously diagnosed with lung granuloma.    Hypertension  Pertinent negatives include no chest pain or palpitations.   Review of Systems   Constitutional: Negative.    HENT: Negative.     Eyes: Negative.    Respiratory: Negative.     Cardiovascular: Negative.  Negative for chest pain, palpitations, leg swelling and claudication.   Gastrointestinal: Negative.    Genitourinary: Negative.    Musculoskeletal: Negative.    Integumentary:  Negative.   Neurological: Negative.    Psychiatric/Behavioral: Negative.          Objective     Physical Exam  Vitals and nursing note reviewed.   Constitutional:       General: He is not in acute distress.     Appearance: He is well-developed. He is not diaphoretic.   HENT:      Head: Normocephalic and atraumatic.      Right Ear: External ear normal.      Left Ear: External ear normal.      Nose: Nose normal.      Mouth/Throat:      Pharynx: No oropharyngeal exudate.   Eyes:      General: No scleral icterus.        Right eye: No discharge.         Left eye: No discharge.      Conjunctiva/sclera: Conjunctivae normal.      Pupils: Pupils are equal, round, and reactive to light.   Neck:      Thyroid: No thyromegaly.      Vascular: No JVD.      Trachea: No tracheal deviation.   Cardiovascular:      Rate and Rhythm: Normal rate and regular rhythm.      Heart sounds: Normal heart sounds. No murmur heard.    No friction rub. No gallop.   Pulmonary:      Effort: Pulmonary effort is normal. No respiratory distress.      Breath sounds: Normal breath sounds. No  stridor. No wheezing or rales.   Chest:      Chest wall: No tenderness.   Abdominal:      General: Bowel sounds are normal. There is no distension.      Palpations: Abdomen is soft. There is no mass.      Tenderness: There is no abdominal tenderness. There is no guarding or rebound.   Musculoskeletal:         General: No tenderness. Normal range of motion.      Cervical back: Normal range of motion and neck supple.   Lymphadenopathy:      Cervical: No cervical adenopathy.   Skin:     General: Skin is warm and dry.      Coloration: Skin is not pale.      Findings: No erythema or rash.   Neurological:      Mental Status: He is alert and oriented to person, place, and time.      Cranial Nerves: No cranial nerve deficit.      Motor: No abnormal muscle tone.      Coordination: Coordination normal.      Deep Tendon Reflexes: Reflexes are normal and symmetric. Reflexes normal.   Psychiatric:         Behavior: Behavior normal.         Thought Content: Thought content normal.         Judgment: Judgment normal.          Assessment and Plan     Problem List Items Addressed This Visit       Alcohol abuse with alcohol-induced disorder (Chronic)    Hypertension - Primary    Relevant Orders    CBC Auto Differential    Comprehensive Metabolic Panel    Lipid Panel    Urinalysis    TSH    Lung granuloma     Other Visit Diagnoses       Hyperlipidemia, unspecified hyperlipidemia type        Relevant Medications    atorvastatin (LIPITOR) 10 MG tablet    Other Relevant Orders    Lipid Panel    Class 1 obesity with body mass index (BMI) of 31.0 to 31.9 in adult, unspecified obesity type, unspecified whether serious comorbidity present        Prediabetes        Relevant Orders    Hemoglobin A1C          Riky was seen today for hypertension.    Diagnoses and all orders for this visit:    Primary hypertension  -     CBC Auto Differential; Future  -     Comprehensive Metabolic Panel; Future  -     Lipid Panel; Future  -     Urinalysis;  Future  -     TSH; Future    Hyperlipidemia, unspecified hyperlipidemia type  -     atorvastatin (LIPITOR) 10 MG tablet; Take 1 tablet (10 mg total) by mouth once daily.  -     Lipid Panel; Future    Class 1 obesity with body mass index (BMI) of 31.0 to 31.9 in adult, unspecified obesity type, unspecified whether serious comorbidity present    Alcohol abuse with alcohol-induced disorder    Lung granuloma    Prediabetes  -     Hemoglobin A1C; Future     Continue monitoring blood pressure at home, low sodium diet.          Diet and physical activity to promote weight loss.   Trial with low dose statin.

## 2023-06-07 ENCOUNTER — OFFICE VISIT (OUTPATIENT)
Dept: CARDIOLOGY | Facility: CLINIC | Age: 73
End: 2023-06-07
Payer: MEDICARE

## 2023-06-07 VITALS
BODY MASS INDEX: 31.83 KG/M2 | WEIGHT: 235 LBS | HEIGHT: 72 IN | SYSTOLIC BLOOD PRESSURE: 130 MMHG | RESPIRATION RATE: 20 BRPM | DIASTOLIC BLOOD PRESSURE: 80 MMHG | HEART RATE: 90 BPM

## 2023-06-07 DIAGNOSIS — R07.89 CHEST DISCOMFORT: ICD-10-CM

## 2023-06-07 DIAGNOSIS — R42 DIZZY: Primary | ICD-10-CM

## 2023-06-07 DIAGNOSIS — E78.00 HYPERCHOLESTEREMIA: ICD-10-CM

## 2023-06-07 DIAGNOSIS — I10 PRIMARY HYPERTENSION: ICD-10-CM

## 2023-06-07 PROCEDURE — 99999 PR PBB SHADOW E&M-EST. PATIENT-LVL III: CPT | Mod: PBBFAC,,, | Performed by: INTERNAL MEDICINE

## 2023-06-07 PROCEDURE — 93010 ELECTROCARDIOGRAM REPORT: CPT | Mod: S$PBB,,, | Performed by: INTERNAL MEDICINE

## 2023-06-07 PROCEDURE — 99213 OFFICE O/P EST LOW 20 MIN: CPT | Mod: PBBFAC | Performed by: INTERNAL MEDICINE

## 2023-06-07 PROCEDURE — 99204 OFFICE O/P NEW MOD 45 MIN: CPT | Mod: S$PBB,25,, | Performed by: INTERNAL MEDICINE

## 2023-06-07 PROCEDURE — 99204 PR OFFICE/OUTPT VISIT, NEW, LEVL IV, 45-59 MIN: ICD-10-PCS | Mod: S$PBB,25,, | Performed by: INTERNAL MEDICINE

## 2023-06-07 PROCEDURE — 99999 PR PBB SHADOW E&M-EST. PATIENT-LVL III: ICD-10-PCS | Mod: PBBFAC,,, | Performed by: INTERNAL MEDICINE

## 2023-06-07 PROCEDURE — 93010 EKG 12-LEAD: ICD-10-PCS | Mod: S$PBB,,, | Performed by: INTERNAL MEDICINE

## 2023-06-07 PROCEDURE — 93005 ELECTROCARDIOGRAM TRACING: CPT | Mod: PBBFAC | Performed by: INTERNAL MEDICINE

## 2023-06-07 NOTE — PROGRESS NOTES
HISTORY:    72 yo M w hypertension, hyperlipidemia, and etoh abuse presenting for initial evaluation by me.    Woke up last night in cold sweats and had some chest discomfort. Symptoms self-resolved and have not recurred. Patient not sure if it is related to atorvastatin. Did drink 5 beers last night and ate [employer], which is on the low end for him.    Activity levels mild to moderate at baseline without limitation.    The patient denies any previous history of myocardial infarction, coronary artery disease, peripheral arterial disease, stroke, congestive heart failure, or cardiomyopathy.    He tolerates lisinopril 40 x 1 and atorvastatin 10 x 1 (started last week).    PHYSICAL EXAM:    Vitals:    06/07/23 1501   BP: 130/80   Pulse: 90   Resp: 20       NAD, A+Ox3.  No jvd, no bruit.  RRR nml s1,s2. No murmurs.  CTA B no wheezes or crackles.  No edema.    LABS/STUDIES (imaging reviewed during clinic visit):    May 2023 CBC and CMP normal.   and HDL 43.  Triglycerides 143.  A1c 5.7.    EKG today demonstrates sinus rhythm with no Q-waves or ST changes.  EVI 2016 no evidence of ischemia.  Coronary calcium score 2019 Agatston score 2. 2013 0.  WBI 2019 normal bilaterally.        ASSESSMENT & PLAN:    1. Dizzy    2. Hypercholesteremia    3. Primary hypertension    4. Chest discomfort        Orders Placed This Encounter    IN OFFICE EKG 12-LEAD (to Muse)    Stress Echo Which stress agent will be used? Treadmill Exercise; Color Flow Doppler? No        CP of unclear etiology. ECG unremarkable. Check EVI. Ca score 2 in '19.    Bps controlled on lisinopril.    Pt has been on atorvastatin 10x1 for a week. Okay to take a break from this med if he feels it is responsible for his nightmares. Can readdress in the future.    Follow up in about 3 months (around 9/7/2023).      Brian Agrawal MD

## 2023-08-02 ENCOUNTER — HOSPITAL ENCOUNTER (OUTPATIENT)
Dept: CARDIOLOGY | Facility: HOSPITAL | Age: 73
Discharge: HOME | End: 2023-08-02
Attending: INTERNAL MEDICINE
Payer: MEDICARE

## 2023-08-02 DIAGNOSIS — R07.89 CHEST DISCOMFORT: ICD-10-CM

## 2023-08-02 LAB
ASCENDING AORTA: 3.33 CM
CV ECHO LV RWT: 0.3 CM
CV STRESS BASE HR: 71 BPM
DIASTOLIC BLOOD PRESSURE: 82 MMHG
DOP CALC LVOT AREA: 3.9 CM2
DOP CALC LVOT DIAMETER: 2.22 CM
DOP CALC LVOT PEAK VEL: 0.89 M/S
DOP CALC LVOT STROKE VOLUME: 70.64 CM3
DOP CALCLVOT PEAK VEL VTI: 18.26 CM
E WAVE DECELERATION TIME: 212.35 MSEC
E/A RATIO: 1.06
E/E' RATIO: 7.3 M/S
ECHO LV POSTERIOR WALL: 0.82 CM (ref 0.6–1.1)
FRACTIONAL SHORTENING: 35 % (ref 28–44)
INTERVENTRICULAR SEPTUM: 0.97 CM (ref 0.6–1.1)
LA MAJOR: 4.22 CM
LA MINOR: 3.93 CM
LA WIDTH: 3.86 CM
LEFT ATRIUM SIZE: 3.98 CM
LEFT ATRIUM VOLUME MOD: 48.24 CM3
LEFT ATRIUM VOLUME: 53.15 CM3
LEFT INTERNAL DIMENSION IN SYSTOLE: 3.56 CM (ref 2.1–4)
LEFT VENTRICLE DIASTOLIC VOLUME: 145.86 ML
LEFT VENTRICLE SYSTOLIC VOLUME: 52.97 ML
LEFT VENTRICULAR INTERNAL DIMENSION IN DIASTOLE: 5.47 CM (ref 3.5–6)
LEFT VENTRICULAR MASS: 182.8 G
LV LATERAL E/E' RATIO: 6 M/S
LV SEPTAL E/E' RATIO: 9.33 M/S
MV A" WAVE DURATION": 12.84 MSEC
MV PEAK A VEL: 0.79 M/S
MV PEAK E VEL: 0.84 M/S
MV STENOSIS PRESSURE HALF TIME: 61.58 MS
MV VALVE AREA P 1/2 METHOD: 3.57 CM2
OHS CV CPX 1 MINUTE RECOVERY HEART RATE: 107 BPM
OHS CV CPX 85 PERCENT MAX PREDICTED HEART RATE MALE: 123
OHS CV CPX ESTIMATED METS: 7
OHS CV CPX MAX PREDICTED HEART RATE: 145
OHS CV CPX PATIENT IS FEMALE: 0
OHS CV CPX PATIENT IS MALE: 1
OHS CV CPX PEAK DIASTOLIC BLOOD PRESSURE: 63 MMHG
OHS CV CPX PEAK HEAR RATE: 133 BPM
OHS CV CPX PEAK RATE PRESSURE PRODUCT: NORMAL
OHS CV CPX PEAK SYSTOLIC BLOOD PRESSURE: 186 MMHG
OHS CV CPX PERCENT MAX PREDICTED HEART RATE ACHIEVED: 92
OHS CV CPX RATE PRESSURE PRODUCT PRESENTING: NORMAL
PULM VEIN S/D RATIO: 1.13
PV PEAK D VEL: 0.4 M/S
PV PEAK S VEL: 0.45 M/S
RA MAJOR: 3.86 CM
RA PRESSURE ESTIMATED: 3 MMHG
RA WIDTH: 2.69 CM
SINUS: 3.28 CM
STJ: 2.75 CM
STRESS ECHO POST EXERCISE DUR MIN: 5 MINUTES
STRESS ECHO POST EXERCISE DUR SEC: 26 SECONDS
SYSTOLIC BLOOD PRESSURE: 146 MMHG
TDI LATERAL: 0.14 M/S
TDI SEPTAL: 0.09 M/S
TDI: 0.12 M/S

## 2023-08-02 PROCEDURE — 93351 STRESS ECHO (CUPID ONLY): ICD-10-PCS | Mod: 26,,, | Performed by: INTERNAL MEDICINE

## 2023-08-02 PROCEDURE — 93351 STRESS TTE COMPLETE: CPT

## 2023-08-02 PROCEDURE — 93351 STRESS TTE COMPLETE: CPT | Mod: 26,,, | Performed by: INTERNAL MEDICINE

## 2023-08-06 ENCOUNTER — HOSPITAL ENCOUNTER (EMERGENCY)
Facility: HOSPITAL | Age: 73
Discharge: HOME | End: 2023-08-06
Attending: EMERGENCY MEDICINE
Payer: MEDICARE

## 2023-08-06 VITALS
TEMPERATURE: 98 F | DIASTOLIC BLOOD PRESSURE: 60 MMHG | BODY MASS INDEX: 31.83 KG/M2 | HEART RATE: 92 BPM | RESPIRATION RATE: 18 BRPM | SYSTOLIC BLOOD PRESSURE: 131 MMHG | OXYGEN SATURATION: 96 % | HEIGHT: 72 IN | WEIGHT: 235 LBS

## 2023-08-06 DIAGNOSIS — L03.90 CELLULITIS, UNSPECIFIED CELLULITIS SITE: Primary | ICD-10-CM

## 2023-08-06 PROCEDURE — 99283 EMERGENCY DEPT VISIT LOW MDM: CPT

## 2023-08-06 PROCEDURE — 25000003 PHARM REV CODE 250: Performed by: PHYSICIAN ASSISTANT

## 2023-08-06 RX ORDER — CEPHALEXIN 500 MG/1
500 CAPSULE ORAL
Status: COMPLETED | OUTPATIENT
Start: 2023-08-06 | End: 2023-08-06

## 2023-08-06 RX ORDER — CEPHALEXIN 500 MG/1
CAPSULE ORAL
Status: DISCONTINUED
Start: 2023-08-06 | End: 2023-08-06 | Stop reason: HOSPADM

## 2023-08-06 RX ORDER — CEPHALEXIN 500 MG/1
500 CAPSULE ORAL 4 TIMES DAILY
Qty: 20 CAPSULE | Refills: 0 | Status: SHIPPED | OUTPATIENT
Start: 2023-08-06 | End: 2023-08-11

## 2023-08-06 RX ADMIN — CEPHALEXIN 500 MG: 500 CAPSULE ORAL at 07:08

## 2023-08-07 NOTE — DISCHARGE INSTRUCTIONS
You were seen in the emergency department for a skin infection.  Please use warm compresses 3 times a day as this will help encouraged the lesion to drain.  I prescribed you antibiotics please take this until finished.  If you develop any new or worsening of her symptoms please return to ED sooner.

## 2023-08-07 NOTE — ED NOTES
Patient identifiers verified and correct for  Mr Mota  C/C: Right groin pain SEE NN  APPEARANCE: awake and alert in NAD. PAIN  3/10  SKIN: warm, dry and intact. No breakdown or bruising.  MUSCULOSKELETAL: Patient moving all extremities spontaneously, no obvious swelling or deformities noted. Ambulates independently.  RESPIRATORY: Denies shortness of breath.Respirations unlabored.   CARDIAC: Denies CP, 2+ distal pulses; no peripheral edema  ABDOMEN: S/ND/NT, Denies nausea  : voids spontaneously, denies difficult  Neurologic: AAO x 4; follows commands equal strength in all extremities; denies numbness/tingling. Denies dizziness

## 2023-08-07 NOTE — ED PROVIDER NOTES
Encounter Date: 8/6/2023       History     Chief Complaint   Patient presents with    Groin Pain     Red lump to r groin with swelling, no drainage     72-year-old male with past medical history of Peyronie's disease, HSV 1, hyperlipidemia presents to the emergency department for skin infection.  States yesterday noticed red indurated lesion in his right groin.  Denies any fevers/chills.    The history is provided by the patient.     Review of patient's allergies indicates:   Allergen Reactions    Clindamycin Itching     Past Medical History:   Diagnosis Date    Cataract     Fever blister     HSV-1 (herpes simplex virus 1) infection     Hyperlipidemia     Hypertension     Keloid cicatrix     Low serum testosterone level     Obese     Peyronie's disease      Past Surgical History:   Procedure Laterality Date    COLONOSCOPY N/A 8/5/2016    Procedure: COLONOSCOPY;  Surgeon: Denis Herzog MD;  Location: [facility] ([address] [place] [address]);  Service: Endoscopy;  Laterality: N/A;  has hemorrhoid that may need to be banded    COLONOSCOPY N/A 7/29/2021    Procedure: COLONOSCOPY;  Surgeon: Deejay Corley MD;  Location: [facility] ([address] [place] [address]);  Service: Endoscopy;  Laterality: N/A;  Pt. is fully vaccinated.EC    HERNIA REPAIR      LIPOMA RESECTION N/A 11/30/2022    Procedure: EXCISION, LIPOMA Back;  Surgeon: Clayton Madrid MD;  Location: [address];  Service: General;  Laterality: N/A;    UMBILICAL HERNIA REPAIR       Family History   Problem Relation Age of Onset    Heart disease Mother     Heart disease Father     Hypertension Father     Heart disease Paternal Grandmother     Hypertension Paternal Grandmother     Colon cancer Neg Hx     Crohn's disease Neg Hx     Ulcerative colitis Neg Hx     Melanoma Neg Hx     Prostate cancer Neg Hx     Kidney disease Neg Hx      Social History     Tobacco Use    Smoking status: Former     Current packs/day: 0.00     Types: Cigars    Smokeless tobacco: Former   Substance Use Topics     Alcohol use: Yes     Alcohol/week: 4.2 standard drinks of alcohol     Types: 5 Standard drinks or equivalent per week     Comment: 4-5 beers a day    Drug use: No     Review of Systems   Constitutional:  Negative for chills and fever.   HENT:  Negative for sore throat.    Respiratory:  Negative for cough and shortness of breath.    Cardiovascular:  Negative for chest pain.   Gastrointestinal:  Negative for abdominal pain.   Genitourinary:  Negative for difficulty urinating.   Musculoskeletal: Negative.    Skin:  Positive for color change.   Neurological:  Negative for weakness.   Psychiatric/Behavioral:  Negative for confusion.        Physical Exam     Initial Vitals [08/06/23 1832]   BP Pulse Resp Temp SpO2   131/60 92 18 97.9 °F (36.6 °C) 96 %      MAP       --         Physical Exam    Nursing note and vitals reviewed.  Constitutional: He appears well-developed and well-nourished.   HENT:   Head: Normocephalic and atraumatic.   Eyes: Pupils are equal, round, and reactive to light.   Neck: Neck supple.   Normal range of motion.  Cardiovascular:  Normal rate.           Pulmonary/Chest: Breath sounds normal.   Abdominal: Abdomen is soft. There is no abdominal tenderness.   Musculoskeletal:         General: Normal range of motion.      Cervical back: Normal range of motion and neck supple.     Neurological: He is alert and oriented to person, place, and time. GCS score is 15. GCS eye subscore is 4. GCS verbal subscore is 5. GCS motor subscore is 6.   Skin: Skin is warm and dry. Capillary refill takes less than 2 seconds.   Right groin just below the waistline there is erythema about 3 x 1 cm.  Is slight amount of induration but no fluctuant area.   Psychiatric: He has a normal mood and affect.         ED Course   Procedures  Labs Reviewed - No data to display       Imaging Results    None          Medications   cephALEXin capsule 500 mg (has no administration in time range)     Medical Decision Making:   History:    Old Medical Records: I decided to obtain old medical records.  Initial Assessment:   73-year-old male presents to the ED for skin evaluation.  His erythematous lesion to the right groin.  Differential Diagnosis:   Hernia, abscess, folliculitis, cellulitis  ED Management:  On exam there is a erythematous lesion just below his waistline on the right side.  It is indurated with no fluctuance or drainable area.  Will advise warm compresses and will start on Keflex.  Discussed return ED precautions for any new or worsening symptoms.                          Clinical Impression:   Final diagnoses:  [L03.90] Cellulitis, unspecified cellulitis site (Primary)        ED Disposition Condition    Discharge Stable          ED Prescriptions       Medication Sig Dispense Start Date End Date Auth. Provider    cephALEXin (KEFLEX) 500 MG capsule Take 1 capsule (500 mg total) by mouth 4 (four) times daily. for 5 days 20 capsule 8/6/2023 8/11/2023 Tiffany Rivera PA-C          Follow-up Information       Follow up With Specialties Details Why Contact Info    Taj De La Vega MD Family Medicine Schedule an appointment as soon as possible for a visit  As needed [address] [facility] [place] [address]  [phone]               Tiffany Rivera PA-C  08/06/23 1911

## 2023-09-11 ENCOUNTER — TELEPHONE (OUTPATIENT)
Dept: DERMATOLOGY | Facility: CLINIC | Age: 73
End: 2023-09-11
Payer: MEDICARE

## 2023-09-14 ENCOUNTER — OFFICE VISIT (OUTPATIENT)
Dept: SPORTS MEDICINE | Facility: CLINIC | Age: 73
End: 2023-09-14
Payer: MEDICARE

## 2023-09-14 ENCOUNTER — HOSPITAL ENCOUNTER (OUTPATIENT)
Dept: RADIOLOGY | Facility: HOSPITAL | Age: 73
Discharge: HOME | End: 2023-09-14
Attending: ORTHOPAEDIC SURGERY
Payer: MEDICARE

## 2023-09-14 VITALS
SYSTOLIC BLOOD PRESSURE: 111 MMHG | WEIGHT: 235 LBS | DIASTOLIC BLOOD PRESSURE: 73 MMHG | HEIGHT: 72 IN | BODY MASS INDEX: 31.83 KG/M2 | HEART RATE: 91 BPM

## 2023-09-14 DIAGNOSIS — M25.512 LEFT SHOULDER PAIN, UNSPECIFIED CHRONICITY: ICD-10-CM

## 2023-09-14 DIAGNOSIS — S46.012A TRAUMATIC INCOMPLETE TEAR OF LEFT ROTATOR CUFF, INITIAL ENCOUNTER: ICD-10-CM

## 2023-09-14 DIAGNOSIS — M25.512 LEFT SHOULDER PAIN, UNSPECIFIED CHRONICITY: Primary | ICD-10-CM

## 2023-09-14 PROCEDURE — 73030 X-RAY EXAM OF SHOULDER: CPT | Mod: TC,LT

## 2023-09-14 PROCEDURE — 73030 X-RAY EXAM OF SHOULDER: CPT | Mod: 26,LT,, | Performed by: RADIOLOGY

## 2023-09-14 PROCEDURE — 73030 XR SHOULDER COMPLETE 2 OR MORE VIEWS LEFT: ICD-10-PCS | Mod: 26,LT,, | Performed by: RADIOLOGY

## 2023-09-14 PROCEDURE — 99999 PR PBB SHADOW E&M-EST. PATIENT-LVL III: ICD-10-PCS | Mod: PBBFAC,,, | Performed by: ORTHOPAEDIC SURGERY

## 2023-09-14 PROCEDURE — 99999 PR PBB SHADOW E&M-EST. PATIENT-LVL III: CPT | Mod: PBBFAC,,, | Performed by: ORTHOPAEDIC SURGERY

## 2023-09-14 PROCEDURE — 99213 OFFICE O/P EST LOW 20 MIN: CPT | Mod: PBBFAC | Performed by: ORTHOPAEDIC SURGERY

## 2023-09-14 PROCEDURE — 99203 PR OFFICE/OUTPT VISIT, NEW, LEVL III, 30-44 MIN: ICD-10-PCS | Mod: S$PBB,,, | Performed by: ORTHOPAEDIC SURGERY

## 2023-09-14 PROCEDURE — 99203 OFFICE O/P NEW LOW 30 MIN: CPT | Mod: S$PBB,,, | Performed by: ORTHOPAEDIC SURGERY

## 2023-09-14 NOTE — PROGRESS NOTES
CC: LEFT shoulder pain     73 y.o. Male with a 10 month history of left shoulder atraumatic pain.  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    He states that the pain is severe and not responding to any conservative care.      He reports that the pain and weakness is worse with overhead activity. It also bothers him at night.    Is affecting ADLs.  Pain is 4/10 at it's worst.      Past Medical History:   Diagnosis Date    Cataract     Fever blister     HSV-1 (herpes simplex virus 1) infection     Hyperlipidemia     Hypertension     Keloid cicatrix     Low serum testosterone level     Obese     Peyronie's disease        Past Surgical History:   Procedure Laterality Date    COLONOSCOPY N/A 8/5/2016    Procedure: COLONOSCOPY;  Surgeon: Denis Herzog MD;  Location: [facility] ([address] [place] [address]);  Service: Endoscopy;  Laterality: N/A;  has hemorrhoid that may need to be banded    COLONOSCOPY N/A 7/29/2021    Procedure: COLONOSCOPY;  Surgeon: Deejay Corley MD;  Location: [facility] ([address] [place] [address]);  Service: Endoscopy;  Laterality: N/A;  Pt. is fully vaccinated.EC    HERNIA REPAIR      LIPOMA RESECTION N/A 11/30/2022    Procedure: EXCISION, LIPOMA Back;  Surgeon: Clayton Madrid MD;  Location: [address];  Service: General;  Laterality: N/A;    UMBILICAL HERNIA REPAIR         Family History   Problem Relation Age of Onset    Heart disease Mother     Heart disease Father     Hypertension Father     Heart disease Paternal Grandmother     Hypertension Paternal Grandmother     Colon cancer Neg Hx     Crohn's disease Neg Hx     Ulcerative colitis Neg Hx     Melanoma Neg Hx     Prostate cancer Neg Hx     Kidney disease Neg Hx          Current Outpatient Medications:     acetaminophen (TYLENOL) 650 MG TbSR, Take 1 tablet (650 mg total) by mouth every 8 (eight) hours as needed., Disp: , Rfl: 0    atorvastatin (LIPITOR) 10 MG  tablet, Take 1 tablet (10 mg total) by mouth once daily., Disp: 90 tablet, Rfl: 3    ivermectin (SOOLANTRA) 1 % Crea, APPLY TOPICALLY TO THE AFFECTED AREAS 2 TIMES DAILY., Disp: 45 g, Rfl: 2    lisinopriL (PRINIVIL,ZESTRIL) 40 MG tablet, Take 1 tablet by mouth once daily, Disp: 90 tablet, Rfl: 3    mupirocin (BACTROBAN) 2 % ointment, Apply topically 3 (three) times daily., Disp: 15 g, Rfl: 0    Review of patient's allergies indicates:   Allergen Reactions    Clindamycin Itching          REVIEW OF SYSTEMS:  Constitution: Negative. Negative for chills, fever and night sweats.   HENT: Negative for congestion and headaches.    Eyes: Negative for blurred vision, left vision loss and right vision loss.   Cardiovascular: Negative for chest pain and syncope.   Respiratory: Negative for cough and shortness of breath.    Endocrine: Negative for polydipsia, polyphagia and polyuria.   Hematologic/Lymphatic: Negative for bleeding problem. Does not bruise/bleed easily.   Skin: Negative for dry skin, itching and rash.   Musculoskeletal: Negative for falls.  Positive for left shoulder pain and muscle weakness.   Gastrointestinal: Negative for abdominal pain and bowel incontinence.   Genitourinary: Negative for bladder incontinence and nocturia.   Neurological: Negative for disturbances in coordination, loss of balance and seizures.   Psychiatric/Behavioral: Negative for depression. The patient does not have insomnia.    Allergic/Immunologic: Negative for hives and persistent infections.      PHYSICAL EXAMINATION:  Vitals:  /73   Pulse 91   Ht 6' (1.829 m)   Wt 106.6 kg (235 lb)   BMI 31.87 kg/m²    General: The patient is alert and oriented x 3.  Mood is pleasant.  Observation of ears, eyes and nose reveal no gross abnormalities.  No labored breathing observed.  Gait is coordinated. Patient can toe walk and heel walk without difficulty.      LEFT Shoulder / Upper Extremity Exam    OBSERVATION:      Swelling  none  Deformity  none   Discoloration  none   Scapular winging none   Scars   none  Atrophy  none    TENDERNESS / CREPITUS (T/C):          T/C      T/C   Clavicle   -/-  SUPRAspinatus    +/-     AC Jt.    -/-  INFRAspinatus  -/-    SC Jt.    -/-  Deltoid    -/-      G. Tuberosity  -/-  LH BICEP groove  -/-   Acromion:  -/-  Midline Neck   -/-     Scapular Spine -/-  Trapezium   -/-   SMA Scapula  -/-  GH jt. line - post  -/-     Scapulothoracic  -/-         ROM: (* = with pain)  Right shoulder   Left shoulder        AROM (PROM)   AROM (PROM)   FE    170° (175°)     160° (175°)     ER at 0°    60°  (65°)    60°  (65°)   ER at 90° ABD  90°  (90°)    90°  (90°)   IR at 90°  ABD   NA  (40°)     NA  (40°)      IR (spine level)   T10     T10    STRENGTH: (* = with pain) Right shoulder   Left shoulder    SCAPTION   5/5    4+/5    IR    5/5    5/5   ER    5/5    5/5   BICEPS   5/5    5/5   Deltoid    5/5    5/5     SIGNS:  Painful side       NEER   -    OAMRIMARS  neg    HENAO   -    SPEEDS  neg     DROP ARM   -   BELLY PRESS neg   Superior escape none    LIFT-OFF  neg   X-Body ADD    neg    MOVING VALGUS neg        STABILITY TESTING    Right shoulder   Left shoulder    Translation     Anterior  up face     up face    Posterior  up face    up face    Sulcus   < 10mm    < 10 mm     Signs   Apprehension   neg      neg       Relocation   no change     no change      Jerk test  neg     neg    EXTREMITY NEURO-VASCULAR EXAM:    Sensation grossly intact to light touch all dermatomal regions.    DTR 2+ Biceps, Triceps, BR and Negative Zoes sign   Grossly intact motor function at Elbow, Wrist and Hand   Distal pulses radial and ulnar 2+, brisk cap refill, symmetric.      NECK:  Painless FROM and spinous processes non-tender. Negative Spurlings sign.      OTHER FINDINGS:  - scapular dyskinesia    XRAYS:  Xrays including AP, Outlet and Axillary Lateral of Left shoulder are ordered / images reviewed by  me:   No fracture dislocation or other pathology   Some calcific tendonosis   Proximal migration of humeral head: None   GH arthritis: None          ASSESSMENT:   Left shoulder pain, possible:  1. Left shoulder pain, unspecified chronicity        MRI read showing mild GH DJD, pull thickness partial tear of SS. AC joint arthrosis     PLAN:      1. Continue PT  2. F/u 2-3 months    All questions were answered, patient will contact us for questions or concerns in the interim.

## 2023-09-20 ENCOUNTER — OFFICE VISIT (OUTPATIENT)
Dept: DERMATOLOGY | Facility: CLINIC | Age: 73
End: 2023-09-20
Payer: MEDICARE

## 2023-09-20 DIAGNOSIS — Z12.83 SKIN CANCER SCREENING: ICD-10-CM

## 2023-09-20 DIAGNOSIS — L82.1 SEBORRHEIC KERATOSES: ICD-10-CM

## 2023-09-20 DIAGNOSIS — D22.9 MULTIPLE BENIGN NEVI: ICD-10-CM

## 2023-09-20 DIAGNOSIS — L30.4 INTERTRIGO: ICD-10-CM

## 2023-09-20 DIAGNOSIS — L72.9 SKIN CYST: Primary | ICD-10-CM

## 2023-09-20 PROCEDURE — 99999 PR PBB SHADOW E&M-EST. PATIENT-LVL II: CPT | Mod: PBBFAC,,, | Performed by: DERMATOLOGY

## 2023-09-20 PROCEDURE — 99213 OFFICE O/P EST LOW 20 MIN: CPT | Mod: S$PBB,,, | Performed by: DERMATOLOGY

## 2023-09-20 PROCEDURE — 99999 PR PBB SHADOW E&M-EST. PATIENT-LVL II: ICD-10-PCS | Mod: PBBFAC,,, | Performed by: DERMATOLOGY

## 2023-09-20 PROCEDURE — 99213 PR OFFICE/OUTPT VISIT, EST, LEVL III, 20-29 MIN: ICD-10-PCS | Mod: S$PBB,,, | Performed by: DERMATOLOGY

## 2023-09-20 PROCEDURE — 99212 OFFICE O/P EST SF 10 MIN: CPT | Mod: PBBFAC,PN | Performed by: DERMATOLOGY

## 2023-09-20 NOTE — PROGRESS NOTES
Subjective:      Patient ID:  Riky Salcedo is a 73 y.o. male who presents for   Chief Complaint   Patient presents with    Skin Check     UBSE     Patient here for Upper Body Skin Exam    Last seen by dermatologist: 2021     no - personal history of atypical moles removed  no - personal history of MM   no - family history of MM  yes - childhood blistering sunburns  no - tanning bed use  no - personal history of NMSC    Patient with specific complaint of lesion(s)  Location: [facility]  Duration: Pt.stated a couple years  Symptoms: sore,irritated  Relieving factors/Previous treatments: none              Review of Systems   Constitutional:  Negative for fever and chills.   HENT:  Negative for sore throat.    Respiratory:  Negative for cough.    Skin:  Positive for wears hat. Negative for daily sunscreen use, activity-related sunscreen use and recent sunburn.   Hematologic/Lymphatic: Does not bruise/bleed easily.       Objective:   Physical Exam   Constitutional: He appears well-developed and well-nourished.   Neurological: He is alert and oriented to person, place, and time.   Psychiatric: He has a normal mood and affect.   Skin:   Areas Examined (abnormalities noted in diagram):   Scalp / Hair Palpated and Inspected  Head / Face Inspection Performed  Neck Inspection Performed  Chest / Axilla Inspection Performed  Abdomen Inspection Performed  Back Inspection Performed  RUE Inspected  LUE Inspection Performed                 Diagram Legend     Erythematous scaling macule/papule c/w actinic keratosis       Vascular papule c/w angioma      Pigmented verrucoid papule/plaque c/w seborrheic keratosis      Yellow umbilicated papule c/w sebaceous hyperplasia      Irregularly shaped tan macule c/w lentigo     1-2 mm smooth white papules consistent with Milia      Movable subcutaneous cyst with punctum c/w epidermal inclusion cyst      Subcutaneous movable cyst c/w pilar cyst      Firm pink to brown papule c/w  dermatofibroma      Pedunculated fleshy papule(s) c/w skin tag(s)      Evenly pigmented macule c/w junctional nevus     Mildly variegated pigmented, slightly irregular-bordered macule c/w mildly atypical nevus      Flesh colored to evenly pigmented papule c/w intradermal nevus       Pink pearly papule/plaque c/w basal cell carcinoma      Erythematous hyperkeratotic cursted plaque c/w SCC      Surgical scar with no sign of skin cancer recurrence      Open and closed comedones      Inflammatory papules and pustules      Verrucoid papule consistent consistent with wart     Erythematous eczematous patches and plaques     Dystrophic onycholytic nail with subungual debris c/w onychomycosis     Umbilicated papule    Erythematous-base heme-crusted tan verrucoid plaque consistent with inflamed seborrheic keratosis     Erythematous Silvery Scaling Plaque c/w Psoriasis     See annotation      Assessment / Plan:        Skin cyst  X2.  Discussed with patient the likelihood that this lesion is an epidermal cyst caused by an involuted lining of skin with dead skin trapped inside.  Cysts do not have a malignant potential but can become infected and turn into abscesses with possible cellulitis.  Discussed the option of warm compresses if infected with oral antibiotics.  Discussed the option of surgical excision with scar, possible recurrence, hematoma, and infection.  Patient to consider these options.    Multiple benign nevi  Discussed all of the following with the patient.    Patient to check their breasts (if applicable), buttocks, and groin with a mirror.  Patient deferred our examination of these areas today.    Each month, check your body for any spots such as freckles, age spots, and moles.  Watch for color changes and shape changes and growth in size.    Have your hernandez or  pay attention to any dark color changes to moles of the scalp.    Moles, also called nevi, are small, colored (pigmented) marks on the skin.  They have no known purpose. Many moles appear before age 30, but they also increase frequently as people age. Moles most often are not cancer (benign) and are harmless. But some become cancerous (malignant). Thats why you need to watch the moles on your body and tell your healthcare provider about any that concern you.  Brochure given for patient education.    Seborrheic keratoses  Discussed with patient the benign nature of these lesions and that no treatment is indicated.    Intertrigo  Pt co ho in groin.   service used today.  Cont pt's clot bid prn.  Reviewed with patient different treatment options and associated risks.    Skin cancer screening  Waist up examination performed today.  No suspicious lesions were noted unless otherwise documented in this note.    Patient did not want a waist down exam today.  No other seriously suspicious lesions noted for body areas examined today.  Patient to inform of us if they notice any dark or changing or suspicious spots in areas not examined today.  Follow up for routine monitoring recommended to patient.    Instructed patient to watch out for dark spots, bleeding spots, crusty spots, sores that break out repeatedly in the same spot.  These characteristics are risk factors for skin cancer, and patient is to notify us if they experience any of these symptoms.             Follow up in about 1 year (around 9/20/2024) for TBSE.

## 2023-10-09 ENCOUNTER — OFFICE VISIT (OUTPATIENT)
Dept: UROLOGY | Facility: CLINIC | Age: 73
End: 2023-10-09
Payer: MEDICARE

## 2023-10-09 VITALS
SYSTOLIC BLOOD PRESSURE: 130 MMHG | WEIGHT: 235 LBS | HEART RATE: 78 BPM | BODY MASS INDEX: 31.83 KG/M2 | DIASTOLIC BLOOD PRESSURE: 78 MMHG | HEIGHT: 72 IN

## 2023-10-09 DIAGNOSIS — N52.01 ERECTILE DYSFUNCTION DUE TO ARTERIAL INSUFFICIENCY: ICD-10-CM

## 2023-10-09 DIAGNOSIS — N13.8 BPH WITH URINARY OBSTRUCTION: ICD-10-CM

## 2023-10-09 DIAGNOSIS — N40.1 BPH WITH URINARY OBSTRUCTION: ICD-10-CM

## 2023-10-09 DIAGNOSIS — L29.1 SCROTAL ITCHING: ICD-10-CM

## 2023-10-09 DIAGNOSIS — N48.6 PEYRONIE'S DISEASE: Primary | ICD-10-CM

## 2023-10-09 PROBLEM — Z12.11 ENCOUNTER FOR SCREENING COLONOSCOPY: Status: RESOLVED | Noted: 2021-07-29 | Resolved: 2023-10-09

## 2023-10-09 PROCEDURE — 99214 PR OFFICE/OUTPT VISIT, EST, LEVL IV, 30-39 MIN: ICD-10-PCS | Mod: S$PBB,,, | Performed by: UROLOGY

## 2023-10-09 PROCEDURE — 99213 OFFICE O/P EST LOW 20 MIN: CPT | Mod: PBBFAC | Performed by: UROLOGY

## 2023-10-09 PROCEDURE — 99999 PR PBB SHADOW E&M-EST. PATIENT-LVL III: CPT | Mod: PBBFAC,,, | Performed by: UROLOGY

## 2023-10-09 PROCEDURE — 99214 OFFICE O/P EST MOD 30 MIN: CPT | Mod: S$PBB,,, | Performed by: UROLOGY

## 2023-10-09 PROCEDURE — 99999 PR PBB SHADOW E&M-EST. PATIENT-LVL III: ICD-10-PCS | Mod: PBBFAC,,, | Performed by: UROLOGY

## 2023-10-09 RX ORDER — SILDENAFIL 100 MG/1
100 TABLET, FILM COATED ORAL DAILY PRN
Qty: 10 TABLET | Refills: 11 | Status: SHIPPED | OUTPATIENT
Start: 2023-10-09 | End: 2024-10-08

## 2023-10-09 NOTE — PROGRESS NOTES
CHIEF COMPLAINT:    Mr. Salcedo is a 73 y.o. male presenting with peyronie's disease.    PRESENTING ILLNESS:    Riky Salcedo is a 73 y.o. male who c/o peyronie's disease.  Has been present for > 1 year.  I saw him over 10 years ago for this, and he now states that his curve is no longer bothersome.  His main complaint regarding the peyronie's is a loss of penile size.    He c/o scrotal itching.    He does have mild ED.  Would like to try a PDE-5i.    He has some LUTS.  He has nocturia x 1 and some urinary frequency, but he's pleased with how he voids.    REVIEW OF SYSTEMS:    Riky Salcedo denies headache, blurred vision, fever, nausea, vomiting, chills, abdominal pain, chest pain, sore throat, bleeding per rectum, cough, SOB, recent loss of consciousness, recent mental status changes, seizures, dizziness, or upper or lower extremity weakness.    CHAO  1. 3  2. 4  3. 4  4. 4  5. 4      PATIENT HISTORY:    Past Medical History:   Diagnosis Date    Cataract     Fever blister     HSV-1 (herpes simplex virus 1) infection     Hyperlipidemia     Hypertension     Keloid cicatrix     Low serum testosterone level     Obese     Peyronie's disease        Past Surgical History:   Procedure Laterality Date    COLONOSCOPY N/A 8/5/2016    Procedure: COLONOSCOPY;  Surgeon: Denis Herzog MD;  Location: [address]);  Service: Endoscopy;  Laterality: N/A;  has hemorrhoid that may need to be banded    COLONOSCOPY N/A 7/29/2021    Procedure: COLONOSCOPY;  Surgeon: Deejay Corley MD;  Location: [address]);  Service: Endoscopy;  Laterality: N/A;  Pt. is fully vaccinated.EC    HERNIA REPAIR      LIPOMA RESECTION N/A 11/30/2022    Procedure: EXCISION, LIPOMA Back;  Surgeon: Clayton Madrid MD;  Location: [address];  Service: General;  Laterality: N/A;    UMBILICAL HERNIA REPAIR         Family History   Problem Relation Age of Onset    Heart disease Mother     Heart disease Father     Hypertension  Father     Heart disease Paternal Grandmother     Hypertension Paternal Grandmother     Colon cancer Neg Hx     Crohn's disease Neg Hx     Ulcerative colitis Neg Hx     Melanoma Neg Hx     Prostate cancer Neg Hx     Kidney disease Neg Hx        Social History     Socioeconomic History    Marital status:    Occupational History    Occupation: Retired     Employer: [employer]   Tobacco Use    Smoking status: Former     Types: Cigars    Smokeless tobacco: Former   Substance and Sexual Activity    Alcohol use: Yes     Alcohol/week: 4.2 standard drinks of alcohol     Types: 5 Standard drinks or equivalent per week     Comment: 4-5 beers a day    Drug use: No    Sexual activity: Yes     Partners: Female       Allergies:  Clindamycin    Medications:    Current Outpatient Medications:     acetaminophen (TYLENOL) 650 MG TbSR, Take 1 tablet (650 mg total) by mouth every 8 (eight) hours as needed., Disp: , Rfl: 0    ivermectin (SOOLANTRA) 1 % Crea, APPLY TOPICALLY TO THE AFFECTED AREAS 2 TIMES DAILY., Disp: 45 g, Rfl: 2    lisinopriL (PRINIVIL,ZESTRIL) 40 MG tablet, Take 1 tablet by mouth once daily, Disp: 90 tablet, Rfl: 3    mupirocin (BACTROBAN) 2 % ointment, Apply topically 3 (three) times daily., Disp: 15 g, Rfl: 0    atorvastatin (LIPITOR) 10 MG tablet, Take 1 tablet (10 mg total) by mouth once daily. (Patient not taking: Reported on 10/9/2023), Disp: 90 tablet, Rfl: 3    sildenafiL (VIAGRA) 100 MG tablet, Take 1 tablet (100 mg total) by mouth daily as needed for Erectile Dysfunction. Dispense generic, Disp: 10 tablet, Rfl: 11    PHYSICAL EXAMINATION:    The patient generally appears in good health, is appropriately interactive, and is in no apparent distress.     Eyes: anicteric sclerae, moist conjunctivae; no lid-lag; PERRLA     HENT: Atraumatic; oropharynx clear with moist mucous membranes and no mucosal ulcerations;normal hard and soft palate.  No evidence of lymphadenopathy.    Neck: Trachea  midline.  No thyromegaly.    Skin: No lesions.    Mental: Cooperative with normal affect.  Is oriented to time, place, and person.    Neuro: Grossly intact.    Chest: Normal inspiratory effort.   No accessory muscles.  No audible wheezes.  Respirations symmetric on inspiration and expiration.    Heart: Regular rhythm.      Abdomen:  Soft, non-tender. No masses or organomegaly. Bladder is not palpable. No evidence of flank discomfort. No evidence of inguinal hernia.    Genitourinary: The penis has a plaque c/w peyronie's on the shaft. The urethral meatus is normal. The testes, epididymides, and cord structures are normal in size and contour bilaterally. The scrotum is normal in size and contour.    Normal anal sphincter tone. No rectal mass.    The prostate is smooth.  Normal landmarks. Lateral sulci. Median furrow intact.  No nodularity or induration. Seminal vesicles are normal.    Extremities: No clubbing, cyanosis, or edema      LABS:      Lab Results   Component Value Date    PSA 0.67 11/01/2019    PSA 0.63 06/28/2018    PSA 0.70 03/29/2017    PSADIAG 0.60 05/22/2014       IMPRESSION:    Encounter Diagnoses   Name Primary?    Peyronie's disease Yes    Scrotal itching     BPH with urinary obstruction     Erectile dysfunction due to arterial insufficiency          PLAN:    1. Discussed the pathophysiology of peyronie's.  Would observe it as it is not interfering with penetration.  2. Discussed that I can't increase the size of his penis.  3. Discussed options for his ED.  He'd like Viagra. Side effects discussed.  A new Rx was given   4. Will consult derm for the scrotal itching.  5. Will observe his LUTS as they don't bother him.  6. RTC 1 year to see an YAMILA,    Copy to:

## 2023-10-11 ENCOUNTER — OFFICE VISIT (OUTPATIENT)
Dept: DERMATOLOGY | Facility: CLINIC | Age: 73
End: 2023-10-11
Payer: MEDICARE

## 2023-10-11 DIAGNOSIS — L30.4 INTERTRIGO: Primary | ICD-10-CM

## 2023-10-11 DIAGNOSIS — L29.1 SCROTAL ITCHING: ICD-10-CM

## 2023-10-11 DIAGNOSIS — E64.0 SEQUELAE OF PROTEIN-CALORIE MALNUTRITION: ICD-10-CM

## 2023-10-11 DIAGNOSIS — L29.1 ITCHY SCROTUM: ICD-10-CM

## 2023-10-11 DIAGNOSIS — Z76.89 ENCOUNTER FOR SKIN CARE: ICD-10-CM

## 2023-10-11 DIAGNOSIS — E55.9 VITAMIN D DEFICIENCY, UNSPECIFIED: ICD-10-CM

## 2023-10-11 PROCEDURE — 99212 OFFICE O/P EST SF 10 MIN: CPT | Mod: PBBFAC,PN | Performed by: DERMATOLOGY

## 2023-10-11 PROCEDURE — 99214 PR OFFICE/OUTPT VISIT, EST, LEVL IV, 30-39 MIN: ICD-10-PCS | Mod: S$PBB,,, | Performed by: DERMATOLOGY

## 2023-10-11 PROCEDURE — 99999 PR PBB SHADOW E&M-EST. PATIENT-LVL II: CPT | Mod: PBBFAC,,, | Performed by: DERMATOLOGY

## 2023-10-11 PROCEDURE — 99999 PR PBB SHADOW E&M-EST. PATIENT-LVL II: ICD-10-PCS | Mod: PBBFAC,,, | Performed by: DERMATOLOGY

## 2023-10-11 PROCEDURE — 99214 OFFICE O/P EST MOD 30 MIN: CPT | Mod: S$PBB,,, | Performed by: DERMATOLOGY

## 2023-10-11 NOTE — PATIENT INSTRUCTIONS
Shower sooner than later after exercise, exertion, or sweating.  Can do wash cloth wipes if more convenient.  Sweat can cause irritation and may exacerbate skin conditions.  Use corn starch as a drying powder.     Avoid hot water

## 2023-10-11 NOTE — PROGRESS NOTES
Subjective:      Patient ID:  Riky Salcedo is a 73 y.o. male who presents for   Chief Complaint   Patient presents with    Rash     Groin      Rash - Initial  Affected locations: groin and genitalia  Signs / symptoms: itching and irritated  Severity: mild to moderate  Timing: constant  Relieving factors/Treatments tried: OTC antifungals      Review of Systems   Constitutional: Negative.    HENT: Negative.     Respiratory: Negative.     Musculoskeletal: Negative.    Skin:  Positive for rash.       Objective:   Physical Exam   Constitutional: He appears well-developed and well-nourished.   Neurological: He is alert and oriented to person, place, and time.   Psychiatric: He has a normal mood and affect.        Diagram Legend     Erythematous scaling macule/papule c/w actinic keratosis       Vascular papule c/w angioma      Pigmented verrucoid papule/plaque c/w seborrheic keratosis      Yellow umbilicated papule c/w sebaceous hyperplasia      Irregularly shaped tan macule c/w lentigo     1-2 mm smooth white papules consistent with Milia      Movable subcutaneous cyst with punctum c/w epidermal inclusion cyst      Subcutaneous movable cyst c/w pilar cyst      Firm pink to brown papule c/w dermatofibroma      Pedunculated fleshy papule(s) c/w skin tag(s)      Evenly pigmented macule c/w junctional nevus     Mildly variegated pigmented, slightly irregular-bordered macule c/w mildly atypical nevus      Flesh colored to evenly pigmented papule c/w intradermal nevus       Pink pearly papule/plaque c/w basal cell carcinoma      Erythematous hyperkeratotic cursted plaque c/w SCC      Surgical scar with no sign of skin cancer recurrence      Open and closed comedones      Inflammatory papules and pustules      Verrucoid papule consistent consistent with wart     Erythematous eczematous patches and plaques     Dystrophic onycholytic nail with subungual debris c/w onychomycosis     Umbilicated papule    Erythematous-base  heme-crusted tan verrucoid plaque consistent with inflamed seborrheic keratosis     Erythematous Silvery Scaling Plaque c/w Psoriasis     See annotation  Scrotum mild pink.  Color scar of r groin fold.      Assessment / Plan:        Intertrigo  Educated patient about keeping body folds free of sweat with routine wiping and regular cornstarch usage in addition to prescription therapy.  Reviewed with patient different treatment options and associated risks.  Pt wants to cont his clot otc cream for now.      Itchy scrotum  -     Zinc; Future; Expected date: 10/11/2023  -     Vitamin B12; Future; Expected date: 10/11/2023  -     Streptococcal Antibodes (ASO Titer); Future; Expected date: 10/11/2023  -     Ferritin; Future; Expected date: 10/11/2023  -     Iron and TIBC; Future; Expected date: 10/11/2023  -     Calcitriol; Future; Expected date: 10/11/2023  -     JERRI; Future; Expected date: 10/11/2023  Discussed with patient the need for laboratory work up for further evaluation.  Discussed that such investigation may not be helpful.  Watch out for nerve itch.  We may need to do lab work up to evaluate this further, especially if basic itch therapies don't work.  Paresthesia may be aggravated by certain low vitamin levels.  [facility] labs and or records and notes reviewed and considered for their impact on our clinical decision making today.  Stop desonide.  Discussed with patient the risks of topical and injectable steroids, including, but not limited, to atrophy, hypopigmentation, rosacea, acne, glaucoma, cataracts, adrenal suppression, striae.  Do not touch eyes with medication.    Encounter for skin care  Shower sooner than later after exercise, exertion, or sweating.  Can do wash cloth wipes if more convenient.  Sweat can cause irritation and may exacerbate skin conditions.  Use corn starch as a drying powder.    Sequelae of protein-calorie malnutrition  -     Vitamin B12; Future; Expected date:  10/11/2023  This is suspected.    Vitamin D deficiency, unspecified  -     Calcitriol; Future; Expected date: 10/11/2023  This is suspected.             Follow up if symptoms worsen or fail to improve, for Post labs.

## 2023-10-12 ENCOUNTER — LAB VISIT (OUTPATIENT)
Dept: LAB | Facility: HOSPITAL | Age: 73
End: 2023-10-12
Attending: DERMATOLOGY
Payer: MEDICARE

## 2023-10-12 ENCOUNTER — TELEPHONE (OUTPATIENT)
Dept: OTOLARYNGOLOGY | Facility: CLINIC | Age: 73
End: 2023-10-12
Payer: MEDICARE

## 2023-10-12 DIAGNOSIS — E55.9 VITAMIN D DEFICIENCY, UNSPECIFIED: ICD-10-CM

## 2023-10-12 DIAGNOSIS — L29.1 ITCHY SCROTUM: ICD-10-CM

## 2023-10-12 DIAGNOSIS — E64.0 SEQUELAE OF PROTEIN-CALORIE MALNUTRITION: ICD-10-CM

## 2023-10-12 LAB
FERRITIN SERPL-MCNC: 189 NG/ML (ref 20–300)
IRON SERPL-MCNC: 157 UG/DL (ref 45–160)
SATURATED IRON: 46 % (ref 20–50)
TOTAL IRON BINDING CAPACITY: 339 UG/DL (ref 250–450)
TRANSFERRIN SERPL-MCNC: 229 MG/DL (ref 200–375)
VIT B12 SERPL-MCNC: 445 PG/ML (ref 210–950)

## 2023-10-12 PROCEDURE — 83540 ASSAY OF IRON: CPT | Performed by: DERMATOLOGY

## 2023-10-12 PROCEDURE — 82607 VITAMIN B-12: CPT | Performed by: DERMATOLOGY

## 2023-10-12 PROCEDURE — 82652 VIT D 1 25-DIHYDROXY: CPT | Performed by: DERMATOLOGY

## 2023-10-12 PROCEDURE — 84466 ASSAY OF TRANSFERRIN: CPT | Performed by: DERMATOLOGY

## 2023-10-12 PROCEDURE — 84630 ASSAY OF ZINC: CPT | Performed by: DERMATOLOGY

## 2023-10-12 PROCEDURE — 82728 ASSAY OF FERRITIN: CPT | Performed by: DERMATOLOGY

## 2023-10-12 PROCEDURE — 86038 ANTINUCLEAR ANTIBODIES: CPT | Performed by: DERMATOLOGY

## 2023-10-12 PROCEDURE — 86060 ANTISTREPTOLYSIN O TITER: CPT | Performed by: DERMATOLOGY

## 2023-10-12 NOTE — TELEPHONE ENCOUNTER
LVM for pt about having audio bc 1 wasn't in the system. and that he will need the audio before the visit.

## 2023-10-13 LAB — ANA SER QL IF: NORMAL

## 2023-10-16 LAB
1,25(OH)2D3 SERPL-MCNC: 50 PG/ML (ref 20–79)
ASO AB SERPL-ACNC: 41 IU/ML
ZINC SERPL-MCNC: 62 UG/DL (ref 60–130)

## 2023-10-17 ENCOUNTER — PATIENT MESSAGE (OUTPATIENT)
Dept: DERMATOLOGY | Facility: CLINIC | Age: 73
End: 2023-10-17
Payer: MEDICARE

## 2023-10-18 ENCOUNTER — OFFICE VISIT (OUTPATIENT)
Dept: OTOLARYNGOLOGY | Facility: CLINIC | Age: 73
End: 2023-10-18
Payer: MEDICARE

## 2023-10-18 DIAGNOSIS — H90.3 SENSORINEURAL HEARING LOSS (SNHL) OF BOTH EARS: ICD-10-CM

## 2023-10-18 DIAGNOSIS — H93.13 TINNITUS OF BOTH EARS: Primary | ICD-10-CM

## 2023-10-18 PROCEDURE — 99214 OFFICE O/P EST MOD 30 MIN: CPT | Mod: S$PBB,,, | Performed by: NURSE PRACTITIONER

## 2023-10-18 PROCEDURE — 99212 OFFICE O/P EST SF 10 MIN: CPT | Mod: PBBFAC | Performed by: NURSE PRACTITIONER

## 2023-10-18 PROCEDURE — 99214 PR OFFICE/OUTPT VISIT, EST, LEVL IV, 30-39 MIN: ICD-10-PCS | Mod: S$PBB,,, | Performed by: NURSE PRACTITIONER

## 2023-10-18 PROCEDURE — 99999 PR PBB SHADOW E&M-EST. PATIENT-LVL II: ICD-10-PCS | Mod: PBBFAC,,, | Performed by: NURSE PRACTITIONER

## 2023-10-18 PROCEDURE — 99999 PR PBB SHADOW E&M-EST. PATIENT-LVL II: CPT | Mod: PBBFAC,,, | Performed by: NURSE PRACTITIONER

## 2023-10-18 NOTE — PROGRESS NOTES
Subjective:   Riky Salcedo is a 73 y.o. male who was self-referred for tinnitus. He reports bilateral tinnitus that had been present for about 1 year, but worsened suddenly-about 2 month ago. He is very anxious about the tinnitus and wants to know what therapies are available to him. He is also worried that there are small mites in his ears. He denies any otalgia, otorrhea, ear fullness/pressure, tinnitus or vertigo. He feels like he hears well. Denies migraines or recent head trauma. Denies regular ASA/NSAID or tylenol use.There is not a family history of hearing loss at a young age. There is not a prior history of ear surgery. There is not a prior history of ear infections. There is not a history of ear trauma. 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     Past Medical History  He has a past medical history of Cataract, Fever blister, HSV-1 (herpes simplex virus 1) infection, Hyperlipidemia, Hypertension, Keloid cicatrix, Low serum testosterone level, Obese, and Peyronie's disease.    Past Surgical History  He has a past surgical history that includes Umbilical hernia repair; Hernia repair; Colonoscopy (N/A, 8/5/2016); Colonoscopy (N/A, 7/29/2021); and Lipoma resection (N/A, 11/30/2022).    Family History  His family history includes Heart disease in his father, mother, and paternal grandmother; Hypertension in his father and paternal grandmother.    Social History  He reports that he has quit smoking. His smoking use included cigars. He has quit using smokeless tobacco. He reports current alcohol use of about 4.2 standard drinks of alcohol per week. He reports that he does not use drugs.    Allergies  He is allergic to clindamycin.    Medications  He has a current medication list which includes the following prescription(s): acetaminophen, ivermectin, lisinopril, mupirocin, sildenafil, and atorvastatin.  Review of Systems     Constitutional: Negative for  chills and fever.      HENT: Positive for hearing loss and ringing in the ears.  Negative for ear discharge, ear infection and ear pain.      Neurological: Negative for dizziness, headaches and light-headedness.          Objective:     Constitutional:   He is oriented to person, place, and time. He appears well-developed and well-nourished. He appears alert. He is cooperative.  Non-toxic appearance. He does not have a sickly appearance. He does not appear ill. Normal speech.      Head:  Normocephalic and atraumatic. Not macrocephalic and not microcephalic. Head is without raccoon's eyes, without Wyatt's sign, without abrasion, without laceration, without right periorbital erythema, without left periorbital erythema and without TMJ tenderness.     Ears:    Right Ear: No lacerations. No drainage, swelling or tenderness. No foreign bodies. No mastoid tenderness. Tympanic membrane is not injected, not scarred, not perforated, not erythematous, not retracted and not bulging. No middle ear effusion. No hemotympanum.   Left Ear: No lacerations. No drainage, swelling or tenderness. No foreign bodies. No mastoid tenderness. Tympanic membrane is not injected, not scarred, not perforated, not erythematous, not retracted and not bulging.  No middle ear effusion. No hemotympanum.     Pulmonary/Chest:   Effort normal.     Psychiatric:   He has a normal mood and affect. His speech is normal and behavior is normal.     Neurological:   He is alert and oriented to person, place, and time.     Procedure  None    Imaging  No pertinent imaging available.  Audiogram          Assessment:     1. Tinnitus of both ears    2. Sensorineural hearing loss (SNHL) of both ears      Plan:   Tinnitus of both ears  -     Assessment of tinnitus; Future  Discussed the etiology of tinnitus and management strategies including the use of background sound enrichment. Further instructed that avoidance of caffeine, alcohol, tobacco, and stress can be of  benefit.     Sensorineural hearing loss (SNHL) of both ears  Audiometric testing interpretation consistent with high frequency sensorineural hearing loss. Discussed the etiology of SNHL. Hearing conservation in noisy environments.

## 2023-11-14 ENCOUNTER — OFFICE VISIT (OUTPATIENT)
Dept: SPORTS MEDICINE | Facility: CLINIC | Age: 73
End: 2023-11-14
Payer: MEDICARE

## 2023-11-14 VITALS
BODY MASS INDEX: 31.83 KG/M2 | DIASTOLIC BLOOD PRESSURE: 72 MMHG | HEART RATE: 75 BPM | HEIGHT: 72 IN | WEIGHT: 235 LBS | SYSTOLIC BLOOD PRESSURE: 125 MMHG

## 2023-11-14 DIAGNOSIS — S46.012A TRAUMATIC INCOMPLETE TEAR OF LEFT ROTATOR CUFF, INITIAL ENCOUNTER: Primary | ICD-10-CM

## 2023-11-14 PROCEDURE — 99213 OFFICE O/P EST LOW 20 MIN: CPT | Mod: PBBFAC | Performed by: ORTHOPAEDIC SURGERY

## 2023-11-14 PROCEDURE — 99999 PR PBB SHADOW E&M-EST. PATIENT-LVL III: ICD-10-PCS | Mod: PBBFAC,,, | Performed by: ORTHOPAEDIC SURGERY

## 2023-11-14 PROCEDURE — 99214 OFFICE O/P EST MOD 30 MIN: CPT | Mod: S$PBB,,, | Performed by: ORTHOPAEDIC SURGERY

## 2023-11-14 PROCEDURE — 99999 PR PBB SHADOW E&M-EST. PATIENT-LVL III: CPT | Mod: PBBFAC,,, | Performed by: ORTHOPAEDIC SURGERY

## 2023-11-14 PROCEDURE — 99214 PR OFFICE/OUTPT VISIT, EST, LEVL IV, 30-39 MIN: ICD-10-PCS | Mod: S$PBB,,, | Performed by: ORTHOPAEDIC SURGERY

## 2023-11-14 NOTE — PROGRESS NOTES
CC: LEFT shoulder pain    Riky Salcedo presents for follow up evaluation of his left shoulder. He states he has completed PT and has been good at doing his HEP. He reports his shoulder pain has improved. He occasinoally will have pain in certain positions but he is happy with the current plan of non-operative management.       Initial Hx:   73 y.o. Male with a 10 month history of left shoulder atraumatic pain.  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    He states that the pain is severe and not responding to any conservative care.      He reports that the pain and weakness is worse with overhead activity. It also bothers him at night.    Is affecting ADLs.  Pain is 4/10 at it's worst.      Past Medical History:   Diagnosis Date    Cataract     Fever blister     HSV-1 (herpes simplex virus 1) infection     Hyperlipidemia     Hypertension     Keloid cicatrix     Low serum testosterone level     Obese     Peyronie's disease        Past Surgical History:   Procedure Laterality Date    COLONOSCOPY N/A 8/5/2016    Procedure: COLONOSCOPY;  Surgeon: Denis Herzog MD;  Location: [address]);  Service: Endoscopy;  Laterality: N/A;  has hemorrhoid that may need to be banded    COLONOSCOPY N/A 7/29/2021    Procedure: COLONOSCOPY;  Surgeon: Deejay Corley MD;  Location: [address]);  Service: Endoscopy;  Laterality: N/A;  Pt. is fully vaccinated.EC    HERNIA REPAIR      LIPOMA RESECTION N/A 11/30/2022    Procedure: EXCISION, LIPOMA Back;  Surgeon: Clayton Madrid MD;  Location: [address];  Service: General;  Laterality: N/A;    UMBILICAL HERNIA REPAIR         Family History   Problem Relation Age of Onset    Heart disease Mother     Heart disease Father     Hypertension Father     Heart disease Paternal Grandmother     Hypertension Paternal Grandmother     Colon cancer Neg Hx     Crohn's  disease Neg Hx     Ulcerative colitis Neg Hx     Melanoma Neg Hx     Prostate cancer Neg Hx     Kidney disease Neg Hx          Current Outpatient Medications:     acetaminophen (TYLENOL) 650 MG TbSR, Take 1 tablet (650 mg total) by mouth every 8 (eight) hours as needed., Disp: , Rfl: 0    ivermectin (SOOLANTRA) 1 % Crea, APPLY TOPICALLY TO THE AFFECTED AREAS 2 TIMES DAILY., Disp: 45 g, Rfl: 2    lisinopriL (PRINIVIL,ZESTRIL) 40 MG tablet, Take 1 tablet by mouth once daily, Disp: 90 tablet, Rfl: 3    mupirocin (BACTROBAN) 2 % ointment, Apply topically 3 (three) times daily., Disp: 15 g, Rfl: 0    sildenafiL (VIAGRA) 100 MG tablet, Take 1 tablet (100 mg total) by mouth daily as needed for Erectile Dysfunction. Dispense generic, Disp: 10 tablet, Rfl: 11    atorvastatin (LIPITOR) 10 MG tablet, Take 1 tablet (10 mg total) by mouth once daily. (Patient not taking: Reported on 10/9/2023), Disp: 90 tablet, Rfl: 3    Review of patient's allergies indicates:   Allergen Reactions    Clindamycin Itching          REVIEW OF SYSTEMS:  Constitution: Negative. Negative for chills, fever and night sweats.   HENT: Negative for congestion and headaches.    Eyes: Negative for blurred vision, left vision loss and right vision loss.   Cardiovascular: Negative for chest pain and syncope.   Respiratory: Negative for cough and shortness of breath.    Endocrine: Negative for polydipsia, polyphagia and polyuria.   Hematologic/Lymphatic: Negative for bleeding problem. Does not bruise/bleed easily.   Skin: Negative for dry skin, itching and rash.   Musculoskeletal: Negative for falls.  Positive for left shoulder pain and muscle weakness.   Gastrointestinal: Negative for abdominal pain and bowel incontinence.   Genitourinary: Negative for bladder incontinence and nocturia.   Neurological: Negative for disturbances in coordination, loss of balance and seizures.   Psychiatric/Behavioral: Negative for depression. The patient does not have insomnia.     Allergic/Immunologic: Negative for hives and persistent infections.      PHYSICAL EXAMINATION:  Vitals:  /72   Pulse 75   Ht 6' (1.829 m)   Wt 106.6 kg (235 lb)   BMI 31.87 kg/m²    General: The patient is alert and oriented x 3.  Mood is pleasant.  Observation of ears, eyes and nose reveal no gross abnormalities.  No labored breathing observed.  Gait is coordinated. Patient can toe walk and heel walk without difficulty.      LEFT Shoulder / Upper Extremity Exam    OBSERVATION:     Swelling  none  Deformity  none   Discoloration  none   Scapular winging none   Scars   none  Atrophy  none    TENDERNESS / CREPITUS (T/C):          T/C      T/C   Clavicle   -/-  SUPRAspinatus    +/-     AC Jt.    -/-  INFRAspinatus  -/-    SC Jt.    -/-  Deltoid    -/-      G. Tuberosity  -/-  LH BICEP groove  -/-   Acromion:  -/-  Midline Neck   -/-     Scapular Spine -/-  Trapezium   -/-   SMA Scapula  -/-  GH jt. line - post  -/-     Scapulothoracic  -/-         ROM: (* = with pain)  Right shoulder   Left shoulder        AROM (PROM)   AROM (PROM)   FE    170° (175°)     160° (175°)     ER at 0°    60°  (65°)    60°  (65°)   ER at 90° ABD  90°  (90°)    90°  (90°)   IR at 90°  ABD   NA  (40°)     NA  (40°)      IR (spine level)   T10     T10    STRENGTH: (* = with pain) Right shoulder   Left shoulder    SCAPTION   5/5    4+/5    IR    5/5    5/5   ER    5/5    5/5   BICEPS   5/5    5/5   Deltoid    5/5    5/5     SIGNS:  Painful side       NEER   -    OMARIMARS  neg    HENAO   -    SPEEDS  neg     DROP ARM   -   BELLY PRESS neg   Superior escape none    LIFT-OFF  neg   X-Body ADD    neg    MOVING VALGUS neg        STABILITY TESTING    Right shoulder   Left shoulder    Translation     Anterior  up face     up face    Posterior  up face    up face    Sulcus   < 10mm    < 10 mm     Signs   Apprehension   neg      neg       Relocation   no change     no change      Jerk test  neg     neg    EXTREMITY NEURO-VASCULAR  EXAM:    Sensation grossly intact to light touch all dermatomal regions.    DTR 2+ Biceps, Triceps, BR and Negative Zoes sign   Grossly intact motor function at Elbow, Wrist and Hand   Distal pulses radial and ulnar 2+, brisk cap refill, symmetric.      NECK:  Painless FROM and spinous processes non-tender. Negative Spurlings sign.      OTHER FINDINGS:  - scapular dyskinesia    XRAYS:  Xrays including AP, Outlet and Axillary Lateral of Left shoulder are ordered / images reviewed by me:   No fracture dislocation or other pathology   Some calcific tendonosis   Proximal migration of humeral head: None   GH arthritis: None          ASSESSMENT:   Left shoulder pain, possible: Partial Thickness Rotator Cuff Tear,   No diagnosis found.      MRI read showing mild GH DJD, pull thickness partial tear of SS. AC joint arthrosis     PLAN:      1. Continue HEP  2. Follow up PRN    All questions were answered, patient will contact us for questions or concerns in the interim.

## 2023-11-20 ENCOUNTER — OFFICE VISIT (OUTPATIENT)
Dept: FAMILY MEDICINE | Facility: CLINIC | Age: 73
End: 2023-11-20
Payer: MEDICARE

## 2023-11-20 VITALS
BODY MASS INDEX: 31.53 KG/M2 | WEIGHT: 232.81 LBS | DIASTOLIC BLOOD PRESSURE: 64 MMHG | HEART RATE: 75 BPM | HEIGHT: 72 IN | OXYGEN SATURATION: 95 % | SYSTOLIC BLOOD PRESSURE: 122 MMHG

## 2023-11-20 DIAGNOSIS — H93.13 TINNITUS OF BOTH EARS: ICD-10-CM

## 2023-11-20 DIAGNOSIS — T46.6X5A ADVERSE EFFECT OF STATIN: ICD-10-CM

## 2023-11-20 DIAGNOSIS — R73.03 PREDIABETES: ICD-10-CM

## 2023-11-20 DIAGNOSIS — M67.912 ROTATOR CUFF DISORDER, LEFT: ICD-10-CM

## 2023-11-20 DIAGNOSIS — Z23 NEED FOR VACCINATION: ICD-10-CM

## 2023-11-20 DIAGNOSIS — I10 PRIMARY HYPERTENSION: Primary | ICD-10-CM

## 2023-11-20 PROCEDURE — 99999PBSHW FLU VACCINE - QUADRIVALENT - ADJUVANTED: Mod: PBBFAC,,,

## 2023-11-20 PROCEDURE — 99213 OFFICE O/P EST LOW 20 MIN: CPT | Mod: PBBFAC,PO,25 | Performed by: FAMILY MEDICINE

## 2023-11-20 PROCEDURE — 99215 PR OFFICE/OUTPT VISIT, EST, LEVL V, 40-54 MIN: ICD-10-PCS | Mod: S$PBB,,, | Performed by: FAMILY MEDICINE

## 2023-11-20 PROCEDURE — 99999PBSHW FLU VACCINE - QUADRIVALENT - ADJUVANTED: ICD-10-PCS | Mod: PBBFAC,,,

## 2023-11-20 PROCEDURE — 99215 OFFICE O/P EST HI 40 MIN: CPT | Mod: S$PBB,,, | Performed by: FAMILY MEDICINE

## 2023-11-20 PROCEDURE — G0008 ADMIN INFLUENZA VIRUS VAC: HCPCS | Mod: PBBFAC,PO

## 2023-11-20 PROCEDURE — 99999 PR PBB SHADOW E&M-EST. PATIENT-LVL III: ICD-10-PCS | Mod: PBBFAC,,, | Performed by: FAMILY MEDICINE

## 2023-11-20 PROCEDURE — 99999 PR PBB SHADOW E&M-EST. PATIENT-LVL III: CPT | Mod: PBBFAC,,, | Performed by: FAMILY MEDICINE

## 2023-11-20 NOTE — PROGRESS NOTES
Subjective     Patient ID: Riky Salcedo is a 73 y.o. male.    Chief Complaint: No chief complaint on file.    73 years old male came to the clinic for blood pressure check.  Blood pressure today was stable.  No chest pain, palpitation, orthopnea or PND.  Patient with follow-up with ENT for chronic bilateral tinnitus.  He is currently on physical therapy for left rotator cuff injury.  Last A1c was stable.  No Polyuria, polydipsia or polyphagia.      Review of Systems   Constitutional: Negative.    HENT: Negative.     Eyes: Negative.    Respiratory: Negative.     Cardiovascular: Negative.  Negative for chest pain, palpitations, leg swelling and claudication.   Gastrointestinal: Negative.    Endocrine: Negative for polydipsia, polyphagia and polyuria.   Genitourinary: Negative.    Musculoskeletal: Negative.    Integumentary:  Negative.   Neurological: Negative.    Psychiatric/Behavioral: Negative.            Objective     Physical Exam  Vitals and nursing note reviewed.   Constitutional:       General: He is not in acute distress.     Appearance: He is well-developed. He is not diaphoretic.   HENT:      Head: Normocephalic and atraumatic.      Right Ear: External ear normal.      Left Ear: External ear normal.      Nose: Nose normal.      Mouth/Throat:      Pharynx: No oropharyngeal exudate.   Eyes:      General: No scleral icterus.        Right eye: No discharge.         Left eye: No discharge.      Conjunctiva/sclera: Conjunctivae normal.      Pupils: Pupils are equal, round, and reactive to light.   Neck:      Thyroid: No thyromegaly.      Vascular: No JVD.      Trachea: No tracheal deviation.   Cardiovascular:      Rate and Rhythm: Normal rate and regular rhythm.      Heart sounds: Normal heart sounds. No murmur heard.     No friction rub. No gallop.   Pulmonary:      Effort: Pulmonary effort is normal. No respiratory distress.      Breath sounds: Normal breath sounds. No stridor. No wheezing or rales.   Chest:       Chest wall: No tenderness.   Abdominal:      General: Bowel sounds are normal. There is no distension.      Palpations: Abdomen is soft. There is no mass.      Tenderness: There is no abdominal tenderness. There is no guarding or rebound.   Musculoskeletal:         General: No tenderness. Normal range of motion.      Cervical back: Normal range of motion and neck supple.   Lymphadenopathy:      Cervical: No cervical adenopathy.   Skin:     General: Skin is warm and dry.      Coloration: Skin is not pale.      Findings: No erythema or rash.   Neurological:      Mental Status: He is alert and oriented to person, place, and time.      Cranial Nerves: No cranial nerve deficit.      Motor: No abnormal muscle tone.      Coordination: Coordination normal.      Deep Tendon Reflexes: Reflexes are normal and symmetric. Reflexes normal.   Psychiatric:         Behavior: Behavior normal.         Thought Content: Thought content normal.         Judgment: Judgment normal.            Assessment and Plan     1. Primary hypertension  -     Urinalysis; Future  -     Comprehensive Metabolic Panel; Future; Expected date: 11/20/2023  -     Lipid Panel; Future; Expected date: 11/20/2023  -     TSH; Future; Expected date: 11/20/2023  -     CBC Auto Differential; Future; Expected date: 11/20/2023    2. Need for vaccination  -     Influenza (FLUAD) - Quadrivalent (Adjuvanted) *Preferred* (65+) (PF)    3. Tinnitus of both ears    4. Rotator cuff disorder, left    5. Prediabetes  -     Hemoglobin A1C; Future; Expected date: 11/20/2023        Continue monitoring blood pressure at home, low sodium diet.          Follow up in about 6 months (around 5/20/2024), or if symptoms worsen or fail to improve.

## 2023-12-22 ENCOUNTER — TELEPHONE (OUTPATIENT)
Dept: FAMILY MEDICINE | Facility: CLINIC | Age: 73
End: 2023-12-22
Payer: OTHER GOVERNMENT

## 2023-12-22 DIAGNOSIS — R25.2 LEG CRAMPING: Primary | ICD-10-CM

## 2023-12-22 NOTE — TELEPHONE ENCOUNTER
Patient was contacted and informed the referral he has requested has been ordered. Patient was also informed a  will be in touch if not today, Tuesday. Patient verbalized understanding. Patient message was forwarded to Josefina Arreaga.

## 2023-12-26 ENCOUNTER — TELEPHONE (OUTPATIENT)
Dept: FAMILY MEDICINE | Facility: CLINIC | Age: 73
End: 2023-12-26
Payer: OTHER GOVERNMENT

## 2023-12-27 ENCOUNTER — OFFICE VISIT (OUTPATIENT)
Dept: SLEEP MEDICINE | Facility: CLINIC | Age: 73
End: 2023-12-27
Attending: PSYCHIATRY & NEUROLOGY
Payer: MEDICARE

## 2023-12-27 VITALS
BODY MASS INDEX: 32.02 KG/M2 | SYSTOLIC BLOOD PRESSURE: 152 MMHG | HEART RATE: 77 BPM | WEIGHT: 236.38 LBS | DIASTOLIC BLOOD PRESSURE: 83 MMHG | HEIGHT: 72 IN

## 2023-12-27 DIAGNOSIS — G47.30 SLEEP APNEA, UNSPECIFIED TYPE: Primary | ICD-10-CM

## 2023-12-27 PROCEDURE — 99999 PR PBB SHADOW E&M-EST. PATIENT-LVL III: CPT | Mod: PBBFAC,,, | Performed by: PSYCHIATRY & NEUROLOGY

## 2023-12-27 PROCEDURE — 99999 PR PBB SHADOW E&M-EST. PATIENT-LVL III: ICD-10-PCS | Mod: PBBFAC,,, | Performed by: PSYCHIATRY & NEUROLOGY

## 2023-12-27 PROCEDURE — 99213 OFFICE O/P EST LOW 20 MIN: CPT | Mod: PBBFAC | Performed by: PSYCHIATRY & NEUROLOGY

## 2023-12-27 PROCEDURE — 99204 OFFICE O/P NEW MOD 45 MIN: CPT | Mod: S$PBB,,, | Performed by: PSYCHIATRY & NEUROLOGY

## 2023-12-27 PROCEDURE — 99204 PR OFFICE/OUTPT VISIT, NEW, LEVL IV, 45-59 MIN: ICD-10-PCS | Mod: S$PBB,,, | Performed by: PSYCHIATRY & NEUROLOGY

## 2023-12-27 NOTE — PROGRESS NOTES
"       Riky Salcedo is a 73 y.o. male is here to be evaluated for a sleep disorder; referred by Self, Aaareferral.    Trouble sleeping - relates to tinnitus, affecting hos sleep.  HAs fed    HArd to fall asleep>stay asleep. CAnt use white noise machine - bothering his wife.  HAs not tried white noise earphones.  '  Worked nights for many years - worked 12 hrs shifts  alternating with working days.   Does not think that he snores.  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 + bad dreams/  Waking up with his heart racing a lot. Once or twice - his wife mentioned him talking oput of his sleep.      The patient feels rested upon awakening. Takes naps. 5 PM - for 1-2 hrs        + rotator cuff injury.    Riky Salcedo  denies symptoms concerning for parasomnia except for occasional somniloquy.  The patient  denies auxiliary symptoms of narcolepsy including sleep onset paralysis, hypnagogic hallucinations, sleep attacks and cataplexy.    Riky Salcedo denied symptoms concerning for RLS; nocturnal leg movements have not been noticed.   The patient does not experience sleep related leg cramps.       Interested if he has sleep apneas - as he is trying to find connection PTSD/sleep apnea/[facility] wise    Not seeing for PTSD.    Will refer to [facility] - if no sleep apnea.    Will do with parasomnia montage - due to bad dreams.     Bedtime: 2 AM   Sleep latency 30 min  Awakenings per night: could be 12 times.   Wake time: 7:30 AM    Medications pertinent to sleep  disorders taken currently: "scared of medications" - he believes he had tinitus from taking Lisinopril. Believes statins gave hhim nightmares.  Previous  medications taken  for sleep disorders:        DME:         PAST MEDICAL HISTORY:    Active Ambulatory Problems     Diagnosis Date Noted    Hypertension     HSV-1 (herpes simplex virus 1) infection     Male hypogonadism 02/28/2013    Peyronie's disease 02/28/2013    BPH with urinary " obstruction 02/28/2013    Lipoma of back 10/31/2013    Alcohol abuse with alcohol-induced disorder 06/11/2014    Hypercholesteremia 06/26/2014    Poor diet 02/16/2018    Class 1 obesity due to excess calories without serious comorbidity with body mass index (BMI) of 32.0 to 32.9 in adult 02/16/2018    Left arm pain 10/17/2019    Chest discomfort 10/17/2019    Palpitations 10/17/2019    Lung granuloma 10/28/2019    Erectile dysfunction due to arterial insufficiency 10/09/2023    Adverse effect of statin 11/20/2023     Resolved Ambulatory Problems     Diagnosis Date Noted    Low serum testosterone level     Erectile dysfunction 02/28/2013    Sebaceous cyst 10/31/2013    Infected sebaceous cyst 06/11/2014    Special screening for malignant neoplasms, colon 08/05/2016    Encounter for screening colonoscopy 07/29/2021     Past Medical History:   Diagnosis Date    Cataract     Fever blister     Hyperlipidemia     Keloid cicatrix     Obese                 PAST SURGICAL HISTORY:    Past Surgical History:   Procedure Laterality Date    COLONOSCOPY N/A 8/5/2016    Procedure: COLONOSCOPY;  Surgeon: Denis Herzog MD;  Location: [address]);  Service: Endoscopy;  Laterality: N/A;  has hemorrhoid that may need to be banded    COLONOSCOPY N/A 7/29/2021    Procedure: COLONOSCOPY;  Surgeon: Deejay Corley MD;  [facility]  Service: Endoscopy;  Laterality: N/A;  Pt. is fully vaccinated.EC    HERNIA REPAIR      LIPOMA RESECTION N/A 11/30/2022    Procedure: EXCISION, LIPOMA Back;  Surgeon: Clayton Madrid MD;  Location: [address];  Service: General;  Laterality: N/A;    UMBILICAL HERNIA REPAIR           FAMILY HISTORY:                Family History   Problem Relation Age of Onset    Heart disease Mother     Heart disease Father     Hypertension Father     Heart disease Paternal Grandmother     Hypertension Paternal Grandmother     Colon cancer Neg Hx     Crohn's disease Neg Hx      Ulcerative colitis Neg Hx     Melanoma Neg Hx     Prostate cancer Neg Hx     Kidney disease Neg Hx        SOCIAL HISTORY:          Tobacco:   Social History     Tobacco Use   Smoking Status Former    Types: Cigars   Smokeless Tobacco Former       alcohol use:    Social History     Substance and Sexual Activity   Alcohol Use Yes    Alcohol/week: 4.2 standard drinks of alcohol    Types: 5 Standard drinks or equivalent per week    Comment: 4-5 beers a day                   ALLERGIES:    Review of patient's allergies indicates:   Allergen Reactions    Clindamycin Itching    Statins-hmg-coa reductase inhibitors Palpitations       CURRENT MEDICATIONS:    Current Outpatient Medications   Medication Sig Dispense Refill    acetaminophen (TYLENOL) 650 MG TbSR Take 1 tablet (650 mg total) by mouth every 8 (eight) hours as needed.  0    ivermectin (SOOLANTRA) 1 % Crea APPLY TOPICALLY TO THE AFFECTED AREAS 2 TIMES DAILY. 45 g 2    lisinopriL (PRINIVIL,ZESTRIL) 40 MG tablet Take 1 tablet by mouth once daily 90 tablet 3    mupirocin (BACTROBAN) 2 % ointment Apply topically 3 (three) times daily. 15 g 0    sildenafiL (VIAGRA) 100 MG tablet Take 1 tablet (100 mg total) by mouth daily as needed for Erectile Dysfunction. Dispense generic 10 tablet 11     No current facility-administered medications for this visit.                          PHYSICAL EXAM:  BP (!) 152/83 (BP Location: Left arm, Patient Position: Sitting, BP Method: Large (Automatic))   Pulse 77   Ht 6' (1.829 m)   Wt 107.2 kg (236 lb 6.4 oz)   BMI 32.06 kg/m²   GENERAL: Normal development, well groomed.  HEENT:   HEENT:  Conjunctivae are non-erythematous; Pupils equal, round, and reactive to light; Nose is symmetrical; Nasal mucosa is pink and moist; Septum is midline; Inferior turbinates are normal; Nasal airflow is normal; Posterior pharynx is pink; Modified Mallampati:III; Posterior palate is low; Tonsils not visualized; Uvula is normal and pink;Tongue is normal;  Dentition is fair; No TMJ tenderness; Jaw opening and protrusion without click and without discomfort.  NECK: Supple. Neck circumference is 17.5 inches. No thyromegaly. No palpable nodes.     SKIN: On face and neck: No abrasions, no rashes, no lesions.  No subcutaneous nodules are palpable.  RESPIRATORY: Chest is clear to auscultation.  Normal chest expansion and non-labored breathing at rest.  CARDIOVASCULAR: Normal S1, S2.  No murmurs, gallops or rubs. No carotid bruits bilaterally.  No edema. No clubbing. No cyanosis.    NEURO: Oriented to time, place and person. Normal attention span and concentration. Gait normal.    PSYCH: Affect is full. Mood is normal  MUSCULOSKELETAL: Moves 4 extremities. Gait normal.           ASSESSMENT:    1. Sleep Apnea NEC. The patient symptomatically has  snoring, gasping for air, and excessive daytime sleepiness  with exam findings of crowded airway and elevated BMI. The patient has medical co-morbidities of hyperlipidemia and hypertension  which can be worsened by IRLANDA. This warrants further investigation for possible obstructive sleep apnea.              PLAN:    Diagnostic: Polysomnogram in lab. The nature of this procedure and its indication was discussed w  During our discussion today, we talked about the etiology of  IRLANDA as well as the potential ramifications of untreated sleep apnea, which could include daytime sleepiness, hypertension, heart disease and/or stroke.  We discussed potential treatment options, which could include weight loss, body positioning, continuous positive airway pressure (CPAP), or referral for surgical consideration. Meanwhile, he  is urged to avoid supine sleep, weight gain and alcoholic beverages since all of these can worsen IRLANDA.     The patient was given open opportunity to ask questions and/or express concerns about treatment plan. Two point patient identifier confirmed.       Precautions: The patient was advised to abstain from driving should he  feel sleepy or drowsy.    Follow up: MD after the sleep study has been completed.     ESS ([place] Sleepiness Scale) and other sleep medicine related questionnaires were reviewed with the patient.      46-minute visit. >50% spent counseling patient and coordination of care.  The patient was  cautioned against drowsy driving.

## 2023-12-27 NOTE — PATIENT INSTRUCTIONS
SLEEP LAB ([facility]) will contact you to schedulethe sleep study. Their number is [phone] (ext [phone]). Please call them if you do not hear from them in 2 weeks from now.  The [facility] Lab is located on 7th floor of the [facility] (for home and in lab studies); [place] lab is located in [facility] ( in lab studies only).    SLEEP CLINIC (my assistant) will call you when the sleep study results are ready or I will message you through the portal with the results as we have discussed - if you have not heard from us by 2 weeks from the date of the study, or you can [facility] to contact me.    Our clinic phone number is [phone] (ext 1)       You are advised to abstain from driving should you feel sleepy or drowsy.

## 2023-12-29 ENCOUNTER — TELEPHONE (OUTPATIENT)
Dept: SLEEP MEDICINE | Facility: OTHER | Age: 73
End: 2023-12-29
Payer: OTHER GOVERNMENT

## 2024-01-04 ENCOUNTER — TELEPHONE (OUTPATIENT)
Dept: GASTROENTEROLOGY | Facility: CLINIC | Age: 74
End: 2024-01-04
Payer: OTHER GOVERNMENT

## 2024-01-04 ENCOUNTER — HOSPITAL ENCOUNTER (OUTPATIENT)
Dept: SLEEP MEDICINE | Facility: HOSPITAL | Age: 74
Discharge: HOME | End: 2024-01-04
Attending: PSYCHIATRY & NEUROLOGY
Payer: MEDICARE

## 2024-01-04 DIAGNOSIS — G47.30 SLEEP APNEA, UNSPECIFIED TYPE: ICD-10-CM

## 2024-01-04 PROCEDURE — 95810 POLYSOM 6/> YRS 4/> PARAM: CPT

## 2024-01-04 NOTE — TELEPHONE ENCOUNTER
"----- Message from Anika Dumont sent at 1/4/2024  1:32 PM CST -----  Regarding: appt access  Contact: [phone]  Name Of Caller: self     Contact Preference?:  [phone]     What is the nature of the call?: pt would like to schedule an appt for incontinence but prefers a male MD     Additional Notes:    "Thank you for all that you do for our patients"        " Patient presents for a screening Mantoux Tuberculin Skin Test for employment.  Does not have a history of BCG Vaccine.  PPD 5TU 0.1ml placed on Left forearm, 12/6/2019 at 1125 AM.  Patient given instructions to return in 48 to 72 hours to have Skin Test read.    Documented by: Barbara Thompson, CNP

## 2024-01-05 DIAGNOSIS — G47.33 OSA (OBSTRUCTIVE SLEEP APNEA): Primary | ICD-10-CM

## 2024-01-05 PROBLEM — G47.30 SLEEP APNEA: Status: ACTIVE | Noted: 2024-01-05

## 2024-01-05 PROCEDURE — 95810 POLYSOM 6/> YRS 4/> PARAM: CPT | Mod: 26,,, | Performed by: INTERNAL MEDICINE

## 2024-01-05 NOTE — PROGRESS NOTES
Patient was educated about the purpose of the wires and what data was being collected. Patient was informed that the technician may need to enter the room during the night to fix leads or make adjustments to the equipment.                   Follow up instructions were provided to the patient

## 2024-01-05 NOTE — PROCEDURES
[facility]    Polysomnography Interpretation Report    Patient Name:  JEANNA BARNHART  MRN#:  [id number]  :  -  Study Date:  2024  Referring Provider:  ARTIE ALMANZA MD    Indications for Polysomnography:  The patient is a - old Male who is 6' and weighs 236.0 lbs.  His BMI equals 32.3.  [place] was - and Neck Circumference was -.  A full night polysomnogram was performed to evaluate for -.    Polysomnogram Data  A full night polysomnogram recorded the standard physiologic parameters including EEG, EOG, EMG, EKG, nasal and oral airflow.  Respiratory parameters of chest and abdominal movements were recorded with [place]-Crystal motion transducers.  Oxygen saturation was recorded by pulse oximetry.    Sleep Architecture  The total recording time of the polysomnogram was 424.4 minutes.  The total sleep time was 249.5 minutes.  The patient spent 22.2% of total sleep time in Stage N1, 48.1% in Stage N2, 11.4% in Stages N3, and 18.2% in REM.  Sleep latency was 43.7 minutes.  REM latency was 129.0 minutes.  Sleep Efficiency was 58.8%.  Wake after sleep onset was 131.0 minutes.    Respiratory Events  The polysomnogram revealed a presence of - obstructive, - central, and - mixed apneas resulting in a Total Apnea index of - events per hour.  There were 27 hypopneas resulting in a Total Hypopnea index of 6.5 events per hour.  The combined Apnea/Hypopnea index was 6.5 events per hour.  There were a total of 6 RERA events resulting in a Respiratory Disturbance Index (RDI) of 7.9 events per hour.     Mean oxygen saturation was 94.6%.  The lowest oxygen saturation during sleep was 88.0%.  Time spent ?88% oxygen saturation was 6.3 minutes (1.5%).    Limb Activity  There were 107 limb movements recorded.  Of this total, 107 were classified as PLMs.  Of the PLMs, 7 were associated with arousals.  The Limb Movement index was 25.7 per hour while the PLM index was 25.7 per hour and PLM with arousals index was 1.7  per hour.    Motor:    During stage REM sleep, excessive muscle activity (REM-sleep without atonia) was not observed.    No parasomnia activity was noted during the study.    Cardiac:  single lead EKG revealed normal sinus rhythm     Oxygenation:  There was no significant hypoxemia observed apart from periods that appeared to be related to artifact.    Impression:  -obstructive sleep apnea (REM-predominant with RAHI = 24 versus NRAHI = 2)  -No parasomnia activity or elevated muscle tone in stage REM was evident  -Excessive fragmentary myoclonus was present  -Frequent periodic limb movements which appeared to be related to obstructive events      Recommendations:    -treatment for IRLANDA seen in this study should be considered  -the patient has follow up with Sleep Medicine          Jesse Mcrae MD    (This Sleep Study was interpreted by a Board Certified Sleep Specialist who conducted an epoch-by-epoch review of the entire raw data recording.)  (The indication for this sleep study was reviewed and deemed appropriate by AASM Practice Parameters or other reasons by a Board Certified Sleep Specialist.)    [facility]    Diagnostic PSG Report    Patient Name: JEANNA BARNHART Study Date: 1/4/2024   Date of Birth: - MRN #: [id number]   Age: - HEMANTH #: -   Sex: Male Referring Provider: ARTIE ALMANZA MD   Height: 6' Recording Tech: Wei Mar RRT RPSGT   Weight: 236.0 lbs Scoring Tech: Alberto Gunderson RRT RPSGT   BMI: 32.3 Interpreting Physician: -   ESS: - Neck Circumference: -     Study Overview    Lights Off: 10:07:31 PM  Count Index   Lights On: 05:11:57 AM Awakenings: 30 7.2   Time in Bed: 424.4 min. Arousals: 64 15.4   Total Sleep Time: 249.5 min. Apneas & Hypopneas: 27 6.5    Sleep Efficiency: 58.8% Limb Movements: 107 25.7   Sleep Latency: 43.7 min. Snores: - -   Wake After Sleep Onset: 131.0 min. Desaturations: 29 7.0    REM Latency from Sleep Onset: 129.0 min. Minimum SpO2 TST: 88.0%        Sleep  Architecture   % of Time in Bed  Stages Time (mins) % Sleep Time   Wake 175.5    Stage N1 55.5 22.2%   Stage N2 120.0 48.1%   Stage N3 28.5 11.4%   REM 45.5 18.2%         Arousal Summary     NREM REM Sleep Index   Respiratory Arousals 5 10 15 3.6   PLM Arousals 7 - 7 1.7   Isolated Limb Movement Arousals - - - -   Spontaneous Arousals 32 10 42 10.1   Total 44 20 64 15.4       Limb Movement Summary     Count Index   Isolated Limb Movements - -   Periodic Limb Movements (PLMs) 107 25.7   Total Limb Movements 107 25.7         Respiratory Summary     By Sleep Stage By Body Position Total    NREM REM Supine Non-Supine    Time (min) 204.0 45.5 30.0 219.5 249.5           Obstructive Apnea - - - - -   Mixed Apnea - - - - -   Central Apnea - - - - -   Central Apnea Index - - - - -   Total Apneas - - - - -   Total Apnea Index - - - - -           Hypopnea 9 18 - 27 27   Hypopnea Index 2.6 23.7 - 7.4 6.5           Apnea & Hypopnea 9 18 - 27 27   Apnea & Hypopnea Index 2.6 23.7 - 7.4 6.5           RERAs 4 2 2 4 6   RERA Index 1.2 2.6 4.0 1.1 1.4           RDI 3.8 26.4 4.0 8.5 7.9     Scoring Criteria: Hypopneas scored at 3% desaturation criteria.    Respiratory Event Durations     Apnea Hypopnea    NREM REM NREM REM   Average (seconds) - - 23.8 25.2   Maximum (seconds) - - 45.1 38.4       Oxygen Saturation Summary     Wake NREM REM TST TIB   Average SpO2 94.8% 94.2% 95.6% 94.4% 94.6%   Minimum SpO2 84.0% 88.0% 88.0% 88.0% 84.0%   Maximum SpO2 100.0% 99.0% 99.0% 99.0% 100.0%       Oxygen Saturation Distribution    Range (%) Time in range (min) Time in range (%)   90.0 - 100.0 405.0 95.4%   80.0 - 90.0 15.8 3.7%   70.0 - 80.0 - -   60.0 - 70.0 - -   50.0 - 60.0 - -   0.0 - 50.0 - -   Time Spent ?88% SpO2    Range (%) Time in range (min) Time in range (%)   0.0 - 88.0 6.3 1.5%          Count Index   Desaturations 29 7.0      Cardiac Summary     Wake NREM REM Sleep Total   Average Pulse Rate (BPM) 75.8 69.6 66.3 69.0 71.8   Minimum  Pulse Rate (BPM) 59.0 57.0 57.0 57.0 57.0   Maximum Pulse Rate (BPM) 105.0 99.0 91.0 99.0 105.0     Pulse Rate Distribution    Range (bpm) Time in range (min) Time in range (%)   0.0 - 40.0 - -   40.0 - 60.0 13.4 3.1%   60.0 - 80.0 356.0 83.8%   80.0 - 100.0 54.9 12.9%   100.0 - 120.0 0.4 0.1%   120.0 - 140.0 - -   140.0 - 200.0 - -     EtCO2 Summary    Stage Min (mmHg) Average (mmHg) Max (mmHg)   Wake - - -   NREM(1+2+3) - - -   REM - - -     Range (mmHg) Time in range (min) Time in range (%)   20.0 - 40.0 - -   40.0 - 50.0 - -   50.0 - 55.0 - -   55.0 - 100.0 - -   Excluded data <20.0 & >65.0 425.0 100.0%     TcCO2 Summary    Stage Min (mmHg) Average (mmHg) Max (mmHg)   Wake - - -   NREM(1+2+3) - - -   REM - - -     Range (mmHg) Time in range (min) Time in range (%)   - - -   - - -   - - -   - - -   - - -     Comments    -

## 2024-01-05 NOTE — PROGRESS NOTES
Notified of mild REM predominant IRLANDA, autopap ordered. Discussed treating and filing claim with va for IRLANDA and if denied consider provet legal

## 2024-01-12 ENCOUNTER — OFFICE VISIT (OUTPATIENT)
Dept: GASTROENTEROLOGY | Facility: CLINIC | Age: 74
End: 2024-01-12
Payer: MEDICARE

## 2024-01-12 VITALS
BODY MASS INDEX: 31.92 KG/M2 | HEIGHT: 72 IN | HEART RATE: 83 BPM | WEIGHT: 235.69 LBS | SYSTOLIC BLOOD PRESSURE: 125 MMHG | DIASTOLIC BLOOD PRESSURE: 74 MMHG

## 2024-01-12 DIAGNOSIS — F10.19 ALCOHOL ABUSE WITH ALCOHOL-INDUCED DISORDER: ICD-10-CM

## 2024-01-12 DIAGNOSIS — R19.5 LOOSE STOOLS: ICD-10-CM

## 2024-01-12 DIAGNOSIS — R15.9 INCONTINENCE OF FECES, UNSPECIFIED FECAL INCONTINENCE TYPE: Primary | ICD-10-CM

## 2024-01-12 PROCEDURE — 99213 OFFICE O/P EST LOW 20 MIN: CPT | Mod: PBBFAC | Performed by: STUDENT IN AN ORGANIZED HEALTH CARE EDUCATION/TRAINING PROGRAM

## 2024-01-12 PROCEDURE — 99999 PR PBB SHADOW E&M-EST. PATIENT-LVL III: CPT | Mod: PBBFAC,,, | Performed by: STUDENT IN AN ORGANIZED HEALTH CARE EDUCATION/TRAINING PROGRAM

## 2024-01-12 PROCEDURE — 99204 OFFICE O/P NEW MOD 45 MIN: CPT | Mod: S$PBB,,, | Performed by: STUDENT IN AN ORGANIZED HEALTH CARE EDUCATION/TRAINING PROGRAM

## 2024-01-12 NOTE — PROGRESS NOTES
[facility] Consultation Note    Reason for Consult:  The primary encounter diagnosis was Incontinence of feces, unspecified fecal incontinence type. Diagnoses of Loose stools and Alcohol abuse with alcohol-induced disorder were also pertinent to this visit.    PCP:   Taj De La Vega       Referring MD:  No referring provider defined for this encounter.    HPI:  This is a 73 y.o. male here for evaluation of incontinence.    Today, he reports that he is having seepage/leakage of stool daily.  He is not incontinent of formed stool, but notes daily that he has moisture or wetness in his undergarment.    His stool consistency is loose or broken up.  Denies liquid diarrhea.  Stools are occasionally formed and solid.    He did recently start magnesium.  The skin abnormality was thought to be due to protein calorie malnutrition and B-12 deficiency.  Though, B-12 levels were normal.  He was started on Mag supplements though.  He is not entirely sure if his symptoms correlate with that.    Denies any emesis or nausea.      Regarding his alcohol use, he drinks roughly 5 beers daily.  He did switch to diet beer recently.        Colonoscopy was done in July 2021 and notable for an 11mm AC polyp (TA) and 2 smaller hyperplastic polyps in the DC.    He is not taking a fiber.      Objective Findings:    Vital Signs:  /74   Pulse 83   Ht 6' (1.829 m)   Wt 106.9 kg (235 lb 10.8 oz)   BMI 31.96 kg/m²   Body mass index is 31.96 kg/m².    Physical Exam:  General Appearance: Well appearing in no acute distress  Abdomen: Soft, non tender, non distended with positive bowel sounds in all four quadrants. No hepatosplenomegaly, ascites, or mass      Assessment:  1. Incontinence of feces, unspecified fecal incontinence type    2. Loose stools    3. Alcohol abuse with alcohol-induced disorder      I suspect his incontinence is multifactorial with a component due to his magnesium use in addition to his  alcohol/dietary consumption.  I have recommended he stop his magnesium and decrease alcohol consumption as able.  We will also start an OTC fiber supplement to promote stool bulking.    Additionally, I have recommended we check a fecal elastase to rule out EPI.    Pending his symptoms after conservative therapy, we will consider an anorectal manometry.  He will follow-up in 2 months to reassess.    Follow up in about 2 months (around 3/12/2024).      Order summary:         Thank you so much for allowing me to participate in the care of Riky Garcia MD

## 2024-01-22 ENCOUNTER — OFFICE VISIT (OUTPATIENT)
Dept: CARDIOLOGY | Facility: CLINIC | Age: 74
End: 2024-01-22
Payer: MEDICARE

## 2024-01-22 VITALS
DIASTOLIC BLOOD PRESSURE: 74 MMHG | SYSTOLIC BLOOD PRESSURE: 117 MMHG | HEIGHT: 72 IN | WEIGHT: 239 LBS | HEART RATE: 82 BPM | BODY MASS INDEX: 32.37 KG/M2

## 2024-01-22 DIAGNOSIS — I83.90 VARICOSE VEINS OF CALF: ICD-10-CM

## 2024-01-22 DIAGNOSIS — E78.00 HYPERCHOLESTEREMIA: ICD-10-CM

## 2024-01-22 DIAGNOSIS — R25.2 LEG CRAMPING: Primary | ICD-10-CM

## 2024-01-22 DIAGNOSIS — I10 PRIMARY HYPERTENSION: ICD-10-CM

## 2024-01-22 DIAGNOSIS — M79.605 PAIN OF LEFT LOWER EXTREMITY: ICD-10-CM

## 2024-01-22 DIAGNOSIS — F10.19 ALCOHOL ABUSE WITH ALCOHOL-INDUCED DISORDER: Chronic | ICD-10-CM

## 2024-01-22 PROBLEM — R00.2 PALPITATIONS: Status: RESOLVED | Noted: 2019-10-17 | Resolved: 2024-01-22

## 2024-01-22 PROCEDURE — 99214 OFFICE O/P EST MOD 30 MIN: CPT | Mod: S$PBB,,, | Performed by: INTERNAL MEDICINE

## 2024-01-22 PROCEDURE — 99999 PR PBB SHADOW E&M-EST. PATIENT-LVL III: CPT | Mod: PBBFAC,,, | Performed by: INTERNAL MEDICINE

## 2024-01-22 PROCEDURE — 99213 OFFICE O/P EST LOW 20 MIN: CPT | Mod: PBBFAC | Performed by: INTERNAL MEDICINE
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
## 2024-01-26 ENCOUNTER — HOSPITAL ENCOUNTER (OUTPATIENT)
Dept: CARDIOLOGY | Facility: HOSPITAL | Age: 74
Discharge: HOME | End: 2024-01-26
Attending: INTERNAL MEDICINE
Payer: MEDICARE

## 2024-01-26 DIAGNOSIS — R25.2 LEG CRAMPING: ICD-10-CM

## 2024-01-26 DIAGNOSIS — M79.605 PAIN OF LEFT LOWER EXTREMITY: ICD-10-CM

## 2024-01-26 DIAGNOSIS — I83.90 VARICOSE VEINS OF CALF: ICD-10-CM

## 2024-01-26 LAB
LEFT GREAT SAPHENOUS DISTAL THIGH DIA: 0.13 CM
LEFT GREAT SAPHENOUS JUNCTION DIA: 0.41 CM
LEFT GREAT SAPHENOUS JUNCTION REFLUX: 3052 MS
LEFT GREAT SAPHENOUS KNEE DIA: 0.11 CM
LEFT GREAT SAPHENOUS MIDDLE THIGH DIA: 0.41 CM
LEFT GREAT SAPHENOUS MIDDLE THIGH REFLUX: 3920 MS
LEFT GREAT SAPHENOUS PROXIMAL CALF DIA: 0.2 CM
LEFT SMALL SAPHENOUS SPJ DIA: 0.36 CM
LEFT SMALL SAPHENOUS SPJ REFLUX: 1926 MS
RIGHT GREAT SAPHENOUS DISTAL THIGH DIA: 0.26 CM
RIGHT GREAT SAPHENOUS JUNCTION DIA: 0.71 CM
RIGHT GREAT SAPHENOUS KNEE DIA: 0.2 CM
RIGHT GREAT SAPHENOUS KNEE REFLUX: 3418 MS
RIGHT GREAT SAPHENOUS MIDDLE THIGH DIA: 0.27 CM
RIGHT GREAT SAPHENOUS PROXIMAL CALF DIA: 0.24 CM
RIGHT GREAT SAPHENOUS PROXIMAL CALF REFLUX: 2040 MS
RIGHT SMALL SAPHENOUS SPJ DIA: 0.28 CM

## 2024-01-26 PROCEDURE — 93970 EXTREMITY STUDY: CPT | Mod: 26,,, | Performed by: INTERNAL MEDICINE

## 2024-01-26 PROCEDURE — 93970 EXTREMITY STUDY: CPT | Mod: TC

## 2024-02-05 ENCOUNTER — OFFICE VISIT (OUTPATIENT)
Dept: PODIATRY | Facility: CLINIC | Age: 74
End: 2024-02-05
Payer: MEDICARE

## 2024-02-05 VITALS
DIASTOLIC BLOOD PRESSURE: 82 MMHG | WEIGHT: 238 LBS | BODY MASS INDEX: 32.23 KG/M2 | SYSTOLIC BLOOD PRESSURE: 131 MMHG | HEART RATE: 71 BPM | HEIGHT: 72 IN

## 2024-02-05 DIAGNOSIS — I87.2 VENOUS INSUFFICIENCY: Primary | ICD-10-CM

## 2024-02-05 DIAGNOSIS — G47.33 OSA (OBSTRUCTIVE SLEEP APNEA): Primary | ICD-10-CM

## 2024-02-05 DIAGNOSIS — M25.572 CHRONIC PAIN OF LEFT ANKLE: ICD-10-CM

## 2024-02-05 DIAGNOSIS — G89.29 CHRONIC PAIN OF LEFT ANKLE: ICD-10-CM

## 2024-02-05 PROCEDURE — 99203 OFFICE O/P NEW LOW 30 MIN: CPT | Mod: S$PBB,,, | Performed by: PODIATRIST

## 2024-02-05 PROCEDURE — 99213 OFFICE O/P EST LOW 20 MIN: CPT | Mod: PBBFAC,PN | Performed by: PODIATRIST

## 2024-02-05 PROCEDURE — 99999 PR PBB SHADOW E&M-EST. PATIENT-LVL III: CPT | Mod: PBBFAC,,, | Performed by: PODIATRIST

## 2024-02-05 NOTE — PROGRESS NOTES
PODIATRY    PATIENT ID:  Riky Salcedo is a 73 y.o. male.     CHIEF CONCERN:   Ankle Pain (Swollen and tenderness in left ankle with limited movement)         MEDICAL DECISION MAKING:      Problem List Items Addressed This Visit    None  Visit Diagnoses       Venous insufficiency    -  Primary    Relevant Orders    COMPRESSION STOCKINGS    Chronic pain of left ankle        Relevant Orders    X-Ray Ankle Complete Left            I counseled the patient on the patient's conditions, their implications and medical management.    Xrays ordered  Recommend compressions stockings.  He has but doesn't wear cause feel that they are too tight.  I applied a Tubi  size D today to try.  A prescription for compression stockings (Knee High) provide.  No recent acute injury otherwise.    Shoe and activity modification as needed for relief.   Elevate feet when resting.           HISTORY OF PRESENT ILLNESS:  Riky Salcedo is a 73 y.o. male with concerns regarding: chronic left ankle pain, hurts when foot is slightly dorsiflexed, present a few months.  Did a venous ultrasound recently.  No DVT, just reflux.  He is being followed by cardiology.  He reports history of hospitalization for cellulitis many years ago when he was in the .   Trauma/injury to the area:  none recently.          Patient Active Problem List   Diagnosis    Hypertension    HSV-1 (herpes simplex virus 1) infection    Male hypogonadism    Peyronie's disease    BPH with urinary obstruction    Lipoma of back    Alcohol abuse with alcohol-induced disorder    Hypercholesteremia    Poor diet    Class 1 obesity due to excess calories without serious comorbidity with body mass index (BMI) of 32.0 to 32.9 in adult    Left arm pain    Chest discomfort    Lung granuloma    Erectile dysfunction due to arterial insufficiency    Adverse effect of statin    Sleep apnea       Current Outpatient Medications on File Prior to Visit   Medication Sig Dispense Refill     ivermectin (SOOLANTRA) 1 % Crea APPLY TOPICALLY TO THE AFFECTED AREAS 2 TIMES DAILY. 45 g 2    lisinopriL (PRINIVIL,ZESTRIL) 40 MG tablet Take 1 tablet by mouth once daily 90 tablet 3    sildenafiL (VIAGRA) 100 MG tablet Take 1 tablet (100 mg total) by mouth daily as needed for Erectile Dysfunction. Dispense generic 10 tablet 11     No current facility-administered medications on file prior to visit.       Review of patient's allergies indicates:   Allergen Reactions    Clindamycin Itching    Statins-hmg-coa reductase inhibitors Palpitations         ROS:   General ROS: negative for - chills, fever or night sweats  Respiratory ROS: no cough, shortness of breath, or wheezing  Cardiovascular ROS: no chest pain or dyspnea on exertion      EXAM:     Vitals:    02/05/24 0818   BP: 131/82   Pulse: 71   Weight: 108 kg (238 lb)   Height: 6' (1.829 m)        General:  Alert and Oriented x 3;  No acute distress    Vascular:   Dorsalis Pedis:  present   Posterior Tibial:  present  Capillary refill time:  3 seconds  Temperature of toes warm to touch  Edema:  1+ and pitting       Neurological:     Sharp touch:  normal  Light touch: normal  Tinels Sign:  Absent  Mulders Click:   Absent      Dermatological:   Skin: warm, moist, and appropriate for age  Wounds/Ulcers:  Absent  Bruising:  Absent  Erythema:  Absent  Varicose veins present.    No cellulitis.       Musculoskeletal:   Metatarsophalangeal range of motion:   diminished range of motion  Subtalar joint range of motion: diminished range of motion  Ankle joint range of motion:  diminished range of motion  5/5 muscle strength         Pending xrays.

## 2024-02-06 NOTE — PROGRESS NOTES
joelle Tomlin, Usha Ortiz, Sherly JENSEN NP  Pt wanted a FFM due to mouth breathing. Can please send me updated Rx?

## 2024-02-16 ENCOUNTER — HOSPITAL ENCOUNTER (OUTPATIENT)
Dept: RADIOLOGY | Facility: HOSPITAL | Age: 74
Discharge: HOME | End: 2024-02-16
Attending: PODIATRIST
Payer: MEDICARE

## 2024-02-16 DIAGNOSIS — M25.572 CHRONIC PAIN OF LEFT ANKLE: ICD-10-CM

## 2024-02-16 DIAGNOSIS — G89.29 CHRONIC PAIN OF LEFT ANKLE: ICD-10-CM

## 2024-02-16 PROCEDURE — 73610 X-RAY EXAM OF ANKLE: CPT | Mod: 26,LT,, | Performed by: RADIOLOGY

## 2024-02-16 PROCEDURE — 73610 X-RAY EXAM OF ANKLE: CPT | Mod: TC,LT

## 2024-02-27 DIAGNOSIS — Z00.00 ENCOUNTER FOR MEDICARE ANNUAL WELLNESS EXAM: ICD-10-CM

## 2024-02-28 ENCOUNTER — OFFICE VISIT (OUTPATIENT)
Dept: INTERNAL MEDICINE | Facility: CLINIC | Age: 74
End: 2024-02-28
Payer: MEDICARE

## 2024-02-28 VITALS
OXYGEN SATURATION: 97 % | HEART RATE: 84 BPM | SYSTOLIC BLOOD PRESSURE: 136 MMHG | HEIGHT: 72 IN | BODY MASS INDEX: 31.71 KG/M2 | DIASTOLIC BLOOD PRESSURE: 62 MMHG | RESPIRATION RATE: 19 BRPM | TEMPERATURE: 97 F | WEIGHT: 234.13 LBS

## 2024-02-28 DIAGNOSIS — J34.2 DEVIATED SEPTUM: ICD-10-CM

## 2024-02-28 DIAGNOSIS — J84.10 LUNG GRANULOMA: ICD-10-CM

## 2024-02-28 DIAGNOSIS — I70.0 AORTIC ATHEROSCLEROSIS: ICD-10-CM

## 2024-02-28 DIAGNOSIS — J30.9 ALLERGIC RHINITIS, UNSPECIFIED SEASONALITY, UNSPECIFIED TRIGGER: Primary | ICD-10-CM

## 2024-02-28 PROCEDURE — 99214 OFFICE O/P EST MOD 30 MIN: CPT | Mod: PBBFAC,PO | Performed by: FAMILY MEDICINE

## 2024-02-28 PROCEDURE — 99999 PR PBB SHADOW E&M-EST. PATIENT-LVL IV: CPT | Mod: PBBFAC,,, | Performed by: FAMILY MEDICINE

## 2024-02-28 PROCEDURE — 99214 OFFICE O/P EST MOD 30 MIN: CPT | Mod: S$PBB,,, | Performed by: FAMILY MEDICINE

## 2024-02-28 RX ORDER — FLUTICASONE PROPIONATE 50 MCG
2 SPRAY, SUSPENSION (ML) NASAL DAILY
Qty: 16 G | Refills: 11 | Status: SHIPPED | OUTPATIENT
Start: 2024-02-28 | End: 2024-05-20

## 2024-02-28 NOTE — PROGRESS NOTES
Subjective     Patient ID: Riky Salcedo is a 73 y.o. male.    Chief Complaint: Cough (X15 days) and Nasal Congestion  73-year-old white male patient of Dr. De La Vega but new patient to me with aortic atherosclerosis and lung granuloma presents to clinic today secondary to a complaint of continued nasal congestion, postnasal drip, runny nose, ringing in the ears, watery eyes, and cough productive of yellow sputum for the past 15 days.  He has been using Mucinex and Claritin with mild relief.  He does note recently being started on CPAP secondary to sleep apnea and reports that he has had substantial difficulty tolerating the CPAP secondary to continued worsening nasal congestion.  Fever   Associated symptoms include congestion and coughing (Yellowish sputum). Pertinent negatives include no abdominal pain, chest pain, diarrhea, ear pain, headaches, nausea, rash, sore throat, urinary pain or vomiting.   Cough  Associated symptoms include postnasal drip and rhinorrhea. Pertinent negatives include no chest pain, chills, ear pain, eye redness, fever, headaches, myalgias, rash, sore throat or shortness of breath.     Review of Systems   Constitutional:  Negative for appetite change, chills, fatigue and fever.   HENT:  Positive for nasal congestion, postnasal drip, rhinorrhea and tinnitus. Negative for ear pain, hearing loss, sinus pressure/congestion and sore throat.    Eyes:  Positive for discharge (watery). Negative for redness, itching and visual disturbance.   Respiratory:  Positive for cough (Yellowish sputum). Negative for chest tightness and shortness of breath.    Cardiovascular:  Negative for chest pain and palpitations.   Gastrointestinal:  Negative for abdominal pain, constipation, diarrhea, nausea and vomiting.   Genitourinary:  Negative for decreased urine volume, difficulty urinating, dysuria, frequency, hematuria and urgency.   Musculoskeletal:  Negative for back pain, myalgias, neck pain and neck  stiffness.   Integumentary:  Negative for rash.   Neurological:  Negative for dizziness, light-headedness and headaches.   Psychiatric/Behavioral: Negative.            Objective     Physical Exam  Vitals and nursing note reviewed.   Constitutional:       General: He is not in acute distress.     Appearance: Normal appearance. He is well-developed. He is not diaphoretic.   HENT:      Head: Normocephalic and atraumatic.      Right Ear: External ear normal.      Left Ear: External ear normal.      Nose: Septal deviation (Mild right-sided septal deviation) present.      Right Turbinates: Swollen.      Left Turbinates: Swollen.      Mouth/Throat:      Pharynx: No oropharyngeal exudate.   Eyes:      General: No scleral icterus.        Right eye: No discharge.         Left eye: No discharge.      Conjunctiva/sclera: Conjunctivae normal.      Pupils: Pupils are equal, round, and reactive to light.   Neck:      Thyroid: No thyromegaly.      Vascular: No JVD.      Trachea: No tracheal deviation.   Cardiovascular:      Rate and Rhythm: Normal rate and regular rhythm.      Heart sounds: Normal heart sounds. No murmur heard.     No friction rub. No gallop.   Pulmonary:      Effort: Pulmonary effort is normal. No respiratory distress.      Breath sounds: Normal breath sounds. No stridor. No wheezing, rhonchi or rales.   Chest:      Chest wall: No tenderness.   Abdominal:      General: Bowel sounds are normal. There is no distension.      Palpations: Abdomen is soft. There is no mass.      Tenderness: There is no abdominal tenderness. There is no guarding or rebound.   Musculoskeletal:         General: No tenderness. Normal range of motion.      Cervical back: Normal range of motion and neck supple.   Lymphadenopathy:      Cervical: No cervical adenopathy.   Skin:     General: Skin is warm and dry.      Coloration: Skin is not pale.      Findings: No erythema or rash.   Neurological:      Mental Status: He is alert and oriented to  person, place, and time.   Psychiatric:         Mood and Affect: Mood normal.         Behavior: Behavior normal.         Thought Content: Thought content normal.         Judgment: Judgment normal.            Assessment and Plan     1. Allergic rhinitis, unspecified seasonality, unspecified trigger  -     fluticasone propionate (FLONASE) 50 mcg/actuation nasal spray; 2 sprays (100 mcg total) by Each Nostril route once daily.  Dispense: 16 g; Refill: 11    2. Deviated septum  -     Ambulatory referral/consult to ENT; Future; Expected date: 03/06/2024    3. Aortic atherosclerosis    4. Lung granuloma        1. I recommended the patient start Flonase nasal spray 2 sprays per nostril daily and continue use of Claritin for symptoms of congestion and drip.    2. The patient does have a mild deviated septum; therefore, I have referred him to ENT for further evaluation and treatment recommendations.    3. Aortic atherosclerosis and calcified lung granuloma have been noted on CT calcium score and remains stable.    4. Return to clinic as needed if symptoms persist or worsen.             Follow up if symptoms worsen or fail to improve.

## 2024-03-05 ENCOUNTER — OFFICE VISIT (OUTPATIENT)
Dept: SLEEP MEDICINE | Facility: CLINIC | Age: 74
End: 2024-03-05
Attending: PSYCHIATRY & NEUROLOGY
Payer: MEDICARE

## 2024-03-05 VITALS
DIASTOLIC BLOOD PRESSURE: 76 MMHG | SYSTOLIC BLOOD PRESSURE: 127 MMHG | HEIGHT: 72 IN | WEIGHT: 235.69 LBS | BODY MASS INDEX: 31.92 KG/M2 | HEART RATE: 87 BPM

## 2024-03-05 DIAGNOSIS — G47.33 OSA (OBSTRUCTIVE SLEEP APNEA): Primary | ICD-10-CM

## 2024-03-05 PROCEDURE — 99212 OFFICE O/P EST SF 10 MIN: CPT | Mod: PBBFAC | Performed by: PSYCHIATRY & NEUROLOGY

## 2024-03-05 PROCEDURE — 99999 PR PBB SHADOW E&M-EST. PATIENT-LVL II: CPT | Mod: PBBFAC,,, | Performed by: PSYCHIATRY & NEUROLOGY

## 2024-03-05 PROCEDURE — 99215 OFFICE O/P EST HI 40 MIN: CPT | Mod: S$PBB,,, | Performed by: PSYCHIATRY & NEUROLOGY

## 2024-03-05 NOTE — PROGRESS NOTES
Decreased APAP frx from 12 - to 10    Riky Salcedo is a 73 y.o. male is here to be evaluated for a sleep disorder; referred by No ref. provider found.    Had a sleep study and got APAP since his last visit  Difficuty breathing with CPAP due to exacerbation of allergy (CPAP makes his throat to tickle throat)  + tinnitus make it hard.  Has 2 more months to meet  compliance.       Tried a full face mask (triangle); second one - P30; F30 I yet to try it.    Very worried that APAP may aggravate tinnitus    Just got full face mask (under the nose) and higher humidity   Resmed 11  Compliance Report Compliance Payor Standard Usage 02/04/2024 - 03/04/2024 Usage days 16/30 days (53%) >= 4 hours 10 days (33%) < 4 hours 6 days (20%) Usage hours 73 hours 42 minutes Average usage (total days) 2 hours 27 minutes Average usage (days used) 4 hours 36 minutes Median usage (days used) 6 hours 0 minutes Total used hours (value since last reset - 03/04/2024) 73 hours AirSense 11 AutoSet Serial number [id number] Mode AutoSet Min Pressure 5 cmH2O Max Pressure 12 cmH2O EPR Fulltime EPR level 3 Response Standard Therapy Pressure - cmH2O Median: 5.0 95th percentile: 6.7 Maximum: 7.1 Leaks - L/min Median: 50.2 95th percentile: 83.7 Maximum: 99.7 Events per hour AI: 4.1 HI: 0.2 AHI: 4.3 Apnea Index Central: 0.2 Obstructive: 0.2 Unknown: 3.7 RERA Index 0.2 Cheyne-Stevenson respiration (average duration per night) 0 minutes (0%)    Yet has to meet compinace    + nasal congewstion - Claritin, Flonawse, seeing ENT tomorrow.    + has deviated septus.    Medications pertinent to sleep  disorders taken currently: -  Previous  medications taken  for sleep disorders:  -    Sleep studies  PSG study  In 12/24: There were 27 hypopneas resulting in a Total Hypopnea index of 6.5 events per hour.  The combined Apnea/Hypopnea index was 6.5 events per hour.  There were a total of 6 RERA events resulting in a Respiratory Disturbance Index (RDI) of 7.9  events per hour.      Mean oxygen saturation was 94.6%.  The lowest oxygen saturation during sleep was 88.0%.  Time spent ?88% oxygen saturation was 6.3 minutes (1.5%).            3/5/2024    10:56 AM   EPWORTH SLEEPINESS SCALE TOTAL SCORE    Total score 7             3/5/2024    10:56 AM   EPWORTH SLEEPINESS SCALE   Sitting and reading 2   Watching TV 0   Sitting, inactive in a public place (e.g. a theatre or a meeting) 0   As a passenger in a car for an hour without a break 2   Lying down to rest in the afternoon when circumstances permit 1   Sitting and talking to someone 0   Sitting quietly after a lunch without alcohol 2   In a car, while stopped for a few minutes in traffic 0   Total score 7           3/5/2024    10:56 AM   EPWORTH SLEEPINESS SCALE   Sitting and reading 2   Watching TV 0   Sitting, inactive in a public place (e.g. a theatre or a meeting) 0   As a passenger in a car for an hour without a break 2   Lying down to rest in the afternoon when circumstances permit 1   Sitting and talking to someone 0   Sitting quietly after a lunch without alcohol 2   In a car, while stopped for a few minutes in traffic 0   Total score 7           DME:         PAST MEDICAL HISTORY:    Active Ambulatory Problems     Diagnosis Date Noted    Hypertension     HSV-1 (herpes simplex virus 1) infection     Male hypogonadism 02/28/2013    Peyronie's disease 02/28/2013    BPH with urinary obstruction 02/28/2013    Lipoma of back 10/31/2013    Alcohol abuse with alcohol-induced disorder 06/11/2014    Hypercholesteremia 06/26/2014    Poor diet 02/16/2018    Class 1 obesity due to excess calories without serious comorbidity with body mass index (BMI) of 32.0 to 32.9 in adult 02/16/2018    Left arm pain 10/17/2019    Chest discomfort 10/17/2019    Lung granuloma 10/28/2019    Erectile dysfunction due to arterial insufficiency 10/09/2023    Adverse effect of statin 11/20/2023    Sleep apnea 01/05/2024    Aortic atherosclerosis  02/28/2024     Resolved Ambulatory Problems     Diagnosis Date Noted    Low serum testosterone level     Erectile dysfunction 02/28/2013    Sebaceous cyst 10/31/2013    Infected sebaceous cyst 06/11/2014    Special screening for malignant neoplasms, colon 08/05/2016    Palpitations 10/17/2019    Encounter for screening colonoscopy 07/29/2021     Past Medical History:   Diagnosis Date    Cataract     Fever blister     Hyperlipidemia     Keloid cicatrix     Obese                 PAST SURGICAL HISTORY:    Past Surgical History:   Procedure Laterality Date    COLONOSCOPY N/A 8/5/2016    Procedure: COLONOSCOPY;  Surgeon: Denis Herzog MD;  Location: [facility] ([address] [place] [address]);  Service: Endoscopy;  Laterality: N/A;  has hemorrhoid that may need to be banded    COLONOSCOPY N/A 7/29/2021    Procedure: COLONOSCOPY;  Surgeon: Deejay Corley MD;  Location: [facility] ([address] [place] [address]);  Service: Endoscopy;  Laterality: N/A;  Pt. is fully vaccinated.EC    HERNIA REPAIR      LIPOMA RESECTION N/A 11/30/2022    Procedure: EXCISION, LIPOMA Back;  Surgeon: Clayton Madrid MD;  Location: [facility] OR [address] [place] [address];  Service: General;  Laterality: N/A;    UMBILICAL HERNIA REPAIR           FAMILY HISTORY:                Family History   Problem Relation Age of Onset    Heart disease Mother     Heart disease Father     Hypertension Father     Heart disease Paternal Grandmother     Hypertension Paternal Grandmother     Colon cancer Neg Hx     Crohn's disease Neg Hx     Ulcerative colitis Neg Hx     Melanoma Neg Hx     Prostate cancer Neg Hx     Kidney disease Neg Hx        SOCIAL HISTORY:          Tobacco:   Social History     Tobacco Use   Smoking Status Former    Types: Cigars   Smokeless Tobacco Former       alcohol use:    Social History     Substance and Sexual Activity   Alcohol Use Yes    Alcohol/week: 4.2 standard drinks of alcohol    Types: 5 Standard drinks or equivalent per week    Comment: 4-5 beers a day                   ALLERGIES:     Review of patient's allergies indicates:   Allergen Reactions    Atenolol Other (See Comments)    Clindamycin Itching and Hives    Statins-hmg-coa reductase inhibitors Palpitations       CURRENT MEDICATIONS:    Current Outpatient Medications   Medication Sig Dispense Refill    fluticasone propionate (FLONASE) 50 mcg/actuation nasal spray 2 sprays (100 mcg total) by Each Nostril route once daily. 16 g 11    ivermectin (SOOLANTRA) 1 % Crea APPLY TOPICALLY TO THE AFFECTED AREAS 2 TIMES DAILY. 45 g 2    lisinopriL (PRINIVIL,ZESTRIL) 40 MG tablet Take 1 tablet by mouth once daily 90 tablet 3    sildenafiL (VIAGRA) 100 MG tablet Take 1 tablet (100 mg total) by mouth daily as needed for Erectile Dysfunction. Dispense generic 10 tablet 11     No current facility-administered medications for this visit.                        PHYSICAL EXAM:  /76 (BP Location: Left arm, Patient Position: Sitting, BP Method: Large (Automatic))   Pulse 87   Ht 6' (1.829 m)   Wt 106.9 kg (235 lb 11.2 oz)   BMI 31.97 kg/m²   GENERAL: Normal development, well groomed.  HEENT:   HEENT:  Conjunctivae are non-erythematous; Pupils equal, round, and reactive to light; Nose is symmetrical; Nasal mucosa is pink and moist; Septum is midline; Inferior turbinates are normal; Nasal airflow is normal; Posterior pharynx is pink; Modified Mallampati:III-IV; Posterior palate is normal; Tonsils not visualized; Uvula is normal and pink;Tongue is enlarged; Dentition is fair; No TMJ tenderness; Jaw opening and protrusion without click and without discomfort.  NECK: Supple. Neck circumference is 17.5 inches. No thyromegaly. No palpable nodes.     SKIN: On face and neck: No abrasions, no rashes, no lesions.  No subcutaneous nodules are palpable.  RESPIRATORY: Chest is clear to auscultation.  Normal chest expansion and non-labored breathing at rest.  CARDIOVASCULAR: Normal S1, S2.  No murmurs, gallops or rubs. No carotid bruits bilaterally.  No edema. No  clubbing. No cyanosis.    NEURO: Oriented to time, place and person. Normal attention span and concentration. Gait normal.    PSYCH: Affect is full. Mood is normal  MUSCULOSKELETAL: Moves 4 extremities. Gait normal.           ASSESSMENT:    1. IRLANDA.         PLAN:    Continue APAP at current settings. Will reorder supplies.       During our discussion today, we talked about the etiology of  IRLANDA as well as the potential ramifications of untreated sleep apnea, which could include daytime sleepiness, hypertension, heart disease and/or stroke.  We discussed potential treatment options, which could include weight loss, body positioning, continuous positive airway pressure (CPAP), or referral for surgical consideration. Meanwhile, he  is urged to avoid supine sleep, weight gain and alcoholic beverages since all of these can worsen IRLANDA.     The patient was given open opportunity to ask questions and/or express concerns about treatment plan. Two point patient identifier confirmed.       Precautions: The patient was advised to abstain from driving should he feel sleepy or drowsy.    Follow up: MD after the sleep study has been completed.     ESS ([place] Sleepiness Scale) and other sleep medicine related questionnaires were reviewed with the patient.      46-minute visit. >50% spent counseling patient and coordination of care.  The patient was  cautioned against drowsy driving.

## 2024-03-06 ENCOUNTER — OFFICE VISIT (OUTPATIENT)
Dept: OTOLARYNGOLOGY | Facility: CLINIC | Age: 74
End: 2024-03-06
Payer: MEDICARE

## 2024-03-06 VITALS
SYSTOLIC BLOOD PRESSURE: 152 MMHG | HEART RATE: 85 BPM | WEIGHT: 236.31 LBS | DIASTOLIC BLOOD PRESSURE: 89 MMHG | BODY MASS INDEX: 32.05 KG/M2

## 2024-03-06 DIAGNOSIS — J34.3 NASAL TURBINATE HYPERTROPHY: Primary | ICD-10-CM

## 2024-03-06 DIAGNOSIS — J34.2 DEVIATED SEPTUM: ICD-10-CM

## 2024-03-06 DIAGNOSIS — M95.0 NASAL VALVE COLLAPSE: ICD-10-CM

## 2024-03-06 DIAGNOSIS — G47.33 OSA (OBSTRUCTIVE SLEEP APNEA): ICD-10-CM

## 2024-03-06 PROCEDURE — 99999 PR PBB SHADOW E&M-EST. PATIENT-LVL III: CPT | Mod: PBBFAC,,, | Performed by: OTOLARYNGOLOGY

## 2024-03-06 PROCEDURE — 99214 OFFICE O/P EST MOD 30 MIN: CPT | Mod: S$PBB,,, | Performed by: OTOLARYNGOLOGY

## 2024-03-06 PROCEDURE — 99213 OFFICE O/P EST LOW 20 MIN: CPT | Mod: PBBFAC | Performed by: OTOLARYNGOLOGY

## 2024-03-06 NOTE — PROGRESS NOTES
Mr. Salcedo     Vitals:    24 0939   BP: (!) 152/89   Pulse: 85       Chief Complaint:  Deviated septum       HPI:   is a 73-year-old white male who presents referred by  with diagnosis of IRLANDA and nasal obstruction.  He was diagnosed with a possible septal deviation.  He also complains of coughing after he eats and runny nose.  He was placed on Flonase nasal spray however he has only been on this for 2 days.  He has not been doing any saline sinus rinses.  He does have a significant history of tinnitus.    SNOT22- 49 NOSE- 40    Review of Systems:  Constitutional:   weight loss or weight gain: Negative  Allergy/Immunologic:   Negative  Nasal Congestion/Obstruction:   Positive  Nosebleeds:   Negative  Sinus infections:   Negative  Headache/Facial Pain:   Negative  Snoring/IRLANDA:   Positive for IRLANDA on CPAP however having difficulty obtaining a good mask fit and function.  Throat: Infections/Pain:   Negative, positive for cough  Hoarseness/Speech Disturbance:   Negative  Trauma Hx:  Negative    Cardiovascular:  M/I Angina: Negative  Hypertension:  Positive  Endocrine:    DM/Steroids: Negative  GI:   Dysphagia/Reflux: Negative  :   GYN Pregnancy: Negative  Renal:   Dialysis: Negative  Lymphatic:   Neck Mass/Lymphadenopathy: Negative  Muscoloskeletal:   Negative  Hematologic:   Bleeding Disorders/Anemia: Negative  Neurologic:    Cranial/Neuralgia: Negative  Pulmonary:   Asthma/SOB/Cough: Negative, positive history of lung granuloma  Skin Disorders: Negative    Past Medical/Surgical/Family/Social History:    ENT Surgery: Negative  Occupational Exposure: Negative   Problems: Negative  Cancer: Negative    Past Family History:   Family history of Cancer: Negative    Past Social History:   Tobacco: Nonsmoker   Alcohol:  10 beers weekly Drinker      Allergies and medications: Reviewed per med card.    Physical Examination:  Ears:   External auditory canals:  Clear   Hearing: Grossly intact.   He does wear hearing aids which he states are to help with his tinnitus.   Tympanic Membranes: Clear  Nose:   External:  Bilateral external valve collapse on moderate inspiration, positive Luis and modified Bottineau maneuver.   Intranasal:  Inferior septal spur to the right with septal deviation to the left and 2+ turbinates.  All of these deformities together creating 80% obstruction  Mouth:   Intraorally: Lips, teeth, and gums: Normal   Oropharynx: Normal   Mucosa: Normal   Tongue: Normal  Throat:      Palate:  Slightly elongated palate and uvula, Mallampati 1-2   Tonsils:  Absent   Posterior Pharynx: Normal  Fiberoptic exam: Not performed  Head/Face:     Inspection: Normal and atraumatic   Palpation/Percussion: Non tender   Facial strength: Normal and symmetric   Salivary glands: Normal  Neck: Supple  Thyroid: No masses  Lymphatics: No nodes  Respiratory:   Effort: Normal  Eyes:   Ocular Mobility: Normal   Vision: Grossly intact  Neuro/Psych:   Cranial Nerves: Grossly Intact   Orientation: Normal   Mood/Affect: Normal      Assessment/Plan:  I have discussed my findings with him in detail as well as my recommendations for treatment.  I have given him literature on saline sinus rinses including its use with distilled water only and described how this to be used in detail with him.  I have also encouraged him to continue with his Flonase nasal sprays daily and I have described how this is to be performed as well.  I have given him a reflux precaution sheet and reviewed all the recommendations with him in detail.  I have recommended that he trial breathe right strips.  We discussed that he could benefit from nasal reconstruction with auricular cartilage Batten grafts, septoplasty, submucous resection of turbinates if none of the above remedies alleviate his nasal airway obstruction.  He will return to clinic p.r.n.

## 2024-03-22 ENCOUNTER — TELEPHONE (OUTPATIENT)
Dept: GASTROENTEROLOGY | Facility: CLINIC | Age: 74
End: 2024-03-22
Payer: OTHER GOVERNMENT

## 2024-03-25 ENCOUNTER — OFFICE VISIT (OUTPATIENT)
Dept: GASTROENTEROLOGY | Facility: CLINIC | Age: 74
End: 2024-03-25
Payer: MEDICARE

## 2024-03-25 VITALS
SYSTOLIC BLOOD PRESSURE: 130 MMHG | HEART RATE: 92 BPM | HEIGHT: 73 IN | DIASTOLIC BLOOD PRESSURE: 78 MMHG | BODY MASS INDEX: 31.32 KG/M2 | WEIGHT: 236.31 LBS

## 2024-03-25 DIAGNOSIS — R19.5 LOOSE STOOLS: ICD-10-CM

## 2024-03-25 DIAGNOSIS — R15.9 INCONTINENCE OF FECES, UNSPECIFIED FECAL INCONTINENCE TYPE: Primary | ICD-10-CM

## 2024-03-25 PROCEDURE — 99213 OFFICE O/P EST LOW 20 MIN: CPT | Mod: PBBFAC | Performed by: STUDENT IN AN ORGANIZED HEALTH CARE EDUCATION/TRAINING PROGRAM

## 2024-03-25 PROCEDURE — 99214 OFFICE O/P EST MOD 30 MIN: CPT | Mod: S$PBB,,, | Performed by: STUDENT IN AN ORGANIZED HEALTH CARE EDUCATION/TRAINING PROGRAM

## 2024-03-25 PROCEDURE — 99999 PR PBB SHADOW E&M-EST. PATIENT-LVL III: CPT | Mod: PBBFAC,,, | Performed by: STUDENT IN AN ORGANIZED HEALTH CARE EDUCATION/TRAINING PROGRAM

## 2024-03-25 NOTE — PROGRESS NOTES
"    [facility] Follow-UP Note    Reason for Follow-Up:  The primary encounter diagnosis was Incontinence of feces, unspecified fecal incontinence type. A diagnosis of Loose stools was also pertinent to this visit.    PCP:   Taj De La Vega           HPI:  This is a 73 y.o. male last seen in GI clinic on January 12 for incontinence.      Interval History:  He notes since his last visit he has done well and denies any worsening of symptoms.  He is taking a generic fiber supplement and notes his bowel movements are increasing in firmness.  He feels that his stools are more formed and he is having less leaking.      Objective Findings:    Vital Signs:  /78   Pulse 92   Ht 6' 1" (1.854 m)   Wt 107.2 kg (236 lb 5.3 oz)   BMI 31.18 kg/m²   Body mass index is 31.18 kg/m².    Physical Exam:  General Appearance: Well appearing in no acute distress    Assessment:  1. Incontinence of feces, unspecified fecal incontinence type    2. Loose stools       In brief, the patient is a 74yo man who presented initially for loose stools and leakage.  He is now doing well with fiber supplementation.    He will follow-up as needed.    Of note, he is due for a surveillance colonoscopy.  Order was placed today.        No follow-ups on file.      Order summary:         Thank you so much for allowing me to participate in the care of Riky Garcia MD      "

## 2024-03-26 ENCOUNTER — TELEPHONE (OUTPATIENT)
Dept: ENDOSCOPY | Facility: HOSPITAL | Age: 74
End: 2024-03-26
Payer: OTHER GOVERNMENT

## 2024-03-26 NOTE — TELEPHONE ENCOUNTER
"----- Message from Devorah Manzo sent at 3/25/2024  3:06 PM CDT -----  Regarding: FW: Endo Case Request    ----- Message -----  From: Eriberto Garcia MD  Sent: 3/25/2024   2:54 PM CDT  To: [facility]  Subject: Endo Case Request                                Procedure: Colonoscopy    Diagnosis: Surveillance colonoscopy - Hx of colon polyps    Procedure Timin-12 weeks    #If within 4 weeks selected, please yamileth as high priority#    #If greater than 12 weeks, please select "5-12 weeks" and delay sending until 2 months prior to requested date#     Provider: Myself    Location: [facility]    Additional Scheduling Information: No scheduling concerns    Prep Specifications:Standard prep    Is the patient taking a GLP-1 Agonist:no    Have you attached a patient to this message: yes        "

## 2024-03-27 ENCOUNTER — TELEPHONE (OUTPATIENT)
Dept: ENDOSCOPY | Facility: HOSPITAL | Age: 74
End: 2024-03-27
Payer: OTHER GOVERNMENT

## 2024-03-27 NOTE — TELEPHONE ENCOUNTER
"----- Message from Devorah Manzo sent at 3/25/2024  3:06 PM CDT -----  Regarding: FW: Endo Case Request    ----- Message -----  From: Eriberto Garcia MD  Sent: 3/25/2024   2:54 PM CDT  To: [facility] Patients  Subject: Endo Case Request                                Procedure: Colonoscopy    Diagnosis: Surveillance colonoscopy - Hx of colon polyps    Procedure Timin-12 weeks    #If within 4 weeks selected, please yamileth as high priority#    #If greater than 12 weeks, please select "5-12 weeks" and delay sending until 2 months prior to requested date#     Provider: Myself    Location: [facility]    Additional Scheduling Information: No scheduling concerns    Prep Specifications:Standard prep    Is the patient taking a GLP-1 Agonist:no    Have you attached a patient to this message: yes        "

## 2024-03-28 ENCOUNTER — TELEPHONE (OUTPATIENT)
Dept: ENDOSCOPY | Facility: HOSPITAL | Age: 74
End: 2024-03-28
Payer: OTHER GOVERNMENT

## 2024-03-28 NOTE — TELEPHONE ENCOUNTER
"----- Message from Devorah Manzo sent at 3/25/2024  3:06 PM CDT -----  Regarding: FW: Endo Case Request    ----- Message -----  From: Eriberto Garcia MD  Sent: 3/25/2024   2:54 PM CDT  To: [facility] Patients  Subject: Endo Case Request                                Procedure: Colonoscopy    Diagnosis: Surveillance colonoscopy - Hx of colon polyps    Procedure Timin-12 weeks    #If within 4 weeks selected, please yamileth as high priority#    #If greater than 12 weeks, please select "5-12 weeks" and delay sending until 2 months prior to requested date#     Provider: Myself    Location: [facility]    Additional Scheduling Information: No scheduling concerns    Prep Specifications:Standard prep    Is the patient taking a GLP-1 Agonist:no    Have you attached a patient to this message: yes        "

## 2024-03-28 NOTE — TELEPHONE ENCOUNTER
Contacted the patient to schedule an endoscopy procedure(s) colonoscopy. The patient did not answer the call and left a voice message requesting a call back.                Endoscopy Scheduling Department  [facility]  [address] [place] [address]  Take the [facility] Elevators to 4th Floor Endoscopy Lab      Date: 03/28/2024      Medical Record # [id number]      Dear  Riky Salcedo     An order for the following procedure(s) Colonoscopy was placed for you by Dr. Garcia.    Since multiple attempts have been made to get in touch with you, this is the last notification. Please call the scheduling nurse to schedule this procedure as soon as possible.    If you have already scheduled this appointment, please disregard this letter. If you would like to cancel this request, please call the number listed below.     Sincerely,        Endoscopy Scheduling Department [phone]        Comments: Office hours are Monday through Friday 8-430p.

## 2024-04-01 ENCOUNTER — TELEPHONE (OUTPATIENT)
Dept: ENDOSCOPY | Facility: HOSPITAL | Age: 74
End: 2024-04-01
Payer: OTHER GOVERNMENT

## 2024-04-01 DIAGNOSIS — Z86.010 HISTORY OF COLON POLYPS: ICD-10-CM

## 2024-04-01 DIAGNOSIS — Z12.11 SCREEN FOR COLON CANCER: Primary | ICD-10-CM

## 2024-04-01 NOTE — TELEPHONE ENCOUNTER
Spoke to pt to schedule procedure(s) Colonoscopy       Physician to perform procedure(s) Dr. DARWIN Garcia  Date of Procedure (s) 5/9/24  Arrival Time 1:30 PM  Time of Procedure(s) 2:30 PM   Location of Procedure(s) [place] 2nd Floor  Type of Rx Prep sent to patient: PEG  Instructions provided to patient via [facility]    Patient was informed on the following information and verbalized understanding. Screening questionnaire reviewed with patient and complete. If procedure requires anesthesia, a responsible adult needs to be present to accompany the patient home, patient cannot drive after receiving anesthesia. Appointment details are tentative, especially check-in time. Patient will receive a prep-op call 7 days prior to confirm check-in time for procedure. If applicable the patient should contact their pharmacy to verify Rx for procedure prep is ready for pick-up. Patient was advised to call the scheduling department at [phone] if pharmacy states no Rx is available. Patient was advised to call the endoscopy scheduling department if any questions or concerns arise.      [facility] Endoscopy Scheduling Department

## 2024-04-08 ENCOUNTER — TELEPHONE (OUTPATIENT)
Dept: ENDOSCOPY | Facility: HOSPITAL | Age: 74
End: 2024-04-08
Payer: OTHER GOVERNMENT

## 2024-04-08 NOTE — TELEPHONE ENCOUNTER
Pt called to add egd to colon scheduled 5/9/24.pt wants egd as well.can we add egd order. Please advise

## 2024-05-02 ENCOUNTER — TELEPHONE (OUTPATIENT)
Dept: ENDOSCOPY | Facility: HOSPITAL | Age: 74
End: 2024-05-02
Payer: OTHER GOVERNMENT

## 2024-05-02 NOTE — TELEPHONE ENCOUNTER
Left voicemail and sent portal message for patient to call Endoscopy Scheduling to review instructions and confirm appointment for Colonoscopy on 5/9/24.

## 2024-05-03 ENCOUNTER — TELEPHONE (OUTPATIENT)
Dept: ENDOSCOPY | Facility: HOSPITAL | Age: 74
End: 2024-05-03
Payer: OTHER GOVERNMENT

## 2024-05-07 ENCOUNTER — TELEPHONE (OUTPATIENT)
Dept: ENDOSCOPY | Facility: HOSPITAL | Age: 74
End: 2024-05-07
Payer: OTHER GOVERNMENT

## 2024-05-08 ENCOUNTER — ANESTHESIA EVENT (OUTPATIENT)
Dept: ENDOSCOPY | Facility: HOSPITAL | Age: 74
End: 2024-05-08
Payer: MEDICARE

## 2024-05-08 NOTE — ANESTHESIA PREPROCEDURE EVALUATION
05/08/2024  Riky Salcedo is a 74 y.o., male.  .mk    Pre-op Assessment    I have reviewed the Patient Summary Reports.     I have reviewed the Nursing Notes. I have reviewed the NPO Status.   I have reviewed the Medications.     Review of Systems  Anesthesia Hx:  No problems with previous Anesthesia             Denies Family Hx of Anesthesia complications.    Denies Personal Hx of Anesthesia complications.                    Social:  Alcohol Use HSV-1      Hematology/Oncology:  Hematology Normal   Oncology Normal                                   EENT/Dental:  EENT/Dental Normal           Cardiovascular:     Hypertension Valvular problems/murmurs: Aortic atherosclerosis.                                       Pulmonary:        Sleep Apnea Lung granuloma               Renal/:  Renal/ Normal   BPH: Peyronies disease.              Hepatic/GI:  Hepatic/GI Normal                 Musculoskeletal:  Musculoskeletal Normal                Neurological:  Neurology Normal                                      Endocrine:  Endocrine Normal          Obesity / BMI > 30  Dermatological:  Skin Normal    Psych:  Psychiatric Normal                       Anesthesia Plan  Type of Anesthesia, risks & benefits discussed:    Anesthesia Type: Gen Natural Airway  Intra-op Monitoring Plan: Standard ASA Monitors  Post Op Pain Control Plan: multimodal analgesia  Induction:  IV  Informed Consent: Informed consent signed with the Patient and all parties understand the risks and agree with anesthesia plan.  All questions answered. Patient consented to blood products? No  ASA Score: 2  Day of Surgery Review of History & Physical: H&P Update referred to the surgeon/provider.    Ready For Surgery From Anesthesia Perspective.     .

## 2024-05-09 ENCOUNTER — ANESTHESIA (OUTPATIENT)
Dept: ENDOSCOPY | Facility: HOSPITAL | Age: 74
End: 2024-05-09
Payer: MEDICARE

## 2024-05-09 ENCOUNTER — HOSPITAL ENCOUNTER (OUTPATIENT)
Facility: HOSPITAL | Age: 74
Discharge: HOME | End: 2024-05-09
Attending: STUDENT IN AN ORGANIZED HEALTH CARE EDUCATION/TRAINING PROGRAM | Admitting: STUDENT IN AN ORGANIZED HEALTH CARE EDUCATION/TRAINING PROGRAM
Payer: MEDICARE

## 2024-05-09 VITALS
OXYGEN SATURATION: 95 % | HEIGHT: 72 IN | RESPIRATION RATE: 16 BRPM | DIASTOLIC BLOOD PRESSURE: 58 MMHG | SYSTOLIC BLOOD PRESSURE: 121 MMHG | HEART RATE: 80 BPM | WEIGHT: 235 LBS | BODY MASS INDEX: 31.83 KG/M2 | TEMPERATURE: 98 F

## 2024-05-09 DIAGNOSIS — Z12.11 COLON CANCER SCREENING: Primary | ICD-10-CM

## 2024-05-09 PROCEDURE — 37000009 HC ANESTHESIA EA ADD 15 MINS: Performed by: STUDENT IN AN ORGANIZED HEALTH CARE EDUCATION/TRAINING PROGRAM

## 2024-05-09 PROCEDURE — 99900035 HC TECH TIME PER 15 MIN (STAT)

## 2024-05-09 PROCEDURE — 45385 COLONOSCOPY W/LESION REMOVAL: CPT | Mod: PT | Performed by: STUDENT IN AN ORGANIZED HEALTH CARE EDUCATION/TRAINING PROGRAM

## 2024-05-09 PROCEDURE — 45385 COLONOSCOPY W/LESION REMOVAL: CPT | Mod: PT,,, | Performed by: STUDENT IN AN ORGANIZED HEALTH CARE EDUCATION/TRAINING PROGRAM

## 2024-05-09 PROCEDURE — 27201089 HC SNARE, DISP (ANY): Performed by: STUDENT IN AN ORGANIZED HEALTH CARE EDUCATION/TRAINING PROGRAM

## 2024-05-09 PROCEDURE — 88305 TISSUE EXAM BY PATHOLOGIST: CPT | Performed by: PATHOLOGY

## 2024-05-09 PROCEDURE — 25000003 PHARM REV CODE 250: Performed by: NURSE ANESTHETIST, CERTIFIED REGISTERED

## 2024-05-09 PROCEDURE — 37000008 HC ANESTHESIA 1ST 15 MINUTES: Performed by: STUDENT IN AN ORGANIZED HEALTH CARE EDUCATION/TRAINING PROGRAM

## 2024-05-09 PROCEDURE — D9220A PRA ANESTHESIA: Mod: PT,,, | Performed by: NURSE ANESTHETIST, CERTIFIED REGISTERED

## 2024-05-09 PROCEDURE — 88305 TISSUE EXAM BY PATHOLOGIST: CPT | Mod: 26,,, | Performed by: PATHOLOGY

## 2024-05-09 PROCEDURE — 94761 N-INVAS EAR/PLS OXIMETRY MLT: CPT

## 2024-05-09 RX ORDER — SODIUM CHLORIDE 9 MG/ML
INJECTION, SOLUTION INTRAVENOUS CONTINUOUS
Status: DISCONTINUED | OUTPATIENT
Start: 2024-05-09 | End: 2024-05-09 | Stop reason: HOSPADM

## 2024-05-09 RX ORDER — LIDOCAINE HYDROCHLORIDE 20 MG/ML
INJECTION INTRAVENOUS
Status: DISCONTINUED | OUTPATIENT
Start: 2024-05-09 | End: 2024-05-09

## 2024-05-09 RX ORDER — PROPOFOL 10 MG/ML
VIAL (ML) INTRAVENOUS CONTINUOUS PRN
Status: DISCONTINUED | OUTPATIENT
Start: 2024-05-09 | End: 2024-05-09

## 2024-05-09 RX ADMIN — Medication 150 MCG/KG/MIN: at 02:05

## 2024-05-09 RX ADMIN — Medication 60 MG: at 02:05

## 2024-05-09 RX ADMIN — LIDOCAINE HYDROCHLORIDE 20 MG: 20 INJECTION INTRAVENOUS at 02:05

## 2024-05-09 RX ADMIN — SODIUM CHLORIDE: 0.9 INJECTION, SOLUTION INTRAVENOUS at 01:05

## 2024-05-09 NOTE — TRANSFER OF CARE
Anesthesia Transfer of Care Note    Patient: Riky Salcedo    Procedure(s) Performed: Procedure(s) (LRB):  COLONOSCOPY (N/A)    Patient location: PACU    Anesthesia Type: general    Transport from OR: Transported from OR on room air with adequate spontaneous ventilation    Post pain: adequate analgesia    Post assessment: no apparent anesthetic complications    Post vital signs: stable    Level of consciousness: awake and lethargic    Nausea/Vomiting: no nausea/vomiting    Complications: none    Transfer of care protocol was followed      Last vitals: Visit Vitals  /60 (BP Location: Left arm, Patient Position: Lying)   Pulse 82   Temp 36.6 °C (97.9 °F) (Temporal)   Resp 18   Ht 6' (1.829 m)   Wt 106.6 kg (235 lb)   SpO2 95%   BMI 31.87 kg/m²

## 2024-05-09 NOTE — ANESTHESIA POSTPROCEDURE EVALUATION
Anesthesia Post Evaluation    Patient: Riky Salcedo    Procedure(s) Performed: Procedure(s) (LRB):  COLONOSCOPY (N/A)    Final Anesthesia Type: general      Patient location during evaluation: PACU  Patient participation: Yes- Able to Participate  Level of consciousness: awake and alert  Post-procedure vital signs: reviewed and stable  Pain management: adequate  Airway patency: patent    PONV status at discharge: No PONV  Anesthetic complications: no      Cardiovascular status: stable  Respiratory status: room air  Hydration status: euvolemic  Follow-up not needed.              Vitals Value Taken Time   /59 05/09/24 1456   Temp 36.7 °C (98.1 °F) 05/09/24 1425   Pulse 77 05/09/24 1501   Resp 18 05/09/24 1501   SpO2 95 % 05/09/24 1501   Vitals shown include unfiled device data.      Event Time   Out of Recovery 14:40:00         Pain/Conrad Score: Conrad Score: 10 (5/9/2024  2:30 PM)

## 2024-05-09 NOTE — PROVATION PATIENT INSTRUCTIONS
Discharge Summary/Instructions after an Endoscopic Procedure  Patient Name: Riky Pat  Patient MRN: [id number]  Patient YOB: 1950  Thursday, May 9, 2024  Eriberto Garcia MD  Dear patient,  As a result of recent federal legislation (The Federal Cures Act), you may   receive lab or pathology results from your procedure in your [facility]   account before your physician is able to contact you. Your physician or   their representative will relay the results to you with their   recommendations at their soonest availability.  Thank you,  RESTRICTIONS:  During your procedure today, you received medications for sedation.  These   medications may affect your judgment, balance and coordination.  Therefore,   for 24 hours, you have the following restrictions:   - DO NOT drive a car, operate machinery, make legal/financial decisions,   sign important papers or drink alcohol.    ACTIVITY:  Today: no heavy lifting, straining or running due to procedural   sedation/anesthesia.  The following day: return to full activity including work.  DIET:  Eat and drink normally unless instructed otherwise.     TREATMENT FOR COMMON SIDE EFFECTS:  - Mild abdominal pain, nausea, belching, bloating or excessive gas:  rest,   eat lightly and use a heating pad.  - Sore Throat: treat with throat lozenges and/or gargle with warm salt   water.  - Because air was used during the procedure, expelling large amounts of air   from your rectum or belching is normal.  - If a bowel prep was taken, you may not have a bowel movement for 1-3 days.    This is normal.  SYMPTOMS TO WATCH FOR AND REPORT TO YOUR PHYSICIAN:  1. Abdominal pain or bloating, other than gas cramps.  2. Chest pain.  3. Back pain.  4. Signs of infection such as: chills or fever occurring within 24 hours   after the procedure.  5. Rectal bleeding, which would show as bright red, maroon, or black stools.   (A tablespoon of blood from the rectum is not serious, especially  if   hemorrhoids are present.)  6. Vomiting.  7. Weakness or dizziness.  GO DIRECTLY TO THE NEAREST EMERGENCY ROOM IF YOU HAVE ANY OF THE FOLLOWING:      Difficulty breathing              Chills and/or fever over 101 F   Persistent vomiting and/or vomiting blood   Severe abdominal pain   Severe chest pain   Black, tarry stools   Bleeding- more than one tablespoon   Any other symptom or condition that you feel may need urgent attention  Your doctor recommends these additional instructions:  If any biopsies were taken, your doctors clinic will contact you in 1 to 2   weeks with any results.  - Discharge patient to home (ambulatory).   - Patient has a contact number available for emergencies.  The signs and   symptoms of potential delayed complications were discussed with the   patient.  Return to normal activities tomorrow.  Written discharge   instructions were provided to the patient.   - Resume previous diet.   - Continue present medications.   - Return to primary care physician as previously scheduled.   - Repeat colonoscopy in 3 years for surveillance.  For questions, problems or results please call your physician - Eriberto Garcia MD at Work:  [phone].  [facility] [place], EMERGENCY ROOM PHONE NUMBER: [phone]  IF A COMPLICATION OR EMERGENCY SITUATION ARISES AND YOU ARE UNABLE TO REACH   YOUR PHYSICIAN - GO DIRECTLY TO THE EMERGENCY ROOM.  Eriberto Garcia MD  5/9/2024 2:21:46 PM  This report has been verified and signed electronically.  Dear patient,  As a result of recent federal legislation (The Federal Cures Act), you may   receive lab or pathology results from your procedure in your [facility]   account before your physician is able to contact you. Your physician or   their representative will relay the results to you with their   recommendations at their soonest availability.  Thank you,  PROVATION

## 2024-05-09 NOTE — PLAN OF CARE
Surgery checklst completed pt aao , waiting for procedural and anesthesia consents. Vss, belongings placed in locker 9. Questions answered. Pt has no concerns or needs at this time.

## 2024-05-09 NOTE — PLAN OF CARE
Patient given discharge instructions,patient verbalizes understanding of all discharge instructions with no questions.  IV removed, cath tip intact, patient discharged via a wheelchair to private vehicle.   No

## 2024-05-14 ENCOUNTER — LAB VISIT (OUTPATIENT)
Dept: LAB | Facility: HOSPITAL | Age: 74
End: 2024-05-14
Attending: FAMILY MEDICINE
Payer: MEDICARE

## 2024-05-14 DIAGNOSIS — R73.03 PREDIABETES: ICD-10-CM

## 2024-05-14 DIAGNOSIS — I10 PRIMARY HYPERTENSION: ICD-10-CM

## 2024-05-14 LAB
ALBUMIN SERPL BCP-MCNC: 3.6 G/DL (ref 3.5–5.2)
ALP SERPL-CCNC: 86 U/L (ref 55–135)
ALT SERPL W/O P-5'-P-CCNC: 26 U/L (ref 10–44)
ANION GAP SERPL CALC-SCNC: 13 MMOL/L (ref 8–16)
AST SERPL-CCNC: 21 U/L (ref 10–40)
BASOPHILS # BLD AUTO: 0.07 K/UL (ref 0–0.2)
BASOPHILS NFR BLD: 1 % (ref 0–1.9)
BILIRUB SERPL-MCNC: 0.2 MG/DL (ref 0.1–1)
BUN SERPL-MCNC: 23 MG/DL (ref 8–23)
CALCIUM SERPL-MCNC: 9.6 MG/DL (ref 8.7–10.5)
CHLORIDE SERPL-SCNC: 108 MMOL/L (ref 95–110)
CHOLEST SERPL-MCNC: 215 MG/DL (ref 120–199)
CHOLEST/HDLC SERPL: 5 {RATIO} (ref 2–5)
CO2 SERPL-SCNC: 19 MMOL/L (ref 23–29)
CREAT SERPL-MCNC: 1.1 MG/DL (ref 0.5–1.4)
DIFFERENTIAL METHOD BLD: ABNORMAL
EOSINOPHIL # BLD AUTO: 0.3 K/UL (ref 0–0.5)
EOSINOPHIL NFR BLD: 4.4 % (ref 0–8)
ERYTHROCYTE [DISTWIDTH] IN BLOOD BY AUTOMATED COUNT: 14.7 % (ref 11.5–14.5)
EST. GFR  (NO RACE VARIABLE): >60 ML/MIN/1.73 M^2
ESTIMATED AVG GLUCOSE: 114 MG/DL (ref 68–131)
FINAL PATHOLOGIC DIAGNOSIS: NORMAL
GLUCOSE SERPL-MCNC: 101 MG/DL (ref 70–110)
GROSS: NORMAL
HBA1C MFR BLD: 5.6 % (ref 4–5.6)
HCT VFR BLD AUTO: 43.3 % (ref 40–54)
HDLC SERPL-MCNC: 43 MG/DL (ref 40–75)
HDLC SERPL: 20 % (ref 20–50)
HGB BLD-MCNC: 13.8 G/DL (ref 14–18)
IMM GRANULOCYTES # BLD AUTO: 0.03 K/UL (ref 0–0.04)
IMM GRANULOCYTES NFR BLD AUTO: 0.4 % (ref 0–0.5)
LDLC SERPL CALC-MCNC: 110.2 MG/DL (ref 63–159)
LYMPHOCYTES # BLD AUTO: 1.9 K/UL (ref 1–4.8)
LYMPHOCYTES NFR BLD: 25.7 % (ref 18–48)
Lab: NORMAL
MCH RBC QN AUTO: 29.7 PG (ref 27–31)
MCHC RBC AUTO-ENTMCNC: 31.9 G/DL (ref 32–36)
MCV RBC AUTO: 93 FL (ref 82–98)
MONOCYTES # BLD AUTO: 0.7 K/UL (ref 0.3–1)
MONOCYTES NFR BLD: 9.3 % (ref 4–15)
NEUTROPHILS # BLD AUTO: 4.3 K/UL (ref 1.8–7.7)
NEUTROPHILS NFR BLD: 59.2 % (ref 38–73)
NONHDLC SERPL-MCNC: 172 MG/DL
NRBC BLD-RTO: 0 /100 WBC
PLATELET # BLD AUTO: 223 K/UL (ref 150–450)
PMV BLD AUTO: 11.8 FL (ref 9.2–12.9)
POTASSIUM SERPL-SCNC: 5.2 MMOL/L (ref 3.5–5.1)
PROT SERPL-MCNC: 6.9 G/DL (ref 6–8.4)
RBC # BLD AUTO: 4.64 M/UL (ref 4.6–6.2)
SODIUM SERPL-SCNC: 140 MMOL/L (ref 136–145)
TRIGL SERPL-MCNC: 309 MG/DL (ref 30–150)
TSH SERPL DL<=0.005 MIU/L-ACNC: 2.7 UIU/ML (ref 0.4–4)
WBC # BLD AUTO: 7.28 K/UL (ref 3.9–12.7)

## 2024-05-14 PROCEDURE — 36415 COLL VENOUS BLD VENIPUNCTURE: CPT | Mod: PO | Performed by: FAMILY MEDICINE

## 2024-05-14 PROCEDURE — 84443 ASSAY THYROID STIM HORMONE: CPT | Performed by: FAMILY MEDICINE

## 2024-05-14 PROCEDURE — 80053 COMPREHEN METABOLIC PANEL: CPT | Performed by: FAMILY MEDICINE

## 2024-05-14 PROCEDURE — 85025 COMPLETE CBC W/AUTO DIFF WBC: CPT | Performed by: FAMILY MEDICINE

## 2024-05-14 PROCEDURE — 83036 HEMOGLOBIN GLYCOSYLATED A1C: CPT | Performed by: FAMILY MEDICINE

## 2024-05-14 PROCEDURE — 80061 LIPID PANEL: CPT | Performed by: FAMILY MEDICINE

## 2024-05-20 ENCOUNTER — OFFICE VISIT (OUTPATIENT)
Dept: FAMILY MEDICINE | Facility: CLINIC | Age: 74
End: 2024-05-20
Payer: MEDICARE

## 2024-05-20 VITALS
BODY MASS INDEX: 31.41 KG/M2 | SYSTOLIC BLOOD PRESSURE: 106 MMHG | OXYGEN SATURATION: 96 % | HEIGHT: 72 IN | HEART RATE: 86 BPM | WEIGHT: 231.88 LBS | DIASTOLIC BLOOD PRESSURE: 64 MMHG

## 2024-05-20 DIAGNOSIS — R73.03 PREDIABETES: ICD-10-CM

## 2024-05-20 DIAGNOSIS — F10.19 ALCOHOL ABUSE WITH ALCOHOL-INDUCED DISORDER: ICD-10-CM

## 2024-05-20 DIAGNOSIS — H93.13 TINNITUS OF BOTH EARS: ICD-10-CM

## 2024-05-20 DIAGNOSIS — I10 PRIMARY HYPERTENSION: Primary | ICD-10-CM

## 2024-05-20 DIAGNOSIS — D53.9 NUTRITIONAL ANEMIA: ICD-10-CM

## 2024-05-20 DIAGNOSIS — E78.5 HYPERLIPIDEMIA, UNSPECIFIED HYPERLIPIDEMIA TYPE: ICD-10-CM

## 2024-05-20 DIAGNOSIS — G47.33 OSA (OBSTRUCTIVE SLEEP APNEA): ICD-10-CM

## 2024-05-20 PROCEDURE — 99999 PR PBB SHADOW E&M-EST. PATIENT-LVL III: CPT | Mod: PBBFAC,,, | Performed by: FAMILY MEDICINE

## 2024-05-20 PROCEDURE — 99215 OFFICE O/P EST HI 40 MIN: CPT | Mod: S$PBB,,, | Performed by: FAMILY MEDICINE

## 2024-05-20 PROCEDURE — 99213 OFFICE O/P EST LOW 20 MIN: CPT | Mod: PBBFAC,PO | Performed by: FAMILY MEDICINE

## 2024-05-20 NOTE — PROGRESS NOTES
Subjective     Patient ID: Riky Salcedo is a 74 y.o. male.    Chief Complaint: Hypertension      74 years old male came to the clinic for blood pressure check.  Patient with good compliance with lisinopril.  No chest pain, palpitation, orthopnea or PND.  Patient  reports improvement with treatment of sleep apnea but also had problems with CPAP equipment sometimes.  Patient with a BMI of 31 currently trying to lose weight.  Patient with mild anemia but stable in comparison with previous reports.  Patient with elevated cholesterol and prefer only to continue with diet.  Patient is concerned about possible side effects of statins. Last A1c at the level of prediabetes.  He reports that he is still drinking alcohol.    Hypertension  Pertinent negatives include no chest pain or palpitations.   Ringing in Ears:   Chronicity:  Chronic  Onset:  More than 1 year ago  Progression since onset:  Unchanged  Frequency:  Constantly  Duration:  Off/on all day  Ringing in ear characteristics:  Moderate   Associated symptoms: Tinnitus.    Aggravated by:  Nothing  Improvement on treatment:  No relief  Hyperlipidemia  This is a chronic problem. The current episode started more than 1 year ago. The problem is uncontrolled. Recent lipid tests were reviewed and are high. Exacerbating diseases include obesity. Factors aggravating his hyperlipidemia include fatty foods. Pertinent negatives include no chest pain or myalgias. He is currently on no antihyperlipidemic treatment. The current treatment provides no improvement of lipids. Compliance problems include adherence to exercise, adherence to diet and medication side effects.  Risk factors for coronary artery disease include male sex, a sedentary lifestyle, stress and obesity.     Review of Systems   Constitutional: Negative.    HENT:  Positive for tinnitus.    Eyes: Negative.    Respiratory: Negative.     Cardiovascular: Negative.  Negative for chest pain, palpitations, leg swelling  and claudication.   Gastrointestinal: Negative.    Genitourinary: Negative.    Musculoskeletal: Negative.  Negative for myalgias.   Integumentary:  Negative.   Neurological: Negative.    Psychiatric/Behavioral: Negative.            Objective     Physical Exam  Vitals and nursing note reviewed.   Constitutional:       General: He is not in acute distress.     Appearance: He is well-developed. He is not diaphoretic.   HENT:      Head: Normocephalic and atraumatic.      Right Ear: External ear normal.      Left Ear: External ear normal.      Nose: Nose normal.      Mouth/Throat:      Pharynx: No oropharyngeal exudate.   Eyes:      General: No scleral icterus.        Right eye: No discharge.         Left eye: No discharge.      Conjunctiva/sclera: Conjunctivae normal.      Pupils: Pupils are equal, round, and reactive to light.   Neck:      Thyroid: No thyromegaly.      Vascular: No JVD.      Trachea: No tracheal deviation.   Cardiovascular:      Rate and Rhythm: Normal rate and regular rhythm.      Heart sounds: Normal heart sounds. No murmur heard.     No friction rub. No gallop.   Pulmonary:      Effort: Pulmonary effort is normal. No respiratory distress.      Breath sounds: Normal breath sounds. No stridor. No wheezing or rales.   Chest:      Chest wall: No tenderness.   Abdominal:      General: Bowel sounds are normal. There is no distension.      Palpations: Abdomen is soft. There is no mass.      Tenderness: There is no abdominal tenderness. There is no guarding or rebound.   Musculoskeletal:         General: No tenderness. Normal range of motion.      Cervical back: Normal range of motion and neck supple.   Lymphadenopathy:      Cervical: No cervical adenopathy.   Skin:     General: Skin is warm and dry.      Coloration: Skin is not pale.      Findings: No erythema or rash.   Neurological:      Mental Status: He is alert and oriented to person, place, and time.      Cranial Nerves: No cranial nerve deficit.       Motor: No abnormal muscle tone.      Coordination: Coordination normal.      Deep Tendon Reflexes: Reflexes are normal and symmetric. Reflexes normal.   Psychiatric:         Behavior: Behavior normal.         Thought Content: Thought content normal.         Judgment: Judgment normal.            Assessment and Plan     1. Primary hypertension  -     Urinalysis; Future  -     Comprehensive Metabolic Panel; Future; Expected date: 05/20/2024  -     Lipid Panel; Future; Expected date: 05/20/2024  -     TSH; Future; Expected date: 05/20/2024  -     CBC Auto Differential; Future; Expected date: 05/20/2024    2. Tinnitus of both ears    3. Prediabetes  -     Hemoglobin A1C; Future; Expected date: 05/20/2024    4. Alcohol abuse with alcohol-induced disorder  -     Comprehensive Metabolic Panel; Future; Expected date: 05/20/2024    5. Nutritional anemia  -     CBC Auto Differential; Future; Expected date: 05/20/2024    6. Hyperlipidemia, unspecified hyperlipidemia type    7. IRLANDA (obstructive sleep apnea)      I spent a total of 40 minutes on the day of the visit.This includes face to face time and non-face to face time preparing to see the patient (eg, review of tests), obtaining and/or reviewing separately obtained history, documenting clinical information in the electronic or other health record, independently interpreting results and communicating results to the patient/family/caregiver, or care coordinator.            Follow up in about 6 months (around 11/20/2024), or if symptoms worsen or fail to improve.

## 2024-05-27 ENCOUNTER — PATIENT MESSAGE (OUTPATIENT)
Dept: FAMILY MEDICINE | Facility: CLINIC | Age: 74
End: 2024-05-27
Payer: OTHER GOVERNMENT

## 2024-06-06 ENCOUNTER — OFFICE VISIT (OUTPATIENT)
Dept: SLEEP MEDICINE | Facility: CLINIC | Age: 74
End: 2024-06-06
Attending: PSYCHIATRY & NEUROLOGY
Payer: MEDICARE

## 2024-06-06 VITALS
HEIGHT: 72 IN | WEIGHT: 230.5 LBS | DIASTOLIC BLOOD PRESSURE: 62 MMHG | BODY MASS INDEX: 31.22 KG/M2 | HEART RATE: 86 BPM | SYSTOLIC BLOOD PRESSURE: 133 MMHG

## 2024-06-06 DIAGNOSIS — G47.33 OSA (OBSTRUCTIVE SLEEP APNEA): Primary | ICD-10-CM

## 2024-06-06 PROCEDURE — 99214 OFFICE O/P EST MOD 30 MIN: CPT | Mod: S$PBB,,, | Performed by: NURSE PRACTITIONER

## 2024-06-06 PROCEDURE — 99212 OFFICE O/P EST SF 10 MIN: CPT | Mod: PBBFAC | Performed by: NURSE PRACTITIONER

## 2024-06-06 PROCEDURE — 99999 PR PBB SHADOW E&M-EST. PATIENT-LVL II: CPT | Mod: PBBFAC,,, | Performed by: NURSE PRACTITIONER

## 2024-06-06 NOTE — PROGRESS NOTES
Cc: ILRANDA,new to me    He has improved his mask on time, cpap 5-10cm. When pressure higher mask flubbers onand off face. Strap marks if tightens it too much. Pressure gets strong at times and is pounding so he has to turn machine off then on again. Using f30im ask. Using nose strips to ensure more nasal patency and keep mouth closed.     Interrogation/remote review 30davg 6.4h/n AHI 1.0,. 90% tile 8.2cm.      + nasal congewstion + has deviated septus.      PSG 1/2024: AHI 6.5/ lowest oxygen saturation during sleep was 88.0%      ASSESSMENT:  1. IRLANDA. Adherent with cpap, benefits resolution of daytime sleepiness. ESs<5. Having some pressure intolerance        PLAN:  Continue APAP 5-10cm. Snug up lower part of mask, consider BIPAP titration study/notify me if pressure still too strong/having to re-ramp or turn off/on again  DME S supplies. Discussed effectiveness of therapy when mask on  Rtc 5-6 mos re-eval

## 2024-06-24 ENCOUNTER — OFFICE VISIT (OUTPATIENT)
Dept: INTERNAL MEDICINE | Facility: CLINIC | Age: 74
End: 2024-06-24
Payer: MEDICARE

## 2024-06-24 VITALS
HEART RATE: 92 BPM | SYSTOLIC BLOOD PRESSURE: 121 MMHG | DIASTOLIC BLOOD PRESSURE: 70 MMHG | HEIGHT: 72 IN | WEIGHT: 235 LBS | OXYGEN SATURATION: 96 % | BODY MASS INDEX: 31.83 KG/M2

## 2024-06-24 DIAGNOSIS — S80.862A INSECT BITE OF LEFT LOWER LEG, INITIAL ENCOUNTER: ICD-10-CM
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
## 2024-06-24 DIAGNOSIS — R21 RASH: Primary | ICD-10-CM

## 2024-06-24 DIAGNOSIS — B35.1 ONYCHOMYCOSIS DUE TO DERMATOPHYTE: ICD-10-CM

## 2024-06-24 PROCEDURE — 99213 OFFICE O/P EST LOW 20 MIN: CPT | Mod: PBBFAC | Performed by: INTERNAL MEDICINE

## 2024-06-24 PROCEDURE — 99214 OFFICE O/P EST MOD 30 MIN: CPT | Mod: S$PBB,,, | Performed by: INTERNAL MEDICINE

## 2024-06-24 PROCEDURE — 99999 PR PBB SHADOW E&M-EST. PATIENT-LVL III: CPT | Mod: PBBFAC,,, | Performed by: INTERNAL MEDICINE

## 2024-06-24 RX ORDER — CICLOPIROX 80 MG/ML
SOLUTION TOPICAL NIGHTLY
Qty: 6.6 ML | Refills: 3 | Status: SHIPPED | OUTPATIENT
Start: 2024-06-24

## 2024-06-24 RX ORDER — MUPIROCIN 20 MG/G
OINTMENT TOPICAL 3 TIMES DAILY
Qty: 15 G | Refills: 3 | Status: SHIPPED | OUTPATIENT
Start: 2024-06-24

## 2024-06-24 NOTE — PROGRESS NOTES
Subjective:       Patient ID: Riky Salcedo is a 74 y.o. male.    Chief Complaint: rash right side leg and Insect Bite    HPI    Patient presents for rash and possible insect bite.    He states he was out cutting the grass a few days ago and felt like he possibly had a bite from an insect.  He then noticed a erythematous rash over the area.  There were some small vesicles as well but these have now popped.  He feels like the rash is now changing color and becoming darker red.  He did note some itching to the area.  He denies any fevers chills body aches.    He has also been having a rash on his feet bilaterally with redness and scaling of the skin.  He has been applying creams to his feet he can not remember which ones.  He states this has been helping.      Review of Systems   Constitutional:  Negative for chills, diaphoresis, fatigue and fever.   HENT:  Negative for rhinorrhea, sneezing and sore throat.    Respiratory:  Negative for cough, chest tightness, shortness of breath and wheezing.    Cardiovascular:  Negative for chest pain, palpitations and leg swelling.   Gastrointestinal:  Negative for abdominal pain, blood in stool, diarrhea, nausea and vomiting.   Musculoskeletal:  Negative for arthralgias and back pain.   Neurological:  Negative for dizziness, weakness, light-headedness and headaches.   Psychiatric/Behavioral:  Negative for agitation and behavioral problems.            Past Medical History:   Diagnosis Date    Cataract     Fever blister     HSV-1 (herpes simplex virus 1) infection     Hyperlipidemia     Hypertension     Keloid cicatrix     Low serum testosterone level     Obese     Peyronie's disease     Sleep apnea, unspecified      Past Surgical History:   Procedure Laterality Date    COLONOSCOPY N/A 8/5/2016    Procedure: COLONOSCOPY;  Surgeon: Denis Herzog MD;  Location: [address];  Service: Endoscopy;  Laterality: N/A;  has hemorrhoid that may need to be banded    COLONOSCOPY  N/A 7/29/2021    Procedure: COLONOSCOPY;  Surgeon: Deejay Corley MD;  Location: [facility] ENDO (4TH FLR);  Service: Endoscopy;  Laterality: N/A;  Pt. is fully vaccinated.EC    COLONOSCOPY N/A 5/9/2024    Procedure: COLONOSCOPY;  Surgeon: Eriberto Garcia MD;  [facility];  Service: Endoscopy;  Laterality: N/A;  4/1/24-Dr. Garcia pt, 5-12wks, PEG, instr portal-DS  5/2/24- lvm and portal msg for pc. DBM  5/3/24- pc complete. DBM    HERNIA REPAIR      LIPOMA RESECTION N/A 11/30/2022    Procedure: EXCISION, LIPOMA Back;  Surgeon: Clayton Madrid MD;  Location: [facility] OR 2ND FLR;  Service: General;  Laterality: N/A;    UMBILICAL HERNIA REPAIR        Patient Active Problem List   Diagnosis    Hypertension    HSV-1 (herpes simplex virus 1) infection    Male hypogonadism    Peyronie's disease    BPH with urinary obstruction    Lipoma of back    Alcohol abuse with alcohol-induced disorder    Hypercholesteremia    Poor diet    Class 1 obesity due to excess calories without serious comorbidity with body mass index (BMI) of 32.0 to 32.9 in adult    Left arm pain    Chest discomfort    Lung granuloma    Erectile dysfunction due to arterial insufficiency    Adverse effect of statin    IRLANDA (obstructive sleep apnea)    Aortic atherosclerosis        Objective:      Physical Exam  Constitutional:       Appearance: Normal appearance.   HENT:      Head: Normocephalic and atraumatic.   Cardiovascular:      Rate and Rhythm: Normal rate and regular rhythm.      Heart sounds: Normal heart sounds.   Pulmonary:      Effort: Pulmonary effort is normal.      Breath sounds: Normal breath sounds. No stridor. No wheezing or rales.   Abdominal:      General: Abdomen is flat.      Palpations: Abdomen is soft. There is no mass.      Tenderness: There is no abdominal tenderness.   Skin:     General: Skin is warm and dry.   Neurological:      Mental Status: He is alert and oriented to person, place, and time.   Psychiatric:          Mood and Affect: Mood normal.         Assessment:       Problem List Items Addressed This Visit    None  Visit Diagnoses       Rash    -  Primary    Relevant Orders    E-Consult to Dermatology    Onychomycosis due to dermatophyte        Relevant Orders    Ambulatory referral/consult to Podiatry    Insect bite of left lower leg, initial encounter                Plan:         Riky was seen today for rash right side leg and insect bite.    Diagnoses and all orders for this visit:    Rash  Insect bite of left lower leg, initial encounter  -     mupirocin (BACTROBAN) 2 % ointment; Apply topically 3 (three) times daily.  -     E-Consult to Dermatology  Advised he take over-the-counter Zyrtec and apply antibacterial ointment over the area.  Possibly due to insect bite.  Advised to return to care if the redness continues to spread.  Does not appear like infection on exam appears more like ecchymosis but we will place a consult to Dermatology for any further recommendations.    Onychomycosis due to dermatophyte  -     Ambulatory referral/consult to Podiatry; Future  -     ciclopirox (PENLAC) 8 % Soln; Apply topically nightly.  Recommended to follow up with Podiatry.                     Sofia Cortez MD   Internal Medicine   Primary Care

## 2024-06-27 ENCOUNTER — E-CONSULT (OUTPATIENT)
Dept: DERMATOLOGY | Facility: CLINIC | Age: 74
End: 2024-06-27

## 2024-06-27 DIAGNOSIS — R58 ECCHYMOSIS: Primary | ICD-10-CM

## 2024-06-27 NOTE — CONSULTS
[facility]  Response for E-Consult     Patient Name: Riky Salcedo  MRN: [id number]  Primary Care Provider: Taj De La Vega MD   Requesting Provider: Sofia Cortez MD  E-Consult to Dermatology  Consult performed by: Aparna Melgar MD  Consult ordered by: Sofia Cortez MD  Reason for consult: Rash  Assessment/Recommendations: Thank you for this consult. I am having a hard time seeing anything other than the ecchymoses from the photographs. If it is asymptomatic, I recommend monitoring and then triamcinolone if pruritic. Please reconsult if the condition worsens or fails to improve             Recommendation: as above     Contingency that warrants a repeat eConsult or referral: none    Total time of Consultation: 10 minute    I did not speak to the requesting provider verbally about this.     *This eConsult is based on the clinical data available to me and is furnished without benefit of a physical examination. The eConsult will need to be interpreted in light of any clinical issues or changes in patient status not available to me at the time of filing this eConsults. Significant changes in patient condition or level of acuity should result in immediate formal consultation and reevaluation. Please alert me if you have further questions.    Thank you for this eConsult referral.     Aparna Melgar MD  [facility]

## 2024-07-01 ENCOUNTER — OFFICE VISIT (OUTPATIENT)
Dept: FAMILY MEDICINE | Facility: CLINIC | Age: 74
End: 2024-07-01
Payer: MEDICARE

## 2024-07-01 VITALS
BODY MASS INDEX: 31.46 KG/M2 | SYSTOLIC BLOOD PRESSURE: 110 MMHG | WEIGHT: 232.25 LBS | HEIGHT: 72 IN | HEART RATE: 93 BPM | DIASTOLIC BLOOD PRESSURE: 60 MMHG | OXYGEN SATURATION: 96 %

## 2024-07-01 DIAGNOSIS — E66.9 OBESITY (BMI 30.0-34.9): ICD-10-CM

## 2024-07-01 DIAGNOSIS — D69.2 SENILE PURPURA: ICD-10-CM

## 2024-07-01 DIAGNOSIS — I10 PRIMARY HYPERTENSION: ICD-10-CM

## 2024-07-01 DIAGNOSIS — E78.5 HYPERLIPIDEMIA, UNSPECIFIED HYPERLIPIDEMIA TYPE: ICD-10-CM

## 2024-07-01 DIAGNOSIS — J84.10 CALCIFIED GRANULOMA OF LUNG: ICD-10-CM

## 2024-07-01 DIAGNOSIS — F10.19 ALCOHOL ABUSE WITH ALCOHOL-INDUCED DISORDER: ICD-10-CM

## 2024-07-01 DIAGNOSIS — Z74.09 OTHER REDUCED MOBILITY: ICD-10-CM

## 2024-07-01 DIAGNOSIS — G47.33 OSA (OBSTRUCTIVE SLEEP APNEA): ICD-10-CM

## 2024-07-01 DIAGNOSIS — Z00.00 ENCOUNTER FOR MEDICARE ANNUAL WELLNESS EXAM: Primary | ICD-10-CM

## 2024-07-01 DIAGNOSIS — Z23 NEED FOR COVID-19 VACCINE: ICD-10-CM

## 2024-07-01 PROBLEM — E66.811 OBESITY (BMI 30.0-34.9): Status: ACTIVE | Noted: 2024-07-01

## 2024-07-01 PROBLEM — J98.4 CALCIFIED GRANULOMA OF LUNG: Status: ACTIVE | Noted: 2019-10-28

## 2024-07-01 PROCEDURE — 99999PBSHW PR PBB SHADOW TECHNICAL ONLY FILED TO HB: Mod: PBBFAC,,,

## 2024-07-01 PROCEDURE — G0439 PPPS, SUBSEQ VISIT: HCPCS | Mod: ,,, | Performed by: NURSE PRACTITIONER

## 2024-07-01 PROCEDURE — 91322 SARSCOV2 VAC 50 MCG/0.5ML IM: CPT | Mod: PBBFAC,PO

## 2024-07-01 PROCEDURE — 99214 OFFICE O/P EST MOD 30 MIN: CPT | Mod: PBBFAC,PO,25 | Performed by: NURSE PRACTITIONER

## 2024-07-01 PROCEDURE — 90480 ADMN SARSCOV2 VAC 1/ONLY CMP: CPT | Mod: PBBFAC,PO

## 2024-07-01 PROCEDURE — 99999 PR PBB SHADOW E&M-EST. PATIENT-LVL IV: CPT | Mod: PBBFAC,,, | Performed by: NURSE PRACTITIONER

## 2024-07-01 RX ORDER — AMOXICILLIN 500 MG
1 CAPSULE ORAL DAILY
COMMUNITY

## 2024-07-01 RX ADMIN — COVID-19 VACCINE, MRNA 0.5 ML: 50 INJECTION, SUSPENSION INTRAMUSCULAR at 01:07

## 2024-07-01 NOTE — PATIENT INSTRUCTIONS
Counseling and Referral of Other Preventative  (Italic type indicates deductible and co-insurance are waived)    Patient Name: Riky Salcedo  Today's Date: 7/1/2024    Health Maintenance       Date Due Completion Date    Shingles Vaccine (2 of 3) 05/22/2018 3/27/2018    COVID-19 Vaccine (5 - 2023-24 season) 09/01/2023 4/5/2022    Influenza Vaccine (1) 09/01/2024 11/20/2023    TETANUS VACCINE 03/24/2025 3/24/2015    Hemoglobin A1c (Prediabetes) 05/14/2025 5/14/2024    Lipid Panel 05/14/2029 5/14/2024        No orders of the defined types were placed in this encounter.      The following information is provided to all patients.  This information is to help you find resources for any of the problems found today that may be affecting your health:                  Living healthy guide: www.[facility].[place].[facility]      Understanding Diabetes: www.diabetes.org      Eating healthy: www.cdc.gov/healthyweight      [facility] home safety checklist: www.cdc.gov/steadi/patient.html      Agency on Aging: www.goea.[place].[facility]      Alcoholics anonymous (AA): www.aa.org      Physical Activity: www.toro.nih.gov/gr8hldu      Tobacco use: www.quitwithusla.org

## 2024-07-01 NOTE — PROGRESS NOTES
Riky Salcedo presented for a  Medicare AWV and comprehensive Health Risk Assessment today. The following components were reviewed and updated:    Medical history  Family History  Social history  Allergies and Current Medications  Health Risk Assessment  Health Maintenance  Care Team         ** See Completed Assessments for Annual Wellness Visit within the encounter summary.**         The following assessments were completed:  Living Situation  CAGE  Depression Screening  Timed Get Up and Go  Whisper Test  Cognitive Function Screening      Nutrition Screening  ADL Screening  PAQ Screening      Opioid documentation:      Patient does not have a current opioid prescription.        Vitals:    07/01/24 1303   BP: 110/60   BP Location: Left arm   Patient Position: Sitting   BP Method: Large (Manual)   Pulse: 93   SpO2: 96%   Weight: 105.3 kg (232 lb 4.1 oz)   Height: 6' (1.829 m)     Body mass index is 31.5 kg/m².    Physical Exam  Vitals reviewed.   Constitutional:       General: He is not in acute distress.     Appearance: Normal appearance. He is well-developed and well-groomed. He is obese.   HENT:      Head: Normocephalic.      Right Ear: External ear normal.      Left Ear: External ear normal.   Eyes:      General:         Right eye: No discharge.         Left eye: No discharge.   Cardiovascular:      Rate and Rhythm: Normal rate.   Pulmonary:      Effort: Pulmonary effort is normal. No respiratory distress.   Abdominal:      General: There is no distension.   Skin:     Coloration: Skin is not pale.      Comments: purpura   Neurological:      Mental Status: He is alert and oriented to person, place, and time.      Coordination: Coordination normal.   Psychiatric:         Attention and Perception: Attention normal.         Mood and Affect: Mood and affect normal.         Speech: Speech normal.         Behavior: Behavior normal. Behavior is cooperative.         Thought Content: Thought content normal.              Diagnoses and health risks identified today and associated recommendations/orders:    1. Encounter for Medicare annual wellness exam  - Ambulatory Referral/Consult to Enhanced Annual Wellness Visit (eAWV)    2. Alcohol abuse with alcohol-induced disorder  Drinks 3-4 beers about 4 times per week, occasionally has 10 beers at a time. Discussed long-term risks of excessive alcohol intake. Follow up with PCP.    3. Calcified granuloma of lung  As seen on previous imaging; stable. Follow up with PCP.    4. Senile purpura  As seen on attached photo; follow up with PCP.    5. Primary hypertension  Chronic; stable on lisinopril medication. Follow up with PCP.    6. IRLANDA (obstructive sleep apnea)  Uses a CPAP at night; follow up with PCP.    7. Hyperlipidemia, unspecified hyperlipidemia type  Chronic; stable on fish oil capsule medication. Follow up with PCP.    8. Need for COVID-19 vaccine  - sars-cov-2 (covid-19) (Spikevax (Moderna) (12yrs and up 2023)) 50 mcg/0.5 mL injection 0.5 mL    9. Other reduced mobility  Ambulates independently; slightly slowed but stable gait. Follow up with PCP.    10. Obesity (BMI 30.0-34.9)  Eat a low salt/low fat diet and discussed importance of engaging in physical activity at least 5x/week for a minimum of 30 min/day.      Provided Riky with a 5-10 year written screening schedule and personal prevention plan. Recommendations were developed using the USPSTF age appropriate recommendations. Education, counseling, and referrals were provided as needed. After Visit Summary printed and given to patient which includes a list of additional screenings/tests needed.    Follow up for your next annual wellness visit.    Acacia Rosenthal NP      Advance Care Planning     I offered to discuss advanced care planning, including how to pick a person who would make decisions for you if you were unable to make them for yourself, called a health care power of , and what kind of decisions you  might make such as use of life sustaining treatments such as ventilators and tube feeding when faced with a life limiting illness recorded on a living will that they will need to know. (How you want to be cared for as you near the end of your natural life)     X  Patient has advanced directives on file, which we reviewed, and they do not wish to make changes.

## 2024-07-22 ENCOUNTER — OFFICE VISIT (OUTPATIENT)
Dept: CARDIOLOGY | Facility: CLINIC | Age: 74
End: 2024-07-22
Payer: MEDICARE

## 2024-07-22 VITALS
HEIGHT: 72 IN | WEIGHT: 234.81 LBS | SYSTOLIC BLOOD PRESSURE: 131 MMHG | DIASTOLIC BLOOD PRESSURE: 79 MMHG | HEART RATE: 71 BPM | BODY MASS INDEX: 31.8 KG/M2

## 2024-07-22 DIAGNOSIS — I10 PRIMARY HYPERTENSION: ICD-10-CM

## 2024-07-22 DIAGNOSIS — E78.00 HYPERCHOLESTEREMIA: ICD-10-CM

## 2024-07-22 DIAGNOSIS — I87.2 VENOUS INSUFFICIENCY: Primary | ICD-10-CM

## 2024-07-22 DIAGNOSIS — F10.19 ALCOHOL ABUSE WITH ALCOHOL-INDUCED DISORDER: Chronic | ICD-10-CM

## 2024-07-22 DIAGNOSIS — I70.0 AORTIC ATHEROSCLEROSIS: ICD-10-CM

## 2024-07-22 PROBLEM — R07.89 CHEST DISCOMFORT: Status: RESOLVED | Noted: 2019-10-17 | Resolved: 2024-07-22

## 2024-07-22 PROCEDURE — 99213 OFFICE O/P EST LOW 20 MIN: CPT | Mod: PBBFAC | Performed by: INTERNAL MEDICINE

## 2024-07-22 PROCEDURE — 99999 PR PBB SHADOW E&M-EST. PATIENT-LVL III: CPT | Mod: PBBFAC,,, | Performed by: INTERNAL MEDICINE

## 2024-07-22 PROCEDURE — 99214 OFFICE O/P EST MOD 30 MIN: CPT | Mod: S$PBB,,, | Performed by: INTERNAL MEDICINE
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
## 2024-08-22 DIAGNOSIS — B00.2 ORAL HERPES SIMPLEX INFECTION: ICD-10-CM

## 2024-08-22 NOTE — TELEPHONE ENCOUNTER
Refill Routing Note   Medication(s) are not appropriate for processing by [facility] for the following reason(s):        No active prescription written by provider    [facility] action(s):  Defer             Appointments  past 12m or future 3m with PCP    Date Provider   Last Visit   5/20/2024 Taj De La Vega MD   Next Visit   11/20/2024 Taj De La Vega MD   ED visits in past 90 days: 0        Note composed:4:09 PM 08/22/2024

## 2024-08-22 NOTE — TELEPHONE ENCOUNTER
No care due was identified.  [facility] Embedded Care Due Messages. Reference number: [id number].   8/22/2024 9:35:46 AM CDT

## 2024-08-23 RX ORDER — VALACYCLOVIR HYDROCHLORIDE 1 G/1
1000 TABLET, FILM COATED ORAL 2 TIMES DAILY
Qty: 6 TABLET | Refills: 0 | OUTPATIENT
Start: 2024-08-23

## 2024-09-11 DIAGNOSIS — B00.9 HERPES INFECTION: Primary | ICD-10-CM

## 2024-09-11 RX ORDER — VALACYCLOVIR HYDROCHLORIDE 1 G/1
1000 TABLET, FILM COATED ORAL 2 TIMES DAILY
Qty: 14 TABLET | Refills: 0 | Status: SHIPPED | OUTPATIENT
Start: 2024-09-11 | End: 2024-09-18

## 2024-11-06 ENCOUNTER — OFFICE VISIT (OUTPATIENT)
Dept: PULMONOLOGY | Facility: CLINIC | Age: 74
End: 2024-11-06
Payer: MEDICARE

## 2024-11-06 ENCOUNTER — PATIENT MESSAGE (OUTPATIENT)
Dept: PULMONOLOGY | Facility: CLINIC | Age: 74
End: 2024-11-06

## 2024-11-06 VITALS
BODY MASS INDEX: 31.69 KG/M2 | DIASTOLIC BLOOD PRESSURE: 82 MMHG | SYSTOLIC BLOOD PRESSURE: 138 MMHG | OXYGEN SATURATION: 96 % | HEART RATE: 74 BPM | WEIGHT: 233.69 LBS

## 2024-11-06 DIAGNOSIS — J84.9 ILD (INTERSTITIAL LUNG DISEASE): Primary | ICD-10-CM

## 2024-11-06 DIAGNOSIS — E66.811 OBESITY (BMI 30.0-34.9): ICD-10-CM

## 2024-11-06 DIAGNOSIS — E78.00 HYPERCHOLESTEREMIA: ICD-10-CM

## 2024-11-06 PROCEDURE — 99213 OFFICE O/P EST LOW 20 MIN: CPT | Mod: PBBFAC | Performed by: INTERNAL MEDICINE

## 2024-11-06 PROCEDURE — 99999 PR PBB SHADOW E&M-EST. PATIENT-LVL III: CPT | Mod: PBBFAC,,, | Performed by: INTERNAL MEDICINE

## 2024-11-06 PROCEDURE — 99204 OFFICE O/P NEW MOD 45 MIN: CPT | Mod: S$PBB,,, | Performed by: INTERNAL MEDICINE

## 2024-11-06 NOTE — ASSESSMENT & PLAN NOTE
Minimal basilar changes but do not resolve on prone imaging.    No honeycombing to suggest probably UIP and so minimal that cannot really be characterized. Known asbestos exposure.  [facility]  -repeat HRCT in 1 year (6 months if significant restriction on PFTs)

## 2024-11-06 NOTE — PROGRESS NOTES
"Subjective:       Patient ID: Riky Salcedo is a 74 y.o. male.    Chief Complaint: interstitial fibrosis and Sleep Apnea    73 yo male with HTN, HLD, obesity who [facility] and [facility] as part of this. Also had PFTs but do not have access to these. He has muscle cramps but otherwisse no active complaints. No respiratory complaints. 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 Never smoker.  Review of Systems      Past Medical History:   Diagnosis Date    Cataract     Fever blister     HSV-1 (herpes simplex virus 1) infection     Hyperlipidemia     Hypertension     Keloid cicatrix     Low serum testosterone level     Obese     Peyronie's disease     Sleep apnea, unspecified         Family History   Problem Relation Name Age of Onset    Heart disease Mother      Heart disease Father      Hypertension Father      Depression Sister x1     No Known Problems Brother x1     No Known Problems Daughter x1     Diabetes Son x1     Heart disease Paternal Grandmother      Hypertension Paternal Grandmother      Colon cancer Neg Hx      Crohn's disease Neg Hx      Ulcerative colitis Neg Hx      Melanoma Neg Hx      Prostate cancer Neg Hx      Kidney disease Neg Hx        If not mentioned in HPI, Family history is reviewed and not contributory    Social History     Tobacco Use    Smoking status: Former     Types: Cigars    Smokeless tobacco: Never    Tobacco comments:     Smoked cigars at age 12-13, not for long   Substance Use Topics    Alcohol use: Not Currently     Alcohol/week: 12.0 - 16.0 standard drinks of alcohol     Types: 12 - 16 Cans of beer per week     Comment: 3-4 beers when he drinks, about 3-4 x per week    Drug use: No        Objective:        Vitals:    11/06/24 0906   BP: 138/82   Pulse: 74     Wt Readings from Last 3 Encounters:   11/06/24 106 kg (233 lb 11 oz)   07/22/24 106.5 " kg (234 lb 12.6 oz)   07/01/24 105.3 kg (232 lb 4.1 oz)     Temp Readings from Last 3 Encounters:   05/09/24 98.1 °F (36.7 °C)   02/28/24 97.3 °F (36.3 °C) (Temporal)   08/06/23 97.9 °F (36.6 °C) (Oral)     BP Readings from Last 3 Encounters:   11/06/24 138/82   07/22/24 131/79   07/01/24 110/60     Pulse Readings from Last 3 Encounters:   11/06/24 74   07/22/24 71   07/01/24 93       Physical Exam   Constitutional: He is oriented to person, place, and time. He appears well-developed and well-nourished. He is obese.   HENT:   Head: Normocephalic.   Mouth/Throat: Oropharynx is clear and moist.   Cardiovascular: Normal rate and regular rhythm.   Pulmonary/Chest: Normal expansion, symmetric chest wall expansion, effort normal and breath sounds normal. He has no wheezes.   Abdominal: Soft. He exhibits no distension.   Musculoskeletal:         General: No edema. Normal range of motion.   Lymphadenopathy: No supraclavicular adenopathy is present.     He has no cervical adenopathy.   Neurological: He is alert and oriented to person, place, and time. Gait normal.   Skin: Skin is warm and dry. No rash noted.   Psychiatric: He has a normal mood and affect. His behavior is normal. Thought content normal.   Vitals reviewed.    CBC  Lab Results   Component Value Date    WBC 7.28 05/14/2024    HGB 13.8 (L) 05/14/2024    HCT 43.3 05/14/2024    MCV 93 05/14/2024     05/14/2024         CMP  Sodium   Date Value Ref Range Status   05/14/2024 140 136 - 145 mmol/L Final     Potassium   Date Value Ref Range Status   05/14/2024 5.2 (H) 3.5 - 5.1 mmol/L Final     Chloride   Date Value Ref Range Status   05/14/2024 108 95 - 110 mmol/L Final     CO2   Date Value Ref Range Status   05/14/2024 19 (L) 23 - 29 mmol/L Final     Glucose   Date Value Ref Range Status   05/14/2024 101 70 - 110 mg/dL Final     BUN   Date Value Ref Range Status   05/14/2024 23 8 - 23 mg/dL Final     Creatinine   Date Value Ref Range Status   05/14/2024 1.1 0.5  "- 1.4 mg/dL Final     Calcium   Date Value Ref Range Status   05/14/2024 9.6 8.7 - 10.5 mg/dL Final     Total Protein   Date Value Ref Range Status   05/14/2024 6.9 6.0 - 8.4 g/dL Final     Albumin   Date Value Ref Range Status   05/14/2024 3.6 3.5 - 5.2 g/dL Final     Total Bilirubin   Date Value Ref Range Status   05/14/2024 0.2 0.1 - 1.0 mg/dL Final     Comment:     For infants and newborns, interpretation of results should be based  on gestational age, weight and in agreement with clinical  observations.    Premature Infant recommended reference ranges:  Up to 24 hours.............<8.0 mg/dL  Up to 48 hours............<12.0 mg/dL  3-5 days..................<15.0 mg/dL  6-29 days.................<15.0 mg/dL       Alkaline Phosphatase   Date Value Ref Range Status   05/14/2024 86 55 - 135 U/L Final     AST   Date Value Ref Range Status   05/14/2024 21 10 - 40 U/L Final     ALT   Date Value Ref Range Status   05/14/2024 26 10 - 44 U/L Final     Anion Gap   Date Value Ref Range Status   05/14/2024 13 8 - 16 mmol/L Final     eGFR   Date Value Ref Range Status   05/14/2024 >60.0 >60 mL/min/1.73 m^2 Final       ABG  No results found for: "PH", "PO2", "PCO2"        Personal Diagnostic Review  I have personally reviewed the following data and added my own interpretation as below:  [facility] and shows minimal basilar interlobular septal thickening which only partially resolves on prone imaging. No honeycombing, air trapping. Disease limited to the extreme gutters.  PCP note reivewed  Cardiology note reviewed  CBC, BMP reviwed      11/6/2024     9:06 AM 7/22/2024     8:56 AM 7/1/2024     1:03 PM 6/24/2024     9:59 AM 6/6/2024     1:46 PM 5/20/2024     3:40 PM 5/9/2024     2:51 PM   Pulmonary Function Tests   SpO2 96 %  96 % 96 %  96 % 95 %   Height  6' (1.829 m) 6' (1.829 m) 6' (1.829 m) 6' (1.829 m) 6' (1.829 m)    Weight 106 kg (233 lb 11 oz) 106.5 kg (234 lb 12.6 oz) 105.3 kg (232 lb " 4.1 oz) 106.6 kg (235 lb 0.2 oz) 104.6 kg (230 lb 8 oz) 105.2 kg (231 lb 14.4 oz)    BMI (Calculated)  31.8 31.5 31.9 31.3 31.4          Assessment:       1. ILD (interstitial lung disease)    2. Obesity (BMI 30.0-34.9)    3. Hypercholesteremia        Outpatient Encounter Medications as of 11/6/2024   Medication Sig Dispense Refill    lisinopriL (PRINIVIL,ZESTRIL) 40 MG tablet Take 1 tablet by mouth once daily 90 tablet 3    FIBER CHOICE ORAL Take by mouth.      multivitamin with minerals tablet Take 1 tablet by mouth once daily.      mupirocin (BACTROBAN) 2 % ointment Apply topically 3 (three) times daily. 15 g 3    omega-3 fatty acids/fish oil (FISH OIL-OMEGA-3 FATTY ACIDS) 300-1,000 mg capsule Take 1 capsule by mouth once daily.      sildenafiL (VIAGRA) 100 MG tablet Take 1 tablet (100 mg total) by mouth daily as needed for Erectile Dysfunction. Dispense generic (Patient not taking: Reported on 7/1/2024) 10 tablet 11    valACYclovir (VALTREX) 1000 MG tablet Take 1 tablet (1,000 mg total) by mouth 2 (two) times daily. for 7 days 14 tablet 0     No facility-administered encounter medications on file as of 11/6/2024.     1. ILD (interstitial lung disease)    2. Obesity (BMI 30.0-34.9)    3. Hypercholesteremia    Plan:     Problem List Items Addressed This Visit          Pulmonary    ILD (interstitial lung disease) - Primary    Current Assessment & Plan     Minimal basilar changes but do not resolve on prone imaging.    No honeycombing to suggest probably UIP and so minimal that cannot really be characterized. Known asbestos exposure.  [facility]  -repeat HRCT in 1 year (6 months if significant restriction on PFTs)            Cardiac/Vascular    Hypercholesteremia    Current Assessment & Plan     Not in pattern concerning for statin induced lung injury            Endocrine    Obesity (BMI 30.0-34.9)    Current Assessment & Plan     Can contribute to atelectasis in bases            Please  note Overview Notes are historic documentation. Please review A/P for current updates.  No follow-ups on file.    Future Appointments   Date Time Provider Department Center   11/11/2024  1:30 PM Ellen Mcclain MD [facility]   11/12/2024  7:15 AM SPECIMEN, [facility] [place]   11/12/2024  7:45 AM LAB, [facility] [place]   11/20/2024  3:30 PM Taj De La Vega MD [facility] [place]           Juan Miguel Garcia MD

## 2024-11-08 ENCOUNTER — PATIENT MESSAGE (OUTPATIENT)
Dept: SLEEP MEDICINE | Facility: CLINIC | Age: 74
End: 2024-11-08
Payer: MEDICARE

## 2024-11-11 ENCOUNTER — OFFICE VISIT (OUTPATIENT)
Dept: SLEEP MEDICINE | Facility: CLINIC | Age: 74
End: 2024-11-11
Attending: PSYCHIATRY & NEUROLOGY
Payer: MEDICARE

## 2024-11-11 VITALS
HEART RATE: 77 BPM | SYSTOLIC BLOOD PRESSURE: 125 MMHG | WEIGHT: 232.63 LBS | DIASTOLIC BLOOD PRESSURE: 77 MMHG | BODY MASS INDEX: 31.55 KG/M2

## 2024-11-11 DIAGNOSIS — G47.33 OSA (OBSTRUCTIVE SLEEP APNEA): Primary | ICD-10-CM

## 2024-11-11 PROCEDURE — 99213 OFFICE O/P EST LOW 20 MIN: CPT | Mod: PBBFAC | Performed by: PSYCHIATRY & NEUROLOGY

## 2024-11-11 PROCEDURE — 99214 OFFICE O/P EST MOD 30 MIN: CPT | Mod: S$PBB,,, | Performed by: PSYCHIATRY & NEUROLOGY

## 2024-11-11 PROCEDURE — 99999 PR PBB SHADOW E&M-EST. PATIENT-LVL III: CPT | Mod: PBBFAC,,, | Performed by: PSYCHIATRY & NEUROLOGY

## 2024-11-11 NOTE — PATIENT INSTRUCTIONS
"OA (oral appliance) for IRLANDA may be another treatment option for you;    CPAP    Pros: affordable through insurance, can be obtained directly through [facility],  the most effective to improve your sleep apnea down to normal, provides helpful feedback; built in voltage converter that allows to travel to [place].  Cons: bulky in comparison for a traveler; requires cleaning and periodic parts' replacement, usually takes  longer time  getting used to to find a perfect mask and CPAP settings.    ______________    Oral Appliance    Pros: Small, cosmetically pleasing, easy to travel with, nobody knows you are wearing it.  Cons: Often leaves some residual sleep apnea behind improving the number of breathing interruptions by 50% (so mild sleep apnea turns into normal, moderate sleep apnea turns into mild and severe sleep apnea turns into moderate); oral appliance is very likely to be much more expensive, hussle-full, requires  "dentist shopping",  Unreliable insurance coverage, longer time to fabricate  Oral appliance (cast needs to be sent to dental lab).   ______________________________________      For OA (oral appliance) for Obstructive sleep apnea (IRLANDA) please call         Dr. Nel GILLIS  [facility] [phone]   Dr. Deepak Mak [phone] [facility]     Or   Dr. Riky Vargas, DDS [phone] [phone]) [facility]    Or Dr. Abdon March [phone] - [facility]    Dr. Jose Forbes   [address] [place] [id number]  Phone: [phone]  Fax: [phone]  [email or web address]           " Comfort feeds: Pureed-honey consistency fluid, oral glucerna shake, ensure pudding bid

## 2024-11-11 NOTE — PROGRESS NOTES
2024     1:16 PM 2024     1:46 PM 3/5/2024    10:56 AM   EPWORTH SLEEPINESS SCALE TOTAL SCORE    Total score 8  4 7       Patient-reported       Riky Salcedo is a 74 y.o. male seen today for CPAP follow up. Last seen on 3/5/2024.    Recalcified for overall mild IRLANDA in 2024; AHI 6. Although not enough supine sleep.    Still feels the difference - more alertness on the days when he used machine the night before.  He was wondering about Inspire.  Still uncomfortable using full face mask - leaking; tried multiple other masks before.  Using hearing aids for tinnitus.   10/12/2024 - 11/10/2024 Patient ID: [id number] : 1950 Age: 74 years Gender: Male [facility] [address] [facility] [address] [place], [address] Compliance Report Compliance Payor Standard Usage 10/12/2024 - 11/10/2024 Usage days 20/30 days (67%) >= 4 hours 18 days (60%) < 4 hours 2 days (7%) Usage hours 115 hours 26 minutes Average usage (total days) 3 hours 51 minutes Average usage (days used) 5 hours 46 minutes Median usage (days used) 5 hours 46 minutes Total used hours (value since last reset - 11/10/2024) 1,125 hours AirSense 11 AutoSet Serial number [id number] Mode AutoSet Min Pressure 5 cmH2O Max Pressure 10 cmH2O EPR Fulltime EPR level 3 Response Soft Therapy Pressure - cmH2O Median: 6.4 95th percentile: 9.1 Maximum: 9.6 Leaks - L/min Median: 10.7 95th percentile: 28.1 Maximum: 49.0 Events per hour AI: 0.7 HI: 0.2 AHI: 0.9 Apnea Index Central: 0.3 Obstructive: 0.3 Unknown: 0.1 RERA Index 0.3 Cheyne-Stevenson respiration (average duration per night) 0 minutes (0%)    DME: [facility] got machine in 2024              He has improved his mask on time, cpap 5-10cm. When pressure higher mask flubbers onand off face. Strap marks if tightens it too much. Pressure gets strong at times and is pounding so he has to turn machine off then on again. Using f30im ask. Using nose strips to ensure more nasal patency and keep mouth closed.      Interrogation/remote review 30davg 6.4h/n AHI 1.0,. 90% tile 8.2cm.      + nasal congewstion + has deviated septus.    Sleep studies:   PSG 1/2024: AHI 6.5/ lowest oxygen saturation during sleep was 88.0%      ASSESSMENT:  1. IRLANDA. Adherent with cpap, benefits resolution of daytime sleepiness. ESs<5. Having some pressure intolerance        PLAN:    Alternative treatment options for IRLANDA were discussed - not a candidate for Inspire due to milder severity;  OA (oral appliance) for IRLANDA was also discussed.  Continue APAP 5-10cm. Snug up lower part of mask, consider BIPAP titration study/notify me if pressure still too strong/having to re-ramp or turn off/on again  DME THS supplies. Discussed effectiveness of therapy when mask on  Rtc 5-6 mos re-eval

## 2024-11-12 ENCOUNTER — LAB VISIT (OUTPATIENT)
Dept: LAB | Facility: HOSPITAL | Age: 74
End: 2024-11-12
Attending: FAMILY MEDICINE
Payer: MEDICARE

## 2024-11-12 DIAGNOSIS — R73.03 PREDIABETES: ICD-10-CM

## 2024-11-12 DIAGNOSIS — D53.9 NUTRITIONAL ANEMIA: ICD-10-CM

## 2024-11-12 DIAGNOSIS — I10 PRIMARY HYPERTENSION: ICD-10-CM

## 2024-11-12 DIAGNOSIS — F10.19 ALCOHOL ABUSE WITH ALCOHOL-INDUCED DISORDER: ICD-10-CM

## 2024-11-12 LAB
ALBUMIN SERPL BCP-MCNC: 3.5 G/DL (ref 3.5–5.2)
ALP SERPL-CCNC: 83 U/L (ref 40–150)
ALT SERPL W/O P-5'-P-CCNC: 18 U/L (ref 10–44)
ANION GAP SERPL CALC-SCNC: 9 MMOL/L (ref 8–16)
AST SERPL-CCNC: 18 U/L (ref 10–40)
BASOPHILS # BLD AUTO: 0.07 K/UL (ref 0–0.2)
BASOPHILS NFR BLD: 0.8 % (ref 0–1.9)
BILIRUB SERPL-MCNC: 0.3 MG/DL (ref 0.1–1)
BUN SERPL-MCNC: 21 MG/DL (ref 8–23)
CALCIUM SERPL-MCNC: 9 MG/DL (ref 8.7–10.5)
CHLORIDE SERPL-SCNC: 109 MMOL/L (ref 95–110)
CHOLEST SERPL-MCNC: 211 MG/DL (ref 120–199)
CHOLEST/HDLC SERPL: 4.3 {RATIO} (ref 2–5)
CO2 SERPL-SCNC: 23 MMOL/L (ref 23–29)
CREAT SERPL-MCNC: 1 MG/DL (ref 0.5–1.4)
DIFFERENTIAL METHOD BLD: ABNORMAL
EOSINOPHIL # BLD AUTO: 0.2 K/UL (ref 0–0.5)
EOSINOPHIL NFR BLD: 2.2 % (ref 0–8)
ERYTHROCYTE [DISTWIDTH] IN BLOOD BY AUTOMATED COUNT: 13.1 % (ref 11.5–14.5)
EST. GFR  (NO RACE VARIABLE): >60 ML/MIN/1.73 M^2
ESTIMATED AVG GLUCOSE: 111 MG/DL (ref 68–131)
GLUCOSE SERPL-MCNC: 102 MG/DL (ref 70–110)
HBA1C MFR BLD: 5.5 % (ref 4–5.6)
HCT VFR BLD AUTO: 44 % (ref 40–54)
HDLC SERPL-MCNC: 49 MG/DL (ref 40–75)
HDLC SERPL: 23.2 % (ref 20–50)
HGB BLD-MCNC: 13.8 G/DL (ref 14–18)
IMM GRANULOCYTES # BLD AUTO: 0.03 K/UL (ref 0–0.04)
IMM GRANULOCYTES NFR BLD AUTO: 0.4 % (ref 0–0.5)
LDLC SERPL CALC-MCNC: 141 MG/DL (ref 63–159)
LYMPHOCYTES # BLD AUTO: 2.4 K/UL (ref 1–4.8)
LYMPHOCYTES NFR BLD: 28.4 % (ref 18–48)
MCH RBC QN AUTO: 30.2 PG (ref 27–31)
MCHC RBC AUTO-ENTMCNC: 31.4 G/DL (ref 32–36)
MCV RBC AUTO: 96 FL (ref 82–98)
MONOCYTES # BLD AUTO: 0.7 K/UL (ref 0.3–1)
MONOCYTES NFR BLD: 8.2 % (ref 4–15)
NEUTROPHILS # BLD AUTO: 5 K/UL (ref 1.8–7.7)
NEUTROPHILS NFR BLD: 60 % (ref 38–73)
NONHDLC SERPL-MCNC: 162 MG/DL
NRBC BLD-RTO: 0 /100 WBC
PLATELET # BLD AUTO: 206 K/UL (ref 150–450)
PMV BLD AUTO: 11.8 FL (ref 9.2–12.9)
POTASSIUM SERPL-SCNC: 5.1 MMOL/L (ref 3.5–5.1)
PROT SERPL-MCNC: 6.9 G/DL (ref 6–8.4)
RBC # BLD AUTO: 4.57 M/UL (ref 4.6–6.2)
SODIUM SERPL-SCNC: 141 MMOL/L (ref 136–145)
TRIGL SERPL-MCNC: 105 MG/DL (ref 30–150)
TSH SERPL DL<=0.005 MIU/L-ACNC: 2.29 UIU/ML (ref 0.4–4)
WBC # BLD AUTO: 8.28 K/UL (ref 3.9–12.7)

## 2024-11-12 PROCEDURE — 85025 COMPLETE CBC W/AUTO DIFF WBC: CPT | Performed by: FAMILY MEDICINE

## 2024-11-12 PROCEDURE — 80053 COMPREHEN METABOLIC PANEL: CPT | Performed by: FAMILY MEDICINE

## 2024-11-12 PROCEDURE — 84443 ASSAY THYROID STIM HORMONE: CPT | Performed by: FAMILY MEDICINE

## 2024-11-12 PROCEDURE — 83036 HEMOGLOBIN GLYCOSYLATED A1C: CPT | Performed by: FAMILY MEDICINE

## 2024-11-12 PROCEDURE — 80061 LIPID PANEL: CPT | Performed by: FAMILY MEDICINE

## 2024-11-12 PROCEDURE — 36415 COLL VENOUS BLD VENIPUNCTURE: CPT | Mod: PO | Performed by: FAMILY MEDICINE

## 2024-11-20 ENCOUNTER — OFFICE VISIT (OUTPATIENT)
Dept: FAMILY MEDICINE | Facility: CLINIC | Age: 74
End: 2024-11-20
Payer: MEDICARE

## 2024-11-20 VITALS
HEART RATE: 81 BPM | BODY MASS INDEX: 31.65 KG/M2 | WEIGHT: 233.69 LBS | SYSTOLIC BLOOD PRESSURE: 136 MMHG | OXYGEN SATURATION: 97 % | HEIGHT: 72 IN | DIASTOLIC BLOOD PRESSURE: 76 MMHG

## 2024-11-20 DIAGNOSIS — I10 ESSENTIAL HYPERTENSION: ICD-10-CM

## 2024-11-20 DIAGNOSIS — I10 PRIMARY HYPERTENSION: Primary | ICD-10-CM

## 2024-11-20 DIAGNOSIS — Z77.090 ASBESTOS EXPOSURE: ICD-10-CM

## 2024-11-20 DIAGNOSIS — E66.811 CLASS 1 OBESITY WITH BODY MASS INDEX (BMI) OF 31.0 TO 31.9 IN ADULT, UNSPECIFIED OBESITY TYPE, UNSPECIFIED WHETHER SERIOUS COMORBIDITY PRESENT: ICD-10-CM

## 2024-11-20 DIAGNOSIS — E78.5 HYPERLIPIDEMIA, UNSPECIFIED HYPERLIPIDEMIA TYPE: ICD-10-CM

## 2024-11-20 DIAGNOSIS — R73.03 PREDIABETES: ICD-10-CM

## 2024-11-20 DIAGNOSIS — Z78.9 STATIN INTOLERANCE: ICD-10-CM

## 2024-11-20 PROCEDURE — 99215 OFFICE O/P EST HI 40 MIN: CPT | Mod: S$PBB,,, | Performed by: FAMILY MEDICINE

## 2024-11-20 PROCEDURE — 99213 OFFICE O/P EST LOW 20 MIN: CPT | Mod: PBBFAC,PO | Performed by: FAMILY MEDICINE

## 2024-11-20 PROCEDURE — 99999 PR PBB SHADOW E&M-EST. PATIENT-LVL III: CPT | Mod: PBBFAC,,, | Performed by: FAMILY MEDICINE

## 2024-11-20 RX ORDER — LISINOPRIL 40 MG/1
40 TABLET ORAL DAILY
Qty: 90 TABLET | Refills: 3 | Status: SHIPPED | OUTPATIENT
Start: 2024-11-20

## 2024-11-20 NOTE — PROGRESS NOTES
Subjective     Patient ID: Riky Salcedo is a 74 y.o. male.    Chief Complaint: Hypertension and Hyperlipidemia    74 years old male came to the clinic for blood pressure check.  Blood pressure today was stable.  Last A1c was normal.  Patient with elevated cholesterol.  He was not able to tolerate statin therapy.  Previous cardiovascular workup were normal.  Patient with a BMI of 31 currently trying to lose weight.  He reports previous exposure to asbestos with normal pulmonary testing.    Hypertension  This is a chronic problem. The current episode started more than 1 year ago. The problem is unchanged. The problem is controlled. Pertinent negatives include no chest pain, orthopnea, palpitations or peripheral edema. There are no associated agents to hypertension. Risk factors for coronary artery disease include male gender and obesity. Past treatments include ACE inhibitors. The current treatment provides significant improvement. Compliance problems include exercise.  There is no history of angina. There is no history of a hypertension causing med or a thyroid problem.   Hyperlipidemia  This is a chronic problem. The problem is uncontrolled. Recent lipid tests were reviewed and are high. Exacerbating diseases include obesity. Pertinent negatives include no chest pain or myalgias. He is currently on no antihyperlipidemic treatment. The current treatment provides moderate improvement of lipids. Compliance problems include adherence to exercise and adherence to diet.  Risk factors for coronary artery disease include a sedentary lifestyle, hypertension, male sex and obesity.     Review of Systems   Constitutional: Negative.    HENT: Negative.     Eyes: Negative.    Respiratory: Negative.     Cardiovascular: Negative.  Negative for chest pain, palpitations, orthopnea, leg swelling and claudication.   Gastrointestinal: Negative.    Genitourinary: Negative.    Musculoskeletal: Negative.  Negative for myalgias.    Integumentary:  Negative.   Neurological: Negative.    Psychiatric/Behavioral: Negative.            Objective     Physical Exam  Vitals and nursing note reviewed.   Constitutional:       General: He is not in acute distress.     Appearance: He is well-developed. He is not diaphoretic.   HENT:      Head: Normocephalic and atraumatic.      Right Ear: External ear normal.      Left Ear: External ear normal.      Nose: Nose normal.      Mouth/Throat:      Pharynx: No oropharyngeal exudate.   Eyes:      General: No scleral icterus.        Right eye: No discharge.         Left eye: No discharge.      Conjunctiva/sclera: Conjunctivae normal.      Pupils: Pupils are equal, round, and reactive to light.   Neck:      Thyroid: No thyromegaly.      Vascular: No JVD.      Trachea: No tracheal deviation.   Cardiovascular:      Rate and Rhythm: Normal rate and regular rhythm.      Heart sounds: Normal heart sounds. No murmur heard.     No friction rub. No gallop.   Pulmonary:      Effort: Pulmonary effort is normal. No respiratory distress.      Breath sounds: Normal breath sounds. No stridor. No wheezing or rales.   Chest:      Chest wall: No tenderness.   Abdominal:      General: Bowel sounds are normal. There is no distension.      Palpations: Abdomen is soft. There is no mass.      Tenderness: There is no abdominal tenderness. There is no guarding or rebound.   Musculoskeletal:         General: No tenderness. Normal range of motion.      Cervical back: Normal range of motion and neck supple.   Lymphadenopathy:      Cervical: No cervical adenopathy.   Skin:     General: Skin is warm and dry.      Coloration: Skin is not pale.      Findings: No erythema or rash.   Neurological:      Mental Status: He is alert and oriented to person, place, and time.      Cranial Nerves: No cranial nerve deficit.      Motor: No abnormal muscle tone.      Coordination: Coordination normal.      Deep Tendon Reflexes: Reflexes are normal and  symmetric. Reflexes normal.   Psychiatric:         Behavior: Behavior normal.         Thought Content: Thought content normal.         Judgment: Judgment normal.            Assessment and Plan     1. Primary hypertension  -     Urinalysis; Future  -     Comprehensive Metabolic Panel; Future; Expected date: 11/20/2024  -     Lipid Panel; Future; Expected date: 11/20/2024  -     TSH; Future; Expected date: 11/20/2024  -     CBC Auto Differential; Future; Expected date: 11/20/2024    2. Asbestos exposure    3. Hyperlipidemia, unspecified hyperlipidemia type  -     Comprehensive Metabolic Panel; Future; Expected date: 11/20/2024  -     Lipid Panel; Future; Expected date: 11/20/2024    4. Prediabetes  -     Hemoglobin A1C; Future; Expected date: 11/20/2024    5. Statin intolerance    6. Essential hypertension  -     lisinopriL (PRINIVIL,ZESTRIL) 40 MG tablet; Take 1 tablet (40 mg total) by mouth once daily.  Dispense: 90 tablet; Refill: 3    7. Class 1 obesity with body mass index (BMI) of 31.0 to 31.9 in adult, unspecified obesity type, unspecified whether serious comorbidity present       Diet and physical activity to promote weight loss.   Continue monitoring blood pressure at home, low sodium diet.   I spent a total of 42 minutes on the day of the visit.This includes face to face time and non-face to face time preparing to see the patient (eg, review of tests), obtaining and/or reviewing separately obtained history, documenting clinical information in the electronic or other health record, independently interpreting results and communicating results to the patient/family/caregiver, or care coordinator.        Follow up if symptoms worsen or fail to improve.

## 2024-11-24 ENCOUNTER — OFFICE VISIT (OUTPATIENT)
Dept: URGENT CARE | Facility: CLINIC | Age: 74
End: 2024-11-24
Payer: MEDICARE

## 2024-11-24 VITALS
WEIGHT: 233 LBS | OXYGEN SATURATION: 98 % | SYSTOLIC BLOOD PRESSURE: 126 MMHG | HEART RATE: 78 BPM | RESPIRATION RATE: 17 BRPM | BODY MASS INDEX: 31.56 KG/M2 | HEIGHT: 72 IN | TEMPERATURE: 99 F | DIASTOLIC BLOOD PRESSURE: 67 MMHG

## 2024-11-24 DIAGNOSIS — L25.9 CONTACT DERMATITIS, UNSPECIFIED CONTACT DERMATITIS TYPE, UNSPECIFIED TRIGGER: Primary | ICD-10-CM

## 2024-11-24 PROCEDURE — 99213 OFFICE O/P EST LOW 20 MIN: CPT | Mod: S$GLB,,, | Performed by: FAMILY MEDICINE

## 2024-11-24 RX ORDER — TRIAMCINOLONE ACETONIDE 1 MG/G
OINTMENT TOPICAL 2 TIMES DAILY
Qty: 30 G | Refills: 0 | Status: SHIPPED | OUTPATIENT
Start: 2024-11-24

## 2024-11-24 NOTE — PROGRESS NOTES
Subjective:      Patient ID: Riky Salcedo is a 74 y.o. male.    Vitals:  height is 6' (1.829 m) and weight is 105.7 kg (233 lb). His oral temperature is 98.7 °F (37.1 °C). His blood pressure is 126/67 and his pulse is 78. His respiration is 17 and oxygen saturation is 98%.     Chief Complaint: Rash (Possible cellulitis on right ankle, thought it was a insect bite, red and spreading - Entered by patient)    This is a 74 y.o. male who presents today with a chief complaint of rash on right ankle, started three days ago after he was doing yard work at home.    Rash  This is a new problem. The current episode started in the past 7 days. The problem is unchanged. The rash is diffuse. The rash is characterized by blistering, bruising, itchiness and redness. He was exposed to nothing. Pertinent negatives include no anorexia, congestion, cough, diarrhea, eye pain, facial edema, fatigue, fever, joint pain or nail changes.       Constitution: Negative for fatigue and fever.   HENT:  Negative for congestion.    Eyes:  Negative for eye pain.   Respiratory:  Negative for cough.    Gastrointestinal:  Negative for diarrhea.   Skin:  Positive for rash.      Objective:     Physical Exam   Constitutional: He is oriented to person, place, and time. He appears well-developed. He is cooperative.   HENT:   Head: Normocephalic and atraumatic.   Ears:   Right Ear: Hearing, tympanic membrane, external ear and ear canal normal.   Left Ear: Hearing, tympanic membrane, external ear and ear canal normal.   Nose: Nose normal. No mucosal edema or nasal deformity. No epistaxis. Right sinus exhibits no maxillary sinus tenderness and no frontal sinus tenderness. Left sinus exhibits no maxillary sinus tenderness and no frontal sinus tenderness.   Mouth/Throat: Uvula is midline, oropharynx is clear and moist and mucous membranes are normal. No trismus in the jaw. Normal dentition. No uvula swelling.   Eyes: Conjunctivae and lids are normal.   Neck:  Trachea normal and phonation normal. Neck supple.   Cardiovascular: Normal rate, regular rhythm, normal heart sounds and normal pulses.   Pulmonary/Chest: Effort normal and breath sounds normal.   Abdominal: Normal appearance and bowel sounds are normal. Soft.   Musculoskeletal: Normal range of motion.         General: Normal range of motion.   Neurological: He is alert and oriented to person, place, and time. He exhibits normal muscle tone.   Skin: Skin is warm, dry, intact and rash.   Psychiatric: His speech is normal and behavior is normal. Judgment and thought content normal.   Nursing note and vitals reviewed.      Assessment:     1. Contact dermatitis, unspecified contact dermatitis type, unspecified trigger        Plan:       Contact dermatitis, unspecified contact dermatitis type, unspecified trigger  -     triamcinolone acetonide 0.1% (KENALOG) 0.1 % ointment; Apply topically 2 (two) times daily.  Dispense: 30 g; Refill: 0        Thank you for choosing [facility]!     Our goal in the Urgent Care is to always provide outstanding medical care. You may receive a survey by mail or e-mail in the next week regarding your experience today. We would greatly appreciate you completing and returning the survey. Your feedback provides us with a way to recognize our staff who provide very good care, and it helps us learn how to improve when your experience was below our aspiration of excellence.       We appreciate you trusting us with your medical care. We hope you feel better soon. We will be happy to take care of you for all of your future medical needs.  You must understand that you've received an Urgent Care treatment only and that you may be released before all your medical problems are known or treated. You, the patient, will arrange for follow up care as instructed.  Follow up with your PCP or specialty clinic as directed in the next 1-2 weeks if not improved or as needed.  You can call [phone]  to schedule an appointment with the appropriate provider.  Another option is to follow up with [facility] ([email or web address]) virtually for quick simple medical advice.  If your condition worsens we recommend that you receive another evaluation at the emergency room immediately or contact your primary medical clinics after hours call service to discuss your concerns.  Please return here or go to the Emergency Department for any concerns or worsening of condition.      *If you were prescribed a narcotic or controlled medication, do not drive or operate heavy equipment or machinery while taking these medications.

## 2025-02-03 ENCOUNTER — OFFICE VISIT (OUTPATIENT)
Dept: ORTHOPEDICS | Facility: CLINIC | Age: 75
End: 2025-02-03
Payer: OTHER GOVERNMENT

## 2025-02-03 ENCOUNTER — HOSPITAL ENCOUNTER (OUTPATIENT)
Dept: RADIOLOGY | Facility: HOSPITAL | Age: 75
Discharge: HOME | End: 2025-02-03
Attending: SURGERY
Payer: MEDICARE

## 2025-02-03 VITALS — HEIGHT: 72 IN | WEIGHT: 233 LBS | BODY MASS INDEX: 31.56 KG/M2

## 2025-02-03 DIAGNOSIS — M79.641 RIGHT HAND PAIN: ICD-10-CM

## 2025-02-03 DIAGNOSIS — L08.9 INFECTED ABRASION OF RIGHT HAND, INITIAL ENCOUNTER: Primary | ICD-10-CM

## 2025-02-03 DIAGNOSIS — S60.511A INFECTED ABRASION OF RIGHT HAND, INITIAL ENCOUNTER: Primary | ICD-10-CM

## 2025-02-03 PROCEDURE — 73130 X-RAY EXAM OF HAND: CPT | Mod: TC,PN,RT

## 2025-02-03 PROCEDURE — 99999 PR PBB SHADOW E&M-EST. PATIENT-LVL III: CPT | Mod: PBBFAC,,, | Performed by: SURGERY

## 2025-02-03 PROCEDURE — 99213 OFFICE O/P EST LOW 20 MIN: CPT | Mod: PBBFAC,25,PN | Performed by: SURGERY

## 2025-02-03 PROCEDURE — 73130 X-RAY EXAM OF HAND: CPT | Mod: 26,RT,, | Performed by: RADIOLOGY

## 2025-02-03 PROCEDURE — 99204 OFFICE O/P NEW MOD 45 MIN: CPT | Mod: S$PBB,,, | Performed by: SURGERY

## 2025-02-03 RX ORDER — SULFAMETHOXAZOLE AND TRIMETHOPRIM 800; 160 MG/1; MG/1
1 TABLET ORAL 2 TIMES DAILY
Qty: 10 TABLET | Refills: 0 | Status: SHIPPED | OUTPATIENT
Start: 2025-02-03

## 2025-02-03 NOTE — PROGRESS NOTES
SUBJECTIVE:    Mr. Salcedo is here today for right hand pain after fall.  Patient states while taking out the trash the of the night on 01/30/2025 he fell and jammed his hand against a trash can and the concrete.  Patient states his hand started to swell with superficial abrasions along his 2nd and 3rd knuckle.  Patient stated his daughter is a nurse who has been applying wound care to the area.  Patient states no previous injuries.    OBJECTIVE:      Vitals:    02/03/25 1033 02/03/25 1034   Weight: 105.7 kg (233 lb 0.4 oz)    Height: 6' (1.829 m)    PainSc:    2       ORTHOPEDIC EXAM:  Mild edema noted on the 2nd and 3rd distal metacarpal.  Superficial skin abrasion noted on the 2nd PIP and 3rd PIP.  No ecchymosis or erythema.  Limited finger flexion due to swelling.  Able to fully extend.  Cap refill noted neurovascularly intact.  5/5 strength  Wounds do not probe to bone.  Minimal erythema, drainage.  DIAGNOSTIC STUDIES:  Right hand taken in clinic today.  Right hand osteoarthritis noted, no fracture or dislocation noted.  I have personally reviewed and interpreted the findings with the patient today.    ASSESSMENT:   1. Fall  2. Hand pain, right  3. Multiple abrasions of the hand  4. Hand infection,      PLAN:  Riky was seen today for injury.    Diagnoses and all orders for this visit:    Right hand pain  -     X-Ray Hand Complete Right; Future        We will prescribe antibiotics with the superficial wounds.  Bactrim times five days. We discussed warning signs for which to present to the emergency room immediately.  We will monitor his response to antibiotics.  He is going to follow up later this week for 1 of our hand specialists for continued clinical monitoring as well as to determine whether this requires a I and D should his condition worsen.    Donnie Zaman MD  [facility]  Orthopedic Surgery      This note was done with voice recognition software. Please excuse any errors missed in proof  reading.

## 2025-02-06 ENCOUNTER — OFFICE VISIT (OUTPATIENT)
Dept: ORTHOPEDICS | Facility: CLINIC | Age: 75
End: 2025-02-06
Payer: OTHER GOVERNMENT

## 2025-02-06 VITALS — HEIGHT: 72 IN | BODY MASS INDEX: 31.6 KG/M2

## 2025-02-06 DIAGNOSIS — S61.411D LACERATION OF RIGHT HAND WITHOUT FOREIGN BODY, SUBSEQUENT ENCOUNTER: Primary | ICD-10-CM

## 2025-02-06 PROBLEM — S61.411A LACERATION OF RIGHT HAND WITHOUT FOREIGN BODY: Status: ACTIVE | Noted: 2025-02-06

## 2025-02-06 PROCEDURE — 99213 OFFICE O/P EST LOW 20 MIN: CPT | Mod: PBBFAC,PN | Performed by: ORTHOPAEDIC SURGERY

## 2025-02-06 PROCEDURE — 99213 OFFICE O/P EST LOW 20 MIN: CPT | Mod: S$PBB,,, | Performed by: ORTHOPAEDIC SURGERY

## 2025-02-06 PROCEDURE — 99999 PR PBB SHADOW E&M-EST. PATIENT-LVL III: CPT | Mod: PBBFAC,,, | Performed by: ORTHOPAEDIC SURGERY

## 2025-02-06 RX ORDER — CEPHALEXIN 500 MG/1
500 CAPSULE ORAL 4 TIMES DAILY
Qty: 40 CAPSULE | Refills: 1 | Status: SHIPPED | OUTPATIENT
Start: 2025-02-06

## 2025-02-06 NOTE — PROGRESS NOTES
Subjective:      Patient ID: Riky Salcedo is a 74 y.o. male.  Chief Complaint: Consult (R MF and LF)      HPI  Riky Salcedo is a  74 y.o. male presenting today for follow up of laceration he sustained to fingers of the right hand proximally 1 week ago.  He reports that he is improving slowly currently on Bactrim.    Review of patient's allergies indicates:   Allergen Reactions    Atenolol Other (See Comments)    Clindamycin Itching and Hives    Statins-hmg-coa reductase inhibitors Palpitations         Current Outpatient Medications   Medication Sig Dispense Refill    FIBER CHOICE ORAL Take by mouth.      lisinopriL (PRINIVIL,ZESTRIL) 40 MG tablet Take 1 tablet (40 mg total) by mouth once daily. 90 tablet 3    multivitamin with minerals tablet Take 1 tablet by mouth once daily.      sulfamethoxazole-trimethoprim 800-160mg (BACTRIM DS) 800-160 mg Tab Take 1 tablet by mouth 2 (two) times daily. 10 tablet 0    triamcinolone acetonide 0.1% (KENALOG) 0.1 % ointment Apply topically 2 (two) times daily. 30 g 0    cephALEXin (KEFLEX) 500 MG capsule Take 1 capsule (500 mg total) by mouth 4 (four) times daily. 40 capsule 1    mupirocin (BACTROBAN) 2 % ointment Apply topically 3 (three) times daily. (Patient not taking: Reported on 2/6/2025) 15 g 3    omega-3 fatty acids/fish oil (FISH OIL-OMEGA-3 FATTY ACIDS) 300-1,000 mg capsule Take 1 capsule by mouth once daily. (Patient not taking: Reported on 2/6/2025)      sildenafiL (VIAGRA) 100 MG tablet Take 1 tablet (100 mg total) by mouth daily as needed for Erectile Dysfunction. Dispense generic 10 tablet 11    valACYclovir (VALTREX) 1000 MG tablet Take 1 tablet (1,000 mg total) by mouth 2 (two) times daily. for 7 days (Patient not taking: Reported on 11/20/2024) 14 tablet 0     No current facility-administered medications for this visit.       Past Medical History:   Diagnosis Date    Cataract     Fever blister     HSV-1 (herpes simplex virus 1) infection      Hyperlipidemia     Hypertension     Keloid cicatrix     Low serum testosterone level     Obese     Peyronie's disease     Sleep apnea, unspecified        Past Surgical History:   Procedure Laterality Date    COLONOSCOPY N/A 8/5/2016    Procedure: COLONOSCOPY;  Surgeon: Denis Herzog MD;  Location: [facility] (4TH FLR);  Service: Endoscopy;  Laterality: N/A;  has hemorrhoid that may need to be banded    COLONOSCOPY N/A 7/29/2021    Procedure: COLONOSCOPY;  Surgeon: Deejay Corley MD;  [facility]  Service: Endoscopy;  Laterality: N/A;  Pt. is fully vaccinated.EC    COLONOSCOPY N/A 5/9/2024    Procedure: COLONOSCOPY;  Surgeon: Eriberto Garcia MD;  [facility];  Service: Endoscopy;  Laterality: N/A;  4/1/24-Dr. Garcia pt, 5-12wks, PEG, instr portal-DS  5/2/24- lvm and portal msg for pc. DBM  5/3/24- pc complete. DBM    HERNIA REPAIR      LIPOMA RESECTION N/A 11/30/2022    Procedure: EXCISION, LIPOMA Back;  Surgeon: Clayton Madrid MD;  [facility];  Service: General;  Laterality: N/A;    UMBILICAL HERNIA REPAIR         OBJECTIVE:   PHYSICAL EXAM:  Height: 6' (182.9 cm)    Vitals:    02/06/25 1310   Height: 6' (1.829 m)   PainSc:   1   PainLoc: Finger     Ortho/SPM Exam  Examination right hand he does have dorsal lacerations particularly over the right middle finger just proximal to the PIP joint   Some mild redness minimal drainage no fluctuance no purulence    RADIOGRAPHS:  None  Comments: I have personally reviewed the imaging and I agree with the above radiologist's report.    ASSESSMENT/PLAN:     IMPRESSION:  Infected laceration right middle finger dorsal    PLAN:  Although I do not think this needs surgery I have switched antibiotics to Keflex 500 q.i.d.   recommended some gentle soap and water and Xeroform gauze       FOLLOW UP:  1 week for close follow-up    Disclaimer: This note has been generated using voice-recognition software. There may be  typographical errors that have been missed during proof-reading.

## 2025-02-10 NOTE — PROGRESS NOTES
Subjective:      Patient ID: Riky Salcedo is a 74 y.o. male.  Chief Complaint: No chief complaint on file.    HPI  Riky Salcedo is a  74 y.o. male presenting today for follow up of laceration he sustained to fingers of the right hand proximally.      Seen by Dr. Cueva about 5 days ago and switched from Bactrim to Keflex antibiotic.  The patient has been completing twice daily wound cleansing and dry dressing changes.  He notes that his pain, swelling and hand redness have greatly improved.    Denies fevers, chills and nausea.    Review of patient's allergies indicates:   Allergen Reactions    Atenolol Other (See Comments)    Clindamycin Itching and Hives    Statins-hmg-coa reductase inhibitors Palpitations         Current Outpatient Medications   Medication Sig Dispense Refill    cephALEXin (KEFLEX) 500 MG capsule Take 1 capsule (500 mg total) by mouth 4 (four) times daily. 40 capsule 1    FIBER CHOICE ORAL Take by mouth.      lisinopriL (PRINIVIL,ZESTRIL) 40 MG tablet Take 1 tablet (40 mg total) by mouth once daily. 90 tablet 3    multivitamin with minerals tablet Take 1 tablet by mouth once daily.      mupirocin (BACTROBAN) 2 % ointment Apply topically 3 (three) times daily. (Patient not taking: Reported on 2/6/2025) 15 g 3    omega-3 fatty acids/fish oil (FISH OIL-OMEGA-3 FATTY ACIDS) 300-1,000 mg capsule Take 1 capsule by mouth once daily. (Patient not taking: Reported on 2/6/2025)      sildenafiL (VIAGRA) 100 MG tablet Take 1 tablet (100 mg total) by mouth daily as needed for Erectile Dysfunction. Dispense generic 10 tablet 11    sulfamethoxazole-trimethoprim 800-160mg (BACTRIM DS) 800-160 mg Tab Take 1 tablet by mouth 2 (two) times daily. 10 tablet 0    triamcinolone acetonide 0.1% (KENALOG) 0.1 % ointment Apply topically 2 (two) times daily. 30 g 0    valACYclovir (VALTREX) 1000 MG tablet Take 1 tablet (1,000 mg total) by mouth 2 (two) times daily. for 7 days (Patient not taking: Reported on  11/20/2024) 14 tablet 0     No current facility-administered medications for this visit.       Past Medical History:   Diagnosis Date    Cataract     Fever blister     HSV-1 (herpes simplex virus 1) infection     Hyperlipidemia     Hypertension     Keloid cicatrix     Low serum testosterone level     Obese     Peyronie's disease     Sleep apnea, unspecified        Past Surgical History:   Procedure Laterality Date    COLONOSCOPY N/A 8/5/2016    Procedure: COLONOSCOPY;  Surgeon: Denis Herzog MD;  Location: [facility] (4TH FLR);  Service: Endoscopy;  Laterality: N/A;  has hemorrhoid that may need to be banded    COLONOSCOPY N/A 7/29/2021    Procedure: COLONOSCOPY;  Surgeon: Deejay Corley MD;  Location: [facility] ENDO (4TH FLR);  Service: Endoscopy;  Laterality: N/A;  Pt. is fully vaccinated.EC    COLONOSCOPY N/A 5/9/2024    Procedure: COLONOSCOPY;  Surgeon: Eriberto Garcia MD;  Location: [facility] ENDOSCOPY;  Service: Endoscopy;  Laterality: N/A;  4/1/24-Dr. Garcia pt, 5-12wks, PEG, instr portal-DS  5/2/24- lvm and portal msg for pc. DBM  5/3/24- pc complete. DBM    HERNIA REPAIR      LIPOMA RESECTION N/A 11/30/2022    Procedure: EXCISION, LIPOMA Back;  Surgeon: Clayton Madrid MD;  Location: [facility] OR 2ND FLR;  Service: General;  Laterality: N/A;    UMBILICAL HERNIA REPAIR         OBJECTIVE:   PHYSICAL EXAM:       There were no vitals filed for this visit.    Ortho/SPM Exam  Examination right hand   (+) dorsal lacerations particularly over the right middle finger just proximal to the PIP joint   Swelling continues to improve.  No active drainage or purulence.  Minimally tender to palpation throughout.  Patient able to demonstrate near full range of motion to all digits with guarding of the middle digit to flexion.  Brisk cap refill to finger pads.        ASSESSMENT/PLAN:     IMPRESSION:  Infected laceration right middle finger dorsal    PLAN:    Symptoms show interval improvement over the past week per both  patient's report and previous office note.    Antibiotic:  Patient advised to continue Keflex and take until completion.    Activity:  Encouraged on gentle digit range of motion to prevent stiffness    Wound care: Continue twice daily cleaning with dry dressing and Xeroform.  Wound care provided today in clinic    FOLLOW UP:  1 week for close follow-up

## 2025-02-11 ENCOUNTER — OFFICE VISIT (OUTPATIENT)
Dept: ORTHOPEDICS | Facility: CLINIC | Age: 75
End: 2025-02-11
Payer: OTHER GOVERNMENT

## 2025-02-11 DIAGNOSIS — S61.411D LACERATION OF RIGHT HAND WITHOUT FOREIGN BODY, SUBSEQUENT ENCOUNTER: Primary | ICD-10-CM

## 2025-02-11 PROCEDURE — 99999 PR PBB SHADOW E&M-EST. PATIENT-LVL III: CPT | Mod: PBBFAC,,, | Performed by: PHYSICIAN ASSISTANT

## 2025-02-11 PROCEDURE — 99213 OFFICE O/P EST LOW 20 MIN: CPT | Mod: PBBFAC,PN | Performed by: PHYSICIAN ASSISTANT

## 2025-02-11 PROCEDURE — 99213 OFFICE O/P EST LOW 20 MIN: CPT | Mod: S$PBB,,, | Performed by: PHYSICIAN ASSISTANT

## 2025-02-17 NOTE — PROGRESS NOTES
Subjective:      Patient ID: Riky Salcedo is a 74 y.o. male.  Chief Complaint: Pain of the Right Hand    HPI  Riky Salcedo is a  74 y.o. male returns today for follow up of laceration he sustained to fingers of the right hand proximally.      Seen by Dr. Cueva 10 days ago and switched from Bactrim to Keflex antibiotic.  The patient has been completing twice daily wound cleansing and dry dressing changes.  He notes that his pain, swelling and hand redness have greatly improved.    Denies fevers, chills and nausea.    Review of patient's allergies indicates:   Allergen Reactions    Atenolol Other (See Comments)    Clindamycin Itching and Hives    Statins-hmg-coa reductase inhibitors Palpitations         Current Outpatient Medications   Medication Sig Dispense Refill    cephALEXin (KEFLEX) 500 MG capsule Take 1 capsule (500 mg total) by mouth 4 (four) times daily. 40 capsule 1    FIBER CHOICE ORAL Take by mouth.      lisinopriL (PRINIVIL,ZESTRIL) 40 MG tablet Take 1 tablet (40 mg total) by mouth once daily. 90 tablet 3    multivitamin with minerals tablet Take 1 tablet by mouth once daily.      mupirocin (BACTROBAN) 2 % ointment Apply topically 3 (three) times daily. 15 g 3    omega-3 fatty acids/fish oil (FISH OIL-OMEGA-3 FATTY ACIDS) 300-1,000 mg capsule Take 1 capsule by mouth once daily.      sildenafiL (VIAGRA) 100 MG tablet Take 1 tablet (100 mg total) by mouth daily as needed for Erectile Dysfunction. Dispense generic 10 tablet 11    sulfamethoxazole-trimethoprim 800-160mg (BACTRIM DS) 800-160 mg Tab Take 1 tablet by mouth 2 (two) times daily. 10 tablet 0    triamcinolone acetonide 0.1% (KENALOG) 0.1 % ointment Apply topically 2 (two) times daily. 30 g 0    valACYclovir (VALTREX) 1000 MG tablet Take 1 tablet (1,000 mg total) by mouth 2 (two) times daily. for 7 days (Patient not taking: Reported on 11/20/2024) 14 tablet 0     No current facility-administered medications for this visit.       Past Medical  History:   Diagnosis Date    Cataract     Fever blister     HSV-1 (herpes simplex virus 1) infection     Hyperlipidemia     Hypertension     Keloid cicatrix     Low serum testosterone level     Obese     Peyronie's disease     Sleep apnea, unspecified        Past Surgical History:   Procedure Laterality Date    COLONOSCOPY N/A 8/5/2016    Procedure: COLONOSCOPY;  Surgeon: Denis Herzog MD;  Location: [facility] (4TH FLR);  Service: Endoscopy;  Laterality: N/A;  has hemorrhoid that may need to be banded    COLONOSCOPY N/A 7/29/2021    Procedure: COLONOSCOPY;  Surgeon: Deejay Corley MD;  Location: [facility] ENDO (4TH FLR);  Service: Endoscopy;  Laterality: N/A;  Pt. is fully vaccinated.EC    COLONOSCOPY N/A 5/9/2024    Procedure: COLONOSCOPY;  Surgeon: Eriberto Garcia MD;  Location: [facility] ENDOSCOPY;  Service: Endoscopy;  Laterality: N/A;  4/1/24-Dr. Garcia pt, 5-12wks, PEG, instr portal-DS  5/2/24- lvm and portal msg for pc. DBM  5/3/24- pc complete. DBM    HERNIA REPAIR      LIPOMA RESECTION N/A 11/30/2022    Procedure: EXCISION, LIPOMA Back;  Surgeon: Clayton Madrid MD;  [facility];  Service: General;  Laterality: N/A;    UMBILICAL HERNIA REPAIR         OBJECTIVE:   PHYSICAL EXAM:  Height: 6' (182.9 cm) Weight: 105.7 kg (233 lb 0.4 oz)  Vitals:    02/18/25 0904   Weight: 105.7 kg (233 lb 0.4 oz)   Height: 6' (1.829 m)       Ortho/SPM Exam  Examination right hand   Dorsal digit lacerations appeared healed at this time.  No wound drainage, increased warmth or tenderness to touch  Swelling continues to improve.    Patient able to demonstrate near full range of motion to all digits  Brisk cap refill to finger pads.  Sensation grossly intact throughout        ASSESSMENT/PLAN:     IMPRESSION:  Infected laceration right middle finger dorsal    PLAN:    Patient's digits show ongoing healing without active infection.  He has regained excellent range of motion.    At this point, he can discontinue  dry dressing changes.  He was encouraged to continue twice daily washing with soap and water.  Patient also encouraged on continued digit range of motion.    Okay to increase activity as tolerated with the right hand    FOLLOW UP:  [place].

## 2025-02-18 ENCOUNTER — OFFICE VISIT (OUTPATIENT)
Dept: ORTHOPEDICS | Facility: CLINIC | Age: 75
End: 2025-02-18
Payer: OTHER GOVERNMENT

## 2025-02-18 VITALS — BODY MASS INDEX: 31.56 KG/M2 | WEIGHT: 233 LBS | HEIGHT: 72 IN

## 2025-02-18 DIAGNOSIS — S61.411D LACERATION OF RIGHT HAND WITHOUT FOREIGN BODY, SUBSEQUENT ENCOUNTER: Primary | ICD-10-CM

## 2025-02-18 PROCEDURE — 99213 OFFICE O/P EST LOW 20 MIN: CPT | Mod: PBBFAC,PN | Performed by: PHYSICIAN ASSISTANT

## 2025-03-21 ENCOUNTER — OFFICE VISIT (OUTPATIENT)
Dept: DERMATOLOGY | Facility: CLINIC | Age: 75
End: 2025-03-21
Payer: MEDICARE

## 2025-03-21 DIAGNOSIS — L82.1 SEBORRHEIC KERATOSES: ICD-10-CM

## 2025-03-21 DIAGNOSIS — L71.9 ROSACEA: Primary | ICD-10-CM

## 2025-03-21 DIAGNOSIS — Z12.83 SKIN EXAM, SCREENING FOR CANCER: ICD-10-CM

## 2025-03-21 DIAGNOSIS — D22.9 NEVUS OF MULTIPLE SITES: ICD-10-CM

## 2025-03-21 DIAGNOSIS — L72.3 SEBACEOUS CYST: ICD-10-CM

## 2025-03-21 PROCEDURE — 99213 OFFICE O/P EST LOW 20 MIN: CPT | Mod: PBBFAC,PO | Performed by: DERMATOLOGY

## 2025-03-21 PROCEDURE — 99999 PR PBB SHADOW E&M-EST. PATIENT-LVL III: CPT | Mod: PBBFAC,,, | Performed by: DERMATOLOGY

## 2025-03-21 RX ORDER — METRONIDAZOLE TOPICAL 7.5 MG/G
GEL TOPICAL
Qty: 45 G | Refills: 6 | Status: SHIPPED | OUTPATIENT
Start: 2025-03-21

## 2025-03-21 NOTE — PROGRESS NOTES
Subjective:      Patient ID:  Riky Salcedo is a 74 y.o. male who presents for   Chief Complaint   Patient presents with    Skin Check     UBSE    Follow-up     rosacea     Follow up rosacea he had the laser for telangiectasias improved out of topical tx now.  Two cysts on back would like tx and would like skin check.         Review of Systems   Constitutional:  Negative for fever, chills, weight loss, weight gain, fatigue, night sweats and malaise.   Skin:  Positive for wears hat. Negative for itching, rash, daily sunscreen use and activity-related sunscreen use.   Hematologic/Lymphatic: Does not bruise/bleed easily (bruise easily).       Objective:   Physical Exam   Constitutional: He appears well-developed and well-nourished.   Neurological: He is alert and oriented to person, place, and time.   Psychiatric: He has a normal mood and affect.   Skin:   Areas Examined (abnormalities noted in diagram):   Scalp / Hair Palpated and Inspected  Head / Face Inspection Performed  Neck Inspection Performed  Chest / Axilla Inspection Performed  Abdomen Inspection Performed  Back Inspection Performed  RUE Inspected  LUE Inspection Performed                 Diagram Legend     Erythematous scaling macule/papule c/w actinic keratosis       Vascular papule c/w angioma      Pigmented verrucoid papule/plaque c/w seborrheic keratosis      Yellow umbilicated papule c/w sebaceous hyperplasia      Irregularly shaped tan macule c/w lentigo     1-2 mm smooth white papules consistent with Milia      Movable subcutaneous cyst with punctum c/w epidermal inclusion cyst      Subcutaneous movable cyst c/w pilar cyst      Firm pink to brown papule c/w dermatofibroma      Pedunculated fleshy papule(s) c/w skin tag(s)      Evenly pigmented macule c/w junctional nevus     Mildly variegated pigmented, slightly irregular-bordered macule c/w mildly atypical nevus      Flesh colored to evenly pigmented papule c/w intradermal nevus       Pink  "pearly papule/plaque c/w basal cell carcinoma      Erythematous hyperkeratotic cursted plaque c/w SCC      Surgical scar with no sign of skin cancer recurrence      Open and closed comedones      Inflammatory papules and pustules      Verrucoid papule consistent consistent with wart     Erythematous eczematous patches and plaques     Dystrophic onycholytic nail with subungual debris c/w onychomycosis     Umbilicated papule    Erythematous-base heme-crusted tan verrucoid plaque consistent with inflamed seborrheic keratosis     Erythematous Silvery Scaling Plaque c/w Psoriasis     See annotation      Assessment / Plan:        Rosacea, not at treatment goal   -     metroNIDAZOLE (METROGEL) 0.75 % gel; Use hs on face  Dispense: 45 g; Refill: 6    Nevus of multiple sites  The "ABCD" rules to observe pigmented lesions were reviewed.      Sebaceous cysts  Referred for removal    Seborrheic keratoses  Seborrheic keratosis scattered, told benign no treatment needed.       Skin exam, screening for cancer   No lesions suspicious for malignancy noted.    Recommend daily sun protection/avoidance and use of at least SPF 30, broad spectrum sunscreen (OTC drug).                Follow up in about 1 year (around 3/21/2026).  "

## 2025-04-01 ENCOUNTER — OFFICE VISIT (OUTPATIENT)
Dept: SURGERY | Facility: CLINIC | Age: 75
End: 2025-04-01
Payer: MEDICARE

## 2025-04-01 VITALS
WEIGHT: 237.44 LBS | HEART RATE: 83 BPM | DIASTOLIC BLOOD PRESSURE: 82 MMHG | HEIGHT: 72 IN | BODY MASS INDEX: 32.16 KG/M2 | OXYGEN SATURATION: 97 % | SYSTOLIC BLOOD PRESSURE: 144 MMHG

## 2025-04-01 DIAGNOSIS — R22.2 MASS OF SUBCUTANEOUS TISSUE OF BACK: Primary | ICD-10-CM

## 2025-04-01 PROCEDURE — 99999 PR PBB SHADOW E&M-EST. PATIENT-LVL IV: CPT | Mod: PBBFAC,,, | Performed by: SURGERY

## 2025-04-01 PROCEDURE — 99214 OFFICE O/P EST MOD 30 MIN: CPT | Mod: PBBFAC | Performed by: SURGERY

## 2025-04-01 PROCEDURE — 99212 OFFICE O/P EST SF 10 MIN: CPT | Mod: S$PBB,,, | Performed by: SURGERY

## 2025-04-01 NOTE — PROGRESS NOTES
General Surgery Office Visit   History and Physical    Patient Name: Riky Salcedo  YOB: 1950 (75 y.o.)  MRN: [id number]  Today's Date: 04/01/2025    Referring Md:   Anna Cortez Md  [address] [place] [address]    SUBJECTIVE:     Chief Complaint: Mass of back x2    History of Present Illness:  Riky Salcedo is a 75 y.o. male with PMHx of alcohol abuse, ILD, calcified granuloma of lung, HTN, HLD, venous insufficiency, BPH, IRLANDA who presents to the clinic today complaining of 2 masses to his back which he first noticed years ago. He believes that they are getting larger over time. No redness or drainage. Not painful. No known aggravating or alleviating factors. He denies fever, chills, unintentional weight loss, diaphoresis, CP, SOB, and all other symptoms. Patient reports being compliant with home medication regimen.     Not currently on any anticoagulants      Review of patient's allergies indicates:   Allergen Reactions    Atenolol Other (See Comments)    Clindamycin Itching and Hives    Statins-hmg-coa reductase inhibitors Palpitations       Past Medical History:   Diagnosis Date    Cataract     Fever blister     HSV-1 (herpes simplex virus 1) infection     Hyperlipidemia     Hypertension     Keloid cicatrix     Low serum testosterone level     Obese     Peyronie's disease     Sleep apnea, unspecified      Past Surgical History:   Procedure Laterality Date    COLONOSCOPY N/A 8/5/2016    Procedure: COLONOSCOPY;  Surgeon: Denis Herzog MD;  Location: [address]);  Service: Endoscopy;  Laterality: N/A;  has hemorrhoid that may need to be banded    COLONOSCOPY N/A 7/29/2021    Procedure: COLONOSCOPY;  Surgeon: Deejay Corley MD;  Location: [address]);  Service: Endoscopy;  Laterality: N/A;  Pt. is fully vaccinated.EC    COLONOSCOPY N/A 5/9/2024    Procedure: COLONOSCOPY;  Surgeon: Eriberto Garcia MD;  Location: [facility] ENDOSCOPY;  Service: Endoscopy;   Laterality: N/A;  4/1/24-Dr. Garcia pt, 5-12wks, PEG, instr portal-DS  5/2/24- lvm and portal msg for pc. DBM  5/3/24- pc complete. DBM    HERNIA REPAIR      LIPOMA RESECTION N/A 11/30/2022    Procedure: EXCISION, LIPOMA Back;  Surgeon: Clayton Madrid MD;  Location: [facility] OR [address] [place] [address];  Service: General;  Laterality: N/A;    UMBILICAL HERNIA REPAIR       Family History   Problem Relation Name Age of Onset    Heart disease Mother      Heart disease Father      Hypertension Father      Depression Sister x1     No Known Problems Brother x1     No Known Problems Daughter x1     Diabetes Son x1     Heart disease Paternal Grandmother      Hypertension Paternal Grandmother      Colon cancer Neg Hx      Crohn's disease Neg Hx      Ulcerative colitis Neg Hx      Melanoma Neg Hx      Prostate cancer Neg Hx      Kidney disease Neg Hx       Social History[1]         Tobacco Use    Smoking status: Former       Types: Cigars    Smokeless tobacco: Never    Tobacco comments:       Smoked cigars at age 12-13, not for long   Substance Use Topics    Alcohol use: Not Currently       Alcohol/week: 12.0 - 16.0 standard drinks of alcohol       Types: 12 - 16 Cans of beer per week       Comment: 3-4 beers when he drinks, about 3-4 x per week    Drug use: No       Review of Systems:  Review of Systems   Constitutional:  Negative for chills and fever.   Respiratory:  Negative for cough and shortness of breath.    Cardiovascular:  Negative for chest pain.   Gastrointestinal:  Negative for abdominal pain, diarrhea, nausea and vomiting.   Genitourinary:  Negative for dysuria and hematuria.   Musculoskeletal:  Negative for falls.   Skin:  Negative for rash.        (+) back mass x2  (-) redness, drainage   Neurological:  Negative for dizziness and headaches.   Psychiatric/Behavioral:  Negative for substance abuse. The patient is not nervous/anxious.    All other systems reviewed and are negative.      OBJECTIVE:     Vital Signs (Most  Recent)  BP (!) 144/82 (BP Location: Left arm, Patient Position: Sitting)   Pulse 83   Ht 6' (1.829 m)   Wt 107.7 kg (237 lb 7 oz)   SpO2 97%   BMI 32.20 kg/m²     Physical Exam  Vitals and nursing note reviewed.   Constitutional:       General: He is not in acute distress.     Appearance: He is not ill-appearing or diaphoretic.      Comments: Room air   HENT:      Head: Normocephalic and atraumatic.      Mouth/Throat:      Mouth: Mucous membranes are moist.      Pharynx: Oropharynx is clear.   Eyes:      Extraocular Movements: Extraocular movements intact.      Conjunctiva/sclera: Conjunctivae normal.   Cardiovascular:      Rate and Rhythm: Normal rate.   Pulmonary:      Effort: Pulmonary effort is normal. No respiratory distress.   Musculoskeletal:         General: No deformity.   Skin:     General: Skin is warm and dry.      Coloration: Skin is not jaundiced.          Neurological:      Mental Status: He is alert and oriented to person, place, and time.         ASSESSMENT/PLAN:     Riky Salcedo is a 75 y.o. male with PMHx of alcohol abuse, ILD, calcified granuloma of lung, HTN, HLD, venous insufficiency, BPH, IRLANDA who presents to the clinic today complaining of 2 masses to his back concerning for cysts. Clinically stable without B symptoms or signs of infection     Riky was seen today for follow-up.    Diagnoses and all orders for this visit:    Mass of subcutaneous tissue of back  -     Ambulatory referral/consult to General Surgery      - Discussed risks and benefits of both operative and nonoperative management with patient in detail. Patient elected for operative management  - Schedule for removal of both masses in minor room with Dr. Madrid  - ED precautions given  - Continue any current medications  - Call with any questions or concerns  - Discussed POC with patient. All questions were answered. Patient is agreeable to plan and verbalized understanding.     Patient and POC discussed with general  surgery staff, Dr. Madrid. They are in agreement with current POC. Patient seen and examined by Dr. Madrid.      Jon Deleon MPAS, PA-C  General Surgery  - [facility]           [1]   Social History  Tobacco Use    Smoking status: Former     Types: Cigars    Smokeless tobacco: Never    Tobacco comments:     Smoked cigars at age 10-13, not for long   Substance Use Topics    Alcohol use: Not Currently     Alcohol/week: 12.0 - 16.0 standard drinks of alcohol     Types: 12 - 16 Cans of beer per week     Comment: 3-4 beers when he drinks, about 3-4 x per week    Drug use: No

## 2025-04-08 ENCOUNTER — PROCEDURE VISIT (OUTPATIENT)
Dept: SURGERY | Facility: CLINIC | Age: 75
End: 2025-04-08
Payer: MEDICARE

## 2025-04-08 VITALS
SYSTOLIC BLOOD PRESSURE: 147 MMHG | OXYGEN SATURATION: 96 % | HEIGHT: 72 IN | HEART RATE: 76 BPM | DIASTOLIC BLOOD PRESSURE: 79 MMHG | WEIGHT: 236.13 LBS | BODY MASS INDEX: 31.98 KG/M2

## 2025-04-08 DIAGNOSIS — R22.2 MASS OF SUBCUTANEOUS TISSUE OF BACK: Primary | ICD-10-CM

## 2025-04-08 PROCEDURE — 88304 TISSUE EXAM BY PATHOLOGIST: CPT | Mod: TC,91 | Performed by: SURGERY

## 2025-04-08 PROCEDURE — 88304 TISSUE EXAM BY PATHOLOGIST: CPT | Mod: 26,,, | Performed by: PATHOLOGY

## 2025-04-08 PROCEDURE — 21930 EXC BACK LES SC < 3 CM: CPT | Mod: S$PBB,,, | Performed by: SURGERY

## 2025-04-08 PROCEDURE — 21930 EXC BACK LES SC < 3 CM: CPT | Mod: PBBFAC | Performed by: SURGERY

## 2025-04-08 NOTE — PROCEDURES
Excision of Lesion    Date/Time: 4/8/2025 1:00 PM    Performed by: Gabriela Meier MD  Authorized by: Gabriela Meier MD    Consent Done?:  Yes (Written)  Timeout: prior to procedure the correct patient, procedure, and site was verified    Prep: patient was prepped and draped in usual sterile fashion    Local anesthesia used?: Yes    Anesthesia:  Local infiltration  Local anesthetic:  Lidocaine 1% without epinephrine  Assistants?: No    Indications:  Cyst  Body area:  Back / flank  Laterality:  Left  Position:  Lateral   Patient was prepped and draped in the normal sterile fashion.  Anesthesia:  Local infiltration  Local anesthetic:  Lidocaine 1% without epinephrine  Excision type:  Skin  Scalpel size:  15  Incision type:  Elliptical  Specimens?: Yes     Specimens submitted to pathology.   Hemostasis was obtained.  Wound closure:  Complex layered  Sutures:  3-0 Vicryl and 4-0 Monocryl  Sterile dressings:  Gauze and Tegaderm   Needle, instrument, and sponge counts were correct.   Patient tolerated the procedure well with no immediate complications.   Post-operative instructions were provided for the patient.  Comments:      2x subcutaneous masses removed -- R lower back (1x1 cm) + midback (1x1.5cm). Pt tolerated well

## 2025-04-11 LAB
ESTROGEN SERPL-MCNC: NORMAL PG/ML
INSULIN SERPL-ACNC: NORMAL U[IU]/ML
LAB AP GROSS DESCRIPTION: NORMAL
LAB AP PERFORMING LOCATION(S): NORMAL
LAB AP REPORT FOOTNOTES: NORMAL

## 2025-04-17 ENCOUNTER — PATIENT MESSAGE (OUTPATIENT)
Dept: SURGERY | Facility: CLINIC | Age: 75
End: 2025-04-17
Payer: MEDICARE

## 2025-04-17 DIAGNOSIS — G47.33 OSA (OBSTRUCTIVE SLEEP APNEA): Primary | ICD-10-CM

## 2025-05-13 ENCOUNTER — LAB VISIT (OUTPATIENT)
Dept: LAB | Facility: HOSPITAL | Age: 75
End: 2025-05-13
Attending: FAMILY MEDICINE
Payer: MEDICARE

## 2025-05-13 DIAGNOSIS — I10 PRIMARY HYPERTENSION: ICD-10-CM

## 2025-05-13 LAB
BILIRUB UR QL STRIP.AUTO: NEGATIVE
CLARITY UR: CLEAR
COLOR UR AUTO: YELLOW
GLUCOSE UR QL STRIP: NEGATIVE
HGB UR QL STRIP: NEGATIVE
KETONES UR QL STRIP: NEGATIVE
LEUKOCYTE ESTERASE UR QL STRIP: NEGATIVE
NITRITE UR QL STRIP: NEGATIVE
PH UR STRIP: 5 [PH]
PROT UR QL STRIP: NEGATIVE
SP GR UR STRIP: 1.01
UROBILINOGEN UR STRIP-ACNC: NEGATIVE EU/DL

## 2025-05-13 PROCEDURE — 81003 URINALYSIS AUTO W/O SCOPE: CPT

## 2025-05-14 ENCOUNTER — RESULTS FOLLOW-UP (OUTPATIENT)
Dept: FAMILY MEDICINE | Facility: CLINIC | Age: 75
End: 2025-05-14

## 2025-05-21 ENCOUNTER — OFFICE VISIT (OUTPATIENT)
Dept: FAMILY MEDICINE | Facility: CLINIC | Age: 75
End: 2025-05-21
Payer: MEDICARE

## 2025-05-21 VITALS
OXYGEN SATURATION: 97 % | SYSTOLIC BLOOD PRESSURE: 124 MMHG | BODY MASS INDEX: 32.51 KG/M2 | DIASTOLIC BLOOD PRESSURE: 70 MMHG | WEIGHT: 240 LBS | HEART RATE: 94 BPM | HEIGHT: 72 IN

## 2025-05-21 DIAGNOSIS — R73.03 PREDIABETES: ICD-10-CM

## 2025-05-21 DIAGNOSIS — E83.51 HYPOCALCEMIA: ICD-10-CM

## 2025-05-21 DIAGNOSIS — J84.9 ILD (INTERSTITIAL LUNG DISEASE): ICD-10-CM

## 2025-05-21 DIAGNOSIS — E87.5 HYPERKALEMIA: ICD-10-CM

## 2025-05-21 DIAGNOSIS — I70.0 AORTIC ATHEROSCLEROSIS: ICD-10-CM

## 2025-05-21 DIAGNOSIS — D69.2 SENILE PURPURA: ICD-10-CM

## 2025-05-21 DIAGNOSIS — F10.19 ALCOHOL ABUSE WITH ALCOHOL-INDUCED DISORDER: ICD-10-CM

## 2025-05-21 DIAGNOSIS — I10 PRIMARY HYPERTENSION: Primary | ICD-10-CM

## 2025-05-21 PROCEDURE — 99215 OFFICE O/P EST HI 40 MIN: CPT | Mod: S$PBB,,, | Performed by: FAMILY MEDICINE

## 2025-05-21 PROCEDURE — 99214 OFFICE O/P EST MOD 30 MIN: CPT | Mod: PBBFAC,PO | Performed by: FAMILY MEDICINE

## 2025-05-21 PROCEDURE — 99999 PR PBB SHADOW E&M-EST. PATIENT-LVL IV: CPT | Mod: PBBFAC,,, | Performed by: FAMILY MEDICINE

## 2025-05-22 NOTE — PROGRESS NOTES
Subjective     Patient ID: Riky Salcedo is a 75 y.o. male.    Chief Complaint: Hypertension    75 years old male came to the clinic for blood pressure check.  Blood pressure today was stable.  He was previously diagnosed with interstitial lung disease no recent flare ups.  Patient was previously diagnosed with senile purpura.  He reports alcohol use occasionally.  Patient with slightly elevated potassium and low calcium.  Probably no clinical significance.  Last A1c at the level prediabetes.  He was diagnosed before with aortic arthrosclerosis.  He was not able to tolerate statin therapy.    Hypertension  This is a chronic problem. The current episode started more than 1 year ago. The problem is unchanged. The problem is controlled. Pertinent negatives include no chest pain, orthopnea, palpitations or peripheral edema. There are no associated agents to hypertension. Risk factors for coronary artery disease include male gender, obesity and sedentary lifestyle. Past treatments include ACE inhibitors. The current treatment provides significant improvement. Compliance problems include exercise.  There is no history of angina. There is no history of a hypertension causing med or a thyroid problem.   Patient previously diagnosed with senile purpura.  Review of Systems   Constitutional: Negative.    HENT: Negative.     Eyes: Negative.    Respiratory: Negative.     Cardiovascular: Negative.  Negative for chest pain, palpitations and orthopnea.   Gastrointestinal: Negative.    Genitourinary: Negative.    Musculoskeletal: Negative.    Integumentary:  Negative.   Neurological: Negative.    Psychiatric/Behavioral: Negative.            Objective     Physical Exam  Vitals and nursing note reviewed.   Constitutional:       General: He is not in acute distress.     Appearance: He is well-developed. He is not diaphoretic.   HENT:      Head: Normocephalic and atraumatic.      Right Ear: External ear normal.      Left Ear:  External ear normal.      Nose: Nose normal.      Mouth/Throat:      Pharynx: No oropharyngeal exudate.   Eyes:      General: No scleral icterus.        Right eye: No discharge.         Left eye: No discharge.      Conjunctiva/sclera: Conjunctivae normal.      Pupils: Pupils are equal, round, and reactive to light.   Neck:      Thyroid: No thyromegaly.      Vascular: No JVD.      Trachea: No tracheal deviation.   Cardiovascular:      Rate and Rhythm: Normal rate and regular rhythm.      Heart sounds: Normal heart sounds. No murmur heard.     No friction rub. No gallop.   Pulmonary:      Effort: Pulmonary effort is normal. No respiratory distress.      Breath sounds: Normal breath sounds. No stridor. No wheezing or rales.   Chest:      Chest wall: No tenderness.   Abdominal:      General: Bowel sounds are normal. There is no distension.      Palpations: Abdomen is soft. There is no mass.      Tenderness: There is no abdominal tenderness. There is no guarding or rebound.   Musculoskeletal:         General: No tenderness. Normal range of motion.      Cervical back: Normal range of motion and neck supple.   Lymphadenopathy:      Cervical: No cervical adenopathy.   Skin:     General: Skin is warm and dry.      Coloration: Skin is not pale.      Findings: No erythema or rash.   Neurological:      Mental Status: He is alert and oriented to person, place, and time.      Cranial Nerves: No cranial nerve deficit.      Motor: No abnormal muscle tone.      Coordination: Coordination normal.      Deep Tendon Reflexes: Reflexes are normal and symmetric. Reflexes normal.   Psychiatric:         Behavior: Behavior normal.         Thought Content: Thought content normal.         Judgment: Judgment normal.            Assessment and Plan     1. Primary hypertension  -     Urinalysis; Future  -     Comprehensive Metabolic Panel; Future; Expected date: 05/21/2025  -     Lipid Panel; Future; Expected date: 05/21/2025  -     TSH; Future;  Expected date: 05/21/2025  -     CBC Auto Differential; Future; Expected date: 05/21/2025    2. ILD (interstitial lung disease)    3. Senile purpura  -     CBC Auto Differential; Future; Expected date: 05/21/2025    4. Alcohol abuse with alcohol-induced disorder  -     Comprehensive Metabolic Panel; Future; Expected date: 05/21/2025    5. Aortic atherosclerosis  -     Lipid Panel; Future; Expected date: 05/21/2025    6. Hyperkalemia  -     Comprehensive Metabolic Panel; Future; Expected date: 05/21/2025    7. Hypocalcemia  -     Comprehensive Metabolic Panel; Future; Expected date: 05/21/2025    8. Prediabetes  -     Comprehensive Metabolic Panel; Future; Expected date: 05/21/2025  -     Hemoglobin A1C; Future; Expected date: 05/21/2025    I spent a total of 45 minutes on the day of the visit.This includes face to face time and non-face to face time preparing to see the patient (eg, review of tests), obtaining and/or reviewing separately obtained history, documenting clinical information in the electronic or other health record, independently interpreting results and communicating results to the patient/family/caregiver, or care coordinator.      Diet and physical activity to promote weight loss.          Follow up in about 6 months (around 11/21/2025), or if symptoms worsen or fail to improve.

## 2025-06-20 ENCOUNTER — PATIENT MESSAGE (OUTPATIENT)
Dept: INTERNAL MEDICINE | Facility: CLINIC | Age: 75
End: 2025-06-20
Payer: MEDICARE

## 2025-06-20 ENCOUNTER — LAB VISIT (OUTPATIENT)
Dept: LAB | Facility: HOSPITAL | Age: 75
End: 2025-06-20
Payer: MEDICARE

## 2025-06-20 ENCOUNTER — RESULTS FOLLOW-UP (OUTPATIENT)
Dept: INTERNAL MEDICINE | Facility: CLINIC | Age: 75
End: 2025-06-20

## 2025-06-20 ENCOUNTER — OFFICE VISIT (OUTPATIENT)
Dept: INTERNAL MEDICINE | Facility: CLINIC | Age: 75
End: 2025-06-20
Payer: MEDICARE

## 2025-06-20 ENCOUNTER — TELEPHONE (OUTPATIENT)
Dept: FAMILY MEDICINE | Facility: CLINIC | Age: 75
End: 2025-06-20
Payer: MEDICARE

## 2025-06-20 VITALS
OXYGEN SATURATION: 97 % | HEART RATE: 83 BPM | HEIGHT: 72 IN | SYSTOLIC BLOOD PRESSURE: 120 MMHG | WEIGHT: 236.56 LBS | BODY MASS INDEX: 32.04 KG/M2 | DIASTOLIC BLOOD PRESSURE: 74 MMHG

## 2025-06-20 DIAGNOSIS — M79.675 PAIN IN TOES OF BOTH FEET: Primary | ICD-10-CM

## 2025-06-20 DIAGNOSIS — M79.675 PAIN IN TOES OF BOTH FEET: ICD-10-CM

## 2025-06-20 DIAGNOSIS — E79.0 ELEVATED BLOOD URIC ACID LEVEL: ICD-10-CM

## 2025-06-20 DIAGNOSIS — M79.674 PAIN IN TOES OF BOTH FEET: ICD-10-CM

## 2025-06-20 DIAGNOSIS — M79.674 PAIN IN TOES OF BOTH FEET: Primary | ICD-10-CM

## 2025-06-20 LAB
ALBUMIN SERPL BCP-MCNC: 4 G/DL (ref 3.5–5.2)
ALP SERPL-CCNC: 91 UNIT/L (ref 40–150)
ALT SERPL W/O P-5'-P-CCNC: 21 UNIT/L (ref 10–44)
ANION GAP (OHS): 11 MMOL/L (ref 8–16)
AST SERPL-CCNC: 19 UNIT/L (ref 11–45)
BILIRUB SERPL-MCNC: 0.3 MG/DL (ref 0.1–1)
BUN SERPL-MCNC: 27 MG/DL (ref 8–23)
CALCIUM SERPL-MCNC: 9.5 MG/DL (ref 8.7–10.5)
CHLORIDE SERPL-SCNC: 106 MMOL/L (ref 95–110)
CO2 SERPL-SCNC: 23 MMOL/L (ref 23–29)
CREAT SERPL-MCNC: 1.1 MG/DL (ref 0.5–1.4)
GFR SERPLBLD CREATININE-BSD FMLA CKD-EPI: >60 ML/MIN/1.73/M2
GLUCOSE SERPL-MCNC: 104 MG/DL (ref 70–110)
POTASSIUM SERPL-SCNC: 4.9 MMOL/L (ref 3.5–5.1)
PROT SERPL-MCNC: 7.1 GM/DL (ref 6–8.4)
SODIUM SERPL-SCNC: 140 MMOL/L (ref 136–145)
URATE SERPL-MCNC: 10.1 MG/DL (ref 3.4–7)

## 2025-06-20 PROCEDURE — 99213 OFFICE O/P EST LOW 20 MIN: CPT | Mod: PBBFAC | Performed by: PHYSICIAN ASSISTANT

## 2025-06-20 PROCEDURE — 99999 PR PBB SHADOW E&M-EST. PATIENT-LVL III: CPT | Mod: PBBFAC,,, | Performed by: PHYSICIAN ASSISTANT

## 2025-06-20 PROCEDURE — 99214 OFFICE O/P EST MOD 30 MIN: CPT | Mod: S$PBB,,, | Performed by: PHYSICIAN ASSISTANT

## 2025-06-20 PROCEDURE — 80053 COMPREHEN METABOLIC PANEL: CPT

## 2025-06-20 PROCEDURE — 36415 COLL VENOUS BLD VENIPUNCTURE: CPT

## 2025-06-20 PROCEDURE — 84550 ASSAY OF BLOOD/URIC ACID: CPT

## 2025-06-20 RX ORDER — ALLOPURINOL 100 MG/1
100 TABLET ORAL DAILY
Qty: 14 TABLET | Refills: 0 | Status: SHIPPED | OUTPATIENT
Start: 2025-06-20 | End: 2025-07-04

## 2025-06-20 RX ORDER — INDOMETHACIN 25 MG/1
25 CAPSULE ORAL 2 TIMES DAILY WITH MEALS
Qty: 6 CAPSULE | Refills: 0 | Status: SHIPPED | OUTPATIENT
Start: 2025-06-20 | End: 2025-06-23

## 2025-06-20 NOTE — PROGRESS NOTES
INTERNAL MEDICINE URGENT VISIT NOTE    CHIEF COMPLAINT     No chief complaint on file.      HPI     Riky Salcedo is a 75 y.o. male who presents for an urgent visit today.    PCP is Taj De La Vega MD, patient is new to me.     Patient presents with complaints of bilateral great toe pain that is been present for approximately 1 week prior to arrival.  Right toe greater than left.  Toe is red, swollen, no open wounds or injuries reported.    Gout: Clinical Diagnostic Rule:  Score greater than 8  Male Sex (2 pts)  Previous patient-reported arthritis attack (2 pts)  Onset within 1 day (0.5 pts)  Joint redness (1 pt) \first MTP joint involvement (2.5 pts)   Hypertension or at least one cardiovascular disease (1.5 pts)   Serum urate level > 5.88 mg/dL (3.5 pts)    Drinks beer daily   Has been cutting grass at home - feels like shoe might be tight.  Foot might be swollen.  No trauma or injury.    He is concerned this is a gout flare   He thinks that he may have had a gout flare in the past (knee) but no formal diagnosis.  No fever, chills, nausea or vomiting.      Past Medical History:  Past Medical History:   Diagnosis Date    Cataract     Fever blister     HSV-1 (herpes simplex virus 1) infection     Hyperlipidemia     Hypertension     Keloid cicatrix     Low serum testosterone level     Obese     Peyronie's disease     Sleep apnea, unspecified        Home Medications:  Prior to Admission medications    Medication Sig Start Date End Date Taking? Authorizing Provider   FIBER CHOICE ORAL Take by mouth.    Provider, Historical   lisinopriL (PRINIVIL,ZESTRIL) 40 MG tablet Take 1 tablet (40 mg total) by mouth once daily. 11/20/24   Taj De La Vega MD   metroNIDAZOLE (METROGEL) 0.75 % gel Use hs on face  Patient not taking: Reported on 5/21/2025 3/21/25   Susana Rivera MD   multivitamin with minerals tablet Take 1 tablet by mouth once daily.    Provider, Historical   mupirocin (BACTROBAN) 2 % ointment  Apply topically 3 (three) times daily.  Patient taking differently: Apply topically as needed. 6/24/24   Sofia Cortez MD   omega-3 fatty acids/fish oil (FISH OIL-OMEGA-3 FATTY ACIDS) 300-1,000 mg capsule Take 1 capsule by mouth once daily.    Provider, Historical   sildenafiL (VIAGRA) 100 MG tablet Take 1 tablet (100 mg total) by mouth daily as needed for Erectile Dysfunction. Dispense generic  Patient not taking: Reported on 5/21/2025 10/9/23 11/20/24  Jonh Linda MD   valACYclovir (VALTREX) 1000 MG tablet Take 1 tablet (1,000 mg total) by mouth 2 (two) times daily. for 7 days  Patient taking differently: Take 1,000 mg by mouth continuous prn. 9/11/24 5/21/25  Taj De La Vega MD       Review of Systems:  Review of Systems   Constitutional:  Negative for chills and fever.   HENT:  Negative for sore throat and trouble swallowing.    Eyes:  Negative for visual disturbance.   Respiratory:  Negative for cough and shortness of breath.    Cardiovascular:  Negative for chest pain.   Gastrointestinal:  Negative for abdominal pain, constipation, diarrhea, nausea and vomiting.   Genitourinary:  Negative for dysuria and flank pain.   Musculoskeletal:  Negative for back pain, neck pain and neck stiffness.        Bilateral great toe pain right greater than left   Skin:  Negative for rash.   Neurological:  Negative for dizziness, syncope, weakness and headaches.   Psychiatric/Behavioral:  Negative for confusion.        Health Maintainence:   Immunizations:  Health Maintenance         Date Due Completion Date    Shingles Vaccine (2 of 3) 05/22/2018 3/27/2018    COVID-19 Vaccine (6 - 2024-25 season) 09/01/2024 7/1/2024    TETANUS VACCINE 03/24/2025 3/24/2015    Hemoglobin A1c (Prediabetes) 05/13/2026 5/13/2025    Lipid Panel 05/13/2030 5/13/2025             PHYSICAL EXAM     There were no vitals taken for this visit.    Physical Exam  Vitals and nursing note reviewed.   Constitutional:       Appearance:  Normal appearance.      Comments: Elderly male in no acute distress or apparent pain when sitting in exam room.  He makes good eye contact, speaks in clear full sentences and winces in pain with ambulation.   HENT:      Head: Normocephalic and atraumatic.      Nose: Nose normal.      Mouth/Throat:      Pharynx: Oropharynx is clear.   Eyes:      Conjunctiva/sclera: Conjunctivae normal.   Cardiovascular:      Rate and Rhythm: Normal rate and regular rhythm.      Pulses: Normal pulses.   Pulmonary:      Effort: No respiratory distress.   Abdominal:      Tenderness: There is no abdominal tenderness.   Musculoskeletal:         General: Normal range of motion.      Cervical back: No rigidity.      Comments: Both great toes are erythematous right greater than left with TTP over MTP.  Slight edema.  No induration or fluctuance.  No open wounds appreciated.   Skin:     General: Skin is warm and dry.      Capillary Refill: Capillary refill takes less than 2 seconds.      Findings: No rash.   Neurological:      General: No focal deficit present.      Mental Status: He is alert.      Gait: Gait normal.   Psychiatric:         Mood and Affect: Mood normal.         LABS     Lab Results   Component Value Date    HGBA1C 5.7 (H) 05/13/2025     CMP  Sodium   Date Value Ref Range Status   05/13/2025 140 136 - 145 mmol/L Final   11/12/2024 141 136 - 145 mmol/L Final     Potassium   Date Value Ref Range Status   05/13/2025 5.3 (H) 3.5 - 5.1 mmol/L Final     Comment:     *No Visible Hemolysis   11/12/2024 5.1 3.5 - 5.1 mmol/L Final     Chloride   Date Value Ref Range Status   05/13/2025 107 95 - 110 mmol/L Final   11/12/2024 109 95 - 110 mmol/L Final     CO2   Date Value Ref Range Status   05/13/2025 24 23 - 29 mmol/L Final   11/12/2024 23 23 - 29 mmol/L Final     Glucose   Date Value Ref Range Status   05/13/2025 91 70 - 110 mg/dL Final   11/12/2024 102 70 - 110 mg/dL Final     BUN   Date Value Ref Range Status   05/13/2025 17 8 - 23  mg/dL Final     Creatinine   Date Value Ref Range Status   05/13/2025 1.0 0.5 - 1.4 mg/dL Final     Calcium   Date Value Ref Range Status   05/13/2025 8.6 (L) 8.7 - 10.5 mg/dL Final   11/12/2024 9.0 8.7 - 10.5 mg/dL Final     Protein Total   Date Value Ref Range Status   05/13/2025 6.6 6.0 - 8.4 gm/dL Final     Total Protein   Date Value Ref Range Status   11/12/2024 6.9 6.0 - 8.4 g/dL Final     Albumin   Date Value Ref Range Status   05/13/2025 3.3 (L) 3.5 - 5.2 g/dL Final   11/12/2024 3.5 3.5 - 5.2 g/dL Final     Total Bilirubin   Date Value Ref Range Status   11/12/2024 0.3 0.1 - 1.0 mg/dL Final     Comment:     For infants and newborns, interpretation of results should be based  on gestational age, weight and in agreement with clinical  observations.    Premature Infant recommended reference ranges:  Up to 24 hours.............<8.0 mg/dL  Up to 48 hours............<12.0 mg/dL  3-5 days..................<15.0 mg/dL  6-29 days.................<15.0 mg/dL       Bilirubin Total   Date Value Ref Range Status   05/13/2025 0.3 0.1 - 1.0 mg/dL Final     Comment:     For infants and newborns, interpretation of results should be based   on gestational age, weight and in agreement with clinical   observations.    Premature Infant recommended reference ranges:   0-24 hours:  <8.0 mg/dL   24-48 hours: <12.0 mg/dL   3-5 days:    <15.0 mg/dL   6-29 days:   <15.0 mg/dL     Alkaline Phosphatase   Date Value Ref Range Status   11/12/2024 83 40 - 150 U/L Final     ALP   Date Value Ref Range Status   05/13/2025 90 40 - 150 unit/L Final     AST   Date Value Ref Range Status   05/13/2025 23 11 - 45 unit/L Final   11/12/2024 18 10 - 40 U/L Final     ALT   Date Value Ref Range Status   05/13/2025 23 10 - 44 unit/L Final   11/12/2024 18 10 - 44 U/L Final     Anion Gap   Date Value Ref Range Status   05/13/2025 9 8 - 16 mmol/L Final     eGFR if    Date Value Ref Range Status   06/09/2022 >60.0 >60 mL/min/1.73 m^2 Final      eGFR if non    Date Value Ref Range Status   06/09/2022 >60.0 >60 mL/min/1.73 m^2 Final     Comment:     Calculation used to obtain the estimated glomerular filtration  rate (eGFR) is the CKD-EPI equation.        Lab Results   Component Value Date    WBC 8.11 05/13/2025    HGB 14.2 05/13/2025    HCT 43.5 05/13/2025    MCV 93 05/13/2025     05/13/2025     Lab Results   Component Value Date    CHOL 207 (H) 05/13/2025    CHOL 211 (H) 11/12/2024    CHOL 215 (H) 05/14/2024     Lab Results   Component Value Date    HDL 54 05/13/2025    HDL 49 11/12/2024    HDL 43 05/14/2024     Lab Results   Component Value Date    LDLCALC 126.8 05/13/2025    LDLCALC 141.0 11/12/2024    LDLCALC 110.2 05/14/2024     Lab Results   Component Value Date    TRIG 131 05/13/2025    TRIG 105 11/12/2024    TRIG 309 (H) 05/14/2024     Lab Results   Component Value Date    CHOLHDL 26.1 05/13/2025    CHOLHDL 23.2 11/12/2024    CHOLHDL 20.0 05/14/2024     Lab Results   Component Value Date    TSH 3.520 05/13/2025       ASSESSMENT/PLAN     Riky Salcedo is a 75 y.o. male     Riky was seen today for gout.    Diagnoses and all orders for this visit:    Pain in toes of both feet  -     Ambulatory referral/consult to Orthopedics; Future  -     Uric Acid; Future  -     Comprehensive Metabolic Panel; Future    Elevated blood uric acid level  -     allopurinoL (ZYLOPRIM) 100 MG tablet; Take 1 tablet (100 mg total) by mouth once daily. for 14 days  -     Uric Acid; Future    Other orders  -     indomethacin (INDOCIN) 25 MG capsule; Take 1 capsule (25 mg total) by mouth 2 (two) times daily with meals. for 3 days       Patient was counseled on when and how to seek emergent care.       Marcelina Banegas PA-C   Department of Internal Medicine - [facility]   11:17 AM

## 2025-06-20 NOTE — TELEPHONE ENCOUNTER
Return patient phone call. Spoke to patient. Patient stated he has an appointment schedule for his gout at [facility].

## 2025-06-20 NOTE — TELEPHONE ENCOUNTER
Copied from CRM #[id number]. Topic: Appointments - Appointment Scheduling  >> Jun 20, 2025  8:28 AM Kala wrote:  Type:  Same Day Appointment Request    Caller is requesting a same day appointment.  Caller declined first available appointment listed below.    Name of Caller: PT   When is the first available appointment?   Symptoms: Possible Gout   Best Call Back Number: [phone]  Additional Information:

## 2025-06-23 DIAGNOSIS — Z00.00 ENCOUNTER FOR MEDICARE ANNUAL WELLNESS EXAM: ICD-10-CM

## 2025-07-03 ENCOUNTER — LAB VISIT (OUTPATIENT)
Dept: LAB | Facility: HOSPITAL | Age: 75
End: 2025-07-03
Payer: MEDICARE

## 2025-07-03 ENCOUNTER — PATIENT MESSAGE (OUTPATIENT)
Dept: INTERNAL MEDICINE | Facility: CLINIC | Age: 75
End: 2025-07-03
Payer: MEDICARE

## 2025-07-03 DIAGNOSIS — I10 PRIMARY HYPERTENSION: ICD-10-CM

## 2025-07-03 DIAGNOSIS — D69.2 SENILE PURPURA: ICD-10-CM

## 2025-07-03 DIAGNOSIS — F10.19 ALCOHOL ABUSE WITH ALCOHOL-INDUCED DISORDER: ICD-10-CM

## 2025-07-03 DIAGNOSIS — I70.0 AORTIC ATHEROSCLEROSIS: ICD-10-CM

## 2025-07-03 DIAGNOSIS — E83.51 HYPOCALCEMIA: ICD-10-CM

## 2025-07-03 DIAGNOSIS — E87.5 HYPERKALEMIA: ICD-10-CM

## 2025-07-03 DIAGNOSIS — E79.0 ELEVATED BLOOD URIC ACID LEVEL: ICD-10-CM

## 2025-07-03 DIAGNOSIS — R73.03 PREDIABETES: ICD-10-CM

## 2025-07-03 LAB
ABSOLUTE EOSINOPHIL (OHS): 0.22 K/UL
ABSOLUTE MONOCYTE (OHS): 0.75 K/UL (ref 0.3–1)
ABSOLUTE NEUTROPHIL COUNT (OHS): 4.21 K/UL (ref 1.8–7.7)
ALBUMIN SERPL BCP-MCNC: 3.6 G/DL (ref 3.5–5.2)
ALP SERPL-CCNC: 88 UNIT/L (ref 40–150)
ALT SERPL W/O P-5'-P-CCNC: 21 UNIT/L (ref 10–44)
ANION GAP (OHS): 10 MMOL/L (ref 8–16)
AST SERPL-CCNC: 18 UNIT/L (ref 11–45)
BASOPHILS # BLD AUTO: 0.09 K/UL
BASOPHILS NFR BLD AUTO: 1.2 %
BILIRUB SERPL-MCNC: 0.2 MG/DL (ref 0.1–1)
BUN SERPL-MCNC: 26 MG/DL (ref 8–23)
CALCIUM SERPL-MCNC: 8.8 MG/DL (ref 8.7–10.5)
CHLORIDE SERPL-SCNC: 106 MMOL/L (ref 95–110)
CHOLEST SERPL-MCNC: 188 MG/DL (ref 120–199)
CHOLEST/HDLC SERPL: 5.1 {RATIO} (ref 2–5)
CO2 SERPL-SCNC: 21 MMOL/L (ref 23–29)
CREAT SERPL-MCNC: 1.1 MG/DL (ref 0.5–1.4)
EAG (OHS): 120 MG/DL (ref 68–131)
ERYTHROCYTE [DISTWIDTH] IN BLOOD BY AUTOMATED COUNT: 13.9 % (ref 11.5–14.5)
GFR SERPLBLD CREATININE-BSD FMLA CKD-EPI: >60 ML/MIN/1.73/M2
GLUCOSE SERPL-MCNC: 100 MG/DL (ref 70–110)
HBA1C MFR BLD: 5.8 % (ref 4–5.6)
HCT VFR BLD AUTO: 42 % (ref 40–54)
HDLC SERPL-MCNC: 37 MG/DL (ref 40–75)
HDLC SERPL: 19.7 % (ref 20–50)
HGB BLD-MCNC: 13.8 GM/DL (ref 14–18)
IMM GRANULOCYTES # BLD AUTO: 0.02 K/UL (ref 0–0.04)
IMM GRANULOCYTES NFR BLD AUTO: 0.3 % (ref 0–0.5)
LDLC SERPL CALC-MCNC: 135 MG/DL (ref 63–159)
LYMPHOCYTES # BLD AUTO: 1.98 K/UL (ref 1–4.8)
MCH RBC QN AUTO: 30.3 PG (ref 27–31)
MCHC RBC AUTO-ENTMCNC: 32.9 G/DL (ref 32–36)
MCV RBC AUTO: 92 FL (ref 82–98)
NONHDLC SERPL-MCNC: 151 MG/DL
NUCLEATED RBC (/100WBC) (OHS): 0 /100 WBC
PLATELET # BLD AUTO: 248 K/UL (ref 150–450)
PMV BLD AUTO: 11 FL (ref 9.2–12.9)
POTASSIUM SERPL-SCNC: 4.9 MMOL/L (ref 3.5–5.1)
PROT SERPL-MCNC: 6.7 GM/DL (ref 6–8.4)
RBC # BLD AUTO: 4.55 M/UL (ref 4.6–6.2)
RELATIVE EOSINOPHIL (OHS): 3 %
RELATIVE LYMPHOCYTE (OHS): 27.2 % (ref 18–48)
RELATIVE MONOCYTE (OHS): 10.3 % (ref 4–15)
RELATIVE NEUTROPHIL (OHS): 58 % (ref 38–73)
SODIUM SERPL-SCNC: 137 MMOL/L (ref 136–145)
TRIGL SERPL-MCNC: 80 MG/DL (ref 30–150)
TSH SERPL-ACNC: 2.82 UIU/ML (ref 0.4–4)
URATE SERPL-MCNC: 7.6 MG/DL (ref 3.4–7)
WBC # BLD AUTO: 7.27 K/UL (ref 3.9–12.7)

## 2025-07-03 PROCEDURE — 84460 ALANINE AMINO (ALT) (SGPT): CPT

## 2025-07-03 PROCEDURE — 80061 LIPID PANEL: CPT

## 2025-07-03 PROCEDURE — 84443 ASSAY THYROID STIM HORMONE: CPT

## 2025-07-03 PROCEDURE — 83036 HEMOGLOBIN GLYCOSYLATED A1C: CPT

## 2025-07-03 PROCEDURE — 84550 ASSAY OF BLOOD/URIC ACID: CPT

## 2025-07-03 PROCEDURE — 36415 COLL VENOUS BLD VENIPUNCTURE: CPT | Mod: PO

## 2025-07-03 PROCEDURE — 85025 COMPLETE CBC W/AUTO DIFF WBC: CPT

## 2025-07-03 NOTE — TELEPHONE ENCOUNTER
Pt states: Took labs this am. Uric acid a little high. Tomorrow is my last pill.Do you think I should stay on the same dosage . In any case please order a refill for me. [facility] 24 hour pharmacy will be fine. Thank you for your help

## 2025-07-09 ENCOUNTER — OFFICE VISIT (OUTPATIENT)
Dept: FAMILY MEDICINE | Facility: CLINIC | Age: 75
End: 2025-07-09
Payer: MEDICARE

## 2025-07-09 VITALS
SYSTOLIC BLOOD PRESSURE: 124 MMHG | DIASTOLIC BLOOD PRESSURE: 68 MMHG | WEIGHT: 232.5 LBS | OXYGEN SATURATION: 98 % | HEIGHT: 72 IN | HEART RATE: 84 BPM | BODY MASS INDEX: 31.49 KG/M2

## 2025-07-09 DIAGNOSIS — M10.9 ACUTE GOUT INVOLVING TOE OF RIGHT FOOT, UNSPECIFIED CAUSE: Primary | ICD-10-CM

## 2025-07-09 DIAGNOSIS — I10 PRIMARY HYPERTENSION: ICD-10-CM

## 2025-07-09 DIAGNOSIS — E79.0 HYPERURICEMIA: ICD-10-CM

## 2025-07-09 DIAGNOSIS — F10.19 ALCOHOL ABUSE WITH ALCOHOL-INDUCED DISORDER: ICD-10-CM

## 2025-07-09 DIAGNOSIS — R73.03 PREDIABETES: ICD-10-CM

## 2025-07-09 DIAGNOSIS — E79.0 ELEVATED BLOOD URIC ACID LEVEL: ICD-10-CM

## 2025-07-09 DIAGNOSIS — I70.0 AORTIC ATHEROSCLEROSIS: ICD-10-CM

## 2025-07-09 PROCEDURE — 99999 PR PBB SHADOW E&M-EST. PATIENT-LVL IV: CPT | Mod: PBBFAC,,, | Performed by: NURSE PRACTITIONER

## 2025-07-09 PROCEDURE — 99214 OFFICE O/P EST MOD 30 MIN: CPT | Mod: S$PBB,,, | Performed by: NURSE PRACTITIONER

## 2025-07-09 PROCEDURE — 99214 OFFICE O/P EST MOD 30 MIN: CPT | Mod: PBBFAC,PO | Performed by: NURSE PRACTITIONER

## 2025-07-09 RX ORDER — ALLOPURINOL 100 MG/1
100 TABLET ORAL DAILY
Qty: 90 TABLET | Refills: 1 | Status: SHIPPED | OUTPATIENT
Start: 2025-07-09

## 2025-07-10 ENCOUNTER — PATIENT MESSAGE (OUTPATIENT)
Dept: FAMILY MEDICINE | Facility: CLINIC | Age: 75
End: 2025-07-10
Payer: MEDICARE

## 2025-07-10 NOTE — PROGRESS NOTES
Subjective     Patient ID: Riky Salcedo is a 75 y.o. male.    Chief Complaint: Gout    History of Present Illness    CHIEF COMPLAINT:  Mr. Salcedo presents today for gout affecting right great toe.    HISTORY OF PRESENT ILLNESS:  He reports gout symptoms that began approximately 2.5 weeks ago with significant swelling and pain affecting both great toes initially. The left toe has improved, but the right foot remains problematic. He experiences severe intermittent pain at night that disrupts sleep, describing pain as worsening after 1-2 hours and repeatedly waking him. Morning symptoms include significant stiffness with difficulty standing upon initial awakening, improving slightly after 30-60 minutes of movement. X-ray confirmed crystal deposits around toe joints. He acknowledges previous, less severe gout episodes that were previously misattributed to shoe wear.    LABS:  Uric acid level was previously elevated over 10, currently reduced to 7.3 with medication. Completed short course of indomethacin. Was also given allopurinol. Kidney function is normal on recent lab work. Urine test from May was normal with no evidence of blood. Follow-up uric acid level will be checked in November routine lab work.    CURRENT MEDICATIONS:  He was recently given a rx for methylprednisolone (Medrol Dosepak), a 21-pill course prescribed by [facility]. He had reservations about taking this and wanted to discuss with someone.    SOCIAL HISTORY:  He reports daily alcohol consumption of 4-5 beers, occasionally more, which has been consistent for the past 1-2 years since [facility]. He acknowledges drinking as a primary social activity, connecting with friends at local bars. He recently stopped drinking for a few days and expressed willingness to reduce alcohol intake on medical advice.    FAMILY HISTORY:  He reports significant family history of kidney stones affecting multiple family members including his father,  niece, and brother. He acknowledges genetic predisposition to kidney stone development.    SLEEP:  He denies desire for multiple sleep medications due to existing sleep apnea and tinnitus.            Problem List[1]    Review of Systems  Otherwise negative       Objective     Physical Exam  Vitals reviewed.   Constitutional:       General: He is awake. He is not in acute distress.     Appearance: Normal appearance. He is well-developed and well-groomed. He is obese.   HENT:      Head: Normocephalic.      Right Ear: External ear normal.      Left Ear: External ear normal.   Eyes:      General:         Right eye: No discharge.         Left eye: No discharge.   Cardiovascular:      Rate and Rhythm: Normal rate and regular rhythm.      Pulses:           Radial pulses are 2+ on the right side and 2+ on the left side.        Dorsalis pedis pulses are 2+ on the right side and 2+ on the left side.      Heart sounds: Normal heart sounds, S1 normal and S2 normal. No murmur heard.  Pulmonary:      Effort: Pulmonary effort is normal. No respiratory distress.      Breath sounds: Normal breath sounds. No wheezing or rhonchi.   Musculoskeletal:         General: Swelling and tenderness present.   Skin:     General: Skin is warm and dry.      Findings: Erythema present.   Neurological:      Mental Status: He is alert and oriented to person, place, and time.      Coordination: Coordination normal.   Psychiatric:         Attention and Perception: Attention normal.         Mood and Affect: Mood and affect normal.         Speech: Speech normal.         Behavior: Behavior normal. Behavior is cooperative.            Assessment and Plan     1. Acute gout involving toe of right foot, unspecified cause  -     Uric Acid; Future; Expected date: 07/09/2025    2. Primary hypertension  -     CBC Auto Differential; Future; Expected date: 11/01/2025  -     Comprehensive Metabolic Panel; Future; Expected date: 11/01/2025  -     TSH; Future; Expected  date: 11/01/2025    3. Hyperuricemia  -     Uric Acid; Future; Expected date: 07/09/2025  -     Uric Acid; Future; Expected date: 07/09/2025    4. Alcohol abuse with alcohol-induced disorder    5. Elevated blood uric acid level  -     allopurinoL (ZYLOPRIM) 100 MG tablet; Take 1 tablet (100 mg total) by mouth once daily. Start taking daily AFTER completing treatment for gout flare  Dispense: 90 tablet; Refill: 1    6. Prediabetes  -     Hemoglobin A1C; Future; Expected date: 11/01/2025  - Follow ADA diet and do regular exercise    7. Aortic atherosclerosis  -     Lipid Panel; Future; Expected date: 11/01/2025  - Low fat diet and regular exercise encouraged               Assessment & Plan    ACUTE GOUT INVOLVING TOE OF RIGHT FOOT, UNSPECIFIED CAUSE:  - Mr. Pat reports right toe and whole foot are very sore, especially at night, causing sleep disturbances.  - The pain is persistent and worsens after 1-2 hours of sleep.  - Left toe has improved after medication.  - X-ray shows crystals around every knuckle on the great toe.  - Prescribed methylprednisolone (Medrol Dosepak) to be taken as directed on the package, starting with 2 tablets before breakfast, 1 tablet after lunch and dinner on day 1, then following the decreasing dosage schedule for subsequent days.  - Mr. Pat to start allopurinol 100 mg daily after completing the full course of methylprednisolone.  - Provided patient with a list of dietary triggers to avoid and foods that are safe to eat with elevated uric acid levels.  - Educated on relationship between uric acid levels and gout flares.    HYPERURICEMIA:  - Mr. Pat's uric acid level was over 10, which was brought down to 7.3 with medication.  - Instructed to start taking allopurinol daily after completing the Medrol Dosepak.  - Ordered uric acid level to be checked in 2 weeks at [facility] and with routine labs in November.  - Advised to follow provided list of foods to avoid and safe foods  for managing uric acid levels.    ALCOHOL ABUSE:  - Mr. Pat reports consuming 4-5 beers daily for 1-2 years since group home.  - Advised to reduce beer consumption and increase water intake to prevent gout flare-ups.  - Discussed importance of hydration and reducing alcohol intake for managing gout and renal health.    FOLLOW-UP AND REFERRALS:  - Verified patient's kidney function was checked 6 days ago and is normal.  - Urine was checked in May and was perfectly normal with no hematuria.  - Scheduled follow-up uric acid level check for November, with an additional check in 2 weeks.          Follow up if symptoms worsen or fail to improve.        I spent a total of 30 minutes on the day of the visit. This includes face to face time and non-face to face time preparing to see the patient (eg, review of tests), obtaining and/or reviewing separately obtained history, documenting clinical information in the electronic or other health record, independently interpreting results and communicating results to the patient/family/caregiver, or care coordinator.        (Portions of this note may have been dictated using voice recognition software and may contain dictation related errors in spelling/grammar/syntax not found on text review)     This note was generated with the assistance of ambient listening technology. Verbal consent was obtained by the patient and accompanying visitor(s) for the recording of patient appointment to facilitate this note. I attest to having reviewed and edited the generated note for accuracy, though some syntax or spelling errors may persist. Please contact the author of this note for any clarification.              [1]   Patient Active Problem List  Diagnosis    Hypertension    HSV-1 (herpes simplex virus 1) infection    Male hypogonadism    Peyronie's disease    BPH with urinary obstruction    Lipoma of back    Alcohol abuse with alcohol-induced disorder    Hypercholesteremia    Poor diet     Class 1 obesity due to excess calories without serious comorbidity with body mass index (BMI) of 32.0 to 32.9 in adult    Left arm pain    Calcified granuloma of lung    Erectile dysfunction due to arterial insufficiency    Adverse effect of statin    IRLANDA (obstructive sleep apnea)    Aortic atherosclerosis    Obesity (BMI 30.0-34.9)    Senile purpura    Venous insufficiency    ILD (interstitial lung disease)    Statin intolerance    Laceration of right hand without foreign body

## 2025-07-23 ENCOUNTER — LAB VISIT (OUTPATIENT)
Dept: LAB | Facility: HOSPITAL | Age: 75
End: 2025-07-23
Attending: NURSE PRACTITIONER
Payer: MEDICARE

## 2025-07-23 DIAGNOSIS — M10.9 ACUTE GOUT INVOLVING TOE OF RIGHT FOOT, UNSPECIFIED CAUSE: ICD-10-CM

## 2025-07-23 DIAGNOSIS — E79.0 HYPERURICEMIA: ICD-10-CM

## 2025-07-23 LAB — URATE SERPL-MCNC: 6.7 MG/DL (ref 3.4–7)

## 2025-07-23 PROCEDURE — 84550 ASSAY OF BLOOD/URIC ACID: CPT

## 2025-07-23 PROCEDURE — 36415 COLL VENOUS BLD VENIPUNCTURE: CPT

## (undated) DEVICE — DRESSING ABSRBNT ISLAND 3.6X8

## (undated) DEVICE — TAPE SURGICAL MICROFOAM 3IN

## (undated) DEVICE — ADHESIVE MASTISOL VIAL 48/BX

## (undated) DEVICE — ELECTRODE REM PLYHSV RETURN 9

## (undated) DEVICE — TRAY MINOR GEN SURG OMC

## (undated) DEVICE — BANDAGE KERLIX AMD

## (undated) DEVICE — DRAPE LAP T SHT W/ INSTR PAD

## (undated) DEVICE — TIP YANKAUERS BULB NO VENT

## (undated) DEVICE — GLOVE GAMMEX SURG LF PI SZ 7.5

## (undated) DEVICE — ADHESIVE DERMABOND ADVANCED

## (undated) DEVICE — BOVIE SUCTION